# Patient Record
Sex: MALE | Race: WHITE | NOT HISPANIC OR LATINO | Employment: OTHER | URBAN - METROPOLITAN AREA
[De-identification: names, ages, dates, MRNs, and addresses within clinical notes are randomized per-mention and may not be internally consistent; named-entity substitution may affect disease eponyms.]

---

## 2019-10-31 ENCOUNTER — OFFICE VISIT (OUTPATIENT)
Dept: FAMILY MEDICINE | Facility: CLINIC | Age: 64
End: 2019-10-31
Payer: MEDICARE

## 2019-10-31 ENCOUNTER — TELEPHONE (OUTPATIENT)
Dept: ORTHOPEDICS | Facility: CLINIC | Age: 64
End: 2019-10-31

## 2019-10-31 ENCOUNTER — HOSPITAL ENCOUNTER (OUTPATIENT)
Dept: RADIOLOGY | Facility: HOSPITAL | Age: 64
Discharge: HOME | End: 2019-10-31
Attending: INTERNAL MEDICINE
Payer: MEDICARE

## 2019-10-31 VITALS
SYSTOLIC BLOOD PRESSURE: 136 MMHG | TEMPERATURE: 98 F | HEART RATE: 77 BPM | DIASTOLIC BLOOD PRESSURE: 82 MMHG | OXYGEN SATURATION: 98 %

## 2019-10-31 DIAGNOSIS — I10 HYPERTENSION, UNSPECIFIED TYPE: ICD-10-CM

## 2019-10-31 DIAGNOSIS — M25.571 RIGHT ANKLE PAIN, UNSPECIFIED CHRONICITY: ICD-10-CM

## 2019-10-31 DIAGNOSIS — M25.561 RIGHT KNEE PAIN, UNSPECIFIED CHRONICITY: Primary | ICD-10-CM

## 2019-10-31 DIAGNOSIS — Z86.73 HISTORY OF CARDIOEMBOLIC CEREBROVASCULAR ACCIDENT (CVA): ICD-10-CM

## 2019-10-31 DIAGNOSIS — E11.9 TYPE 2 DIABETES MELLITUS WITHOUT COMPLICATION, WITHOUT LONG-TERM CURRENT USE OF INSULIN: ICD-10-CM

## 2019-10-31 DIAGNOSIS — Z11.59 ENCOUNTER FOR HEPATITIS C SCREENING TEST FOR LOW RISK PATIENT: ICD-10-CM

## 2019-10-31 DIAGNOSIS — M25.561 RIGHT KNEE PAIN, UNSPECIFIED CHRONICITY: ICD-10-CM

## 2019-10-31 DIAGNOSIS — B35.1 TOENAIL FUNGUS: ICD-10-CM

## 2019-10-31 PROCEDURE — 99999 PR PBB SHADOW E&M-NEW PATIENT-LVL IV: CPT | Mod: PBBFAC,,, | Performed by: INTERNAL MEDICINE

## 2019-10-31 PROCEDURE — 73560 X-RAY EXAM OF KNEE 1 OR 2: CPT | Mod: 26,RT,, | Performed by: RADIOLOGY

## 2019-10-31 PROCEDURE — 99203 PR OFFICE/OUTPT VISIT, NEW, LEVL III, 30-44 MIN: ICD-10-PCS | Mod: S$PBB,,, | Performed by: INTERNAL MEDICINE

## 2019-10-31 PROCEDURE — 99204 OFFICE O/P NEW MOD 45 MIN: CPT | Mod: PBBFAC,PO,25 | Performed by: INTERNAL MEDICINE

## 2019-10-31 PROCEDURE — 73610 X-RAY EXAM OF ANKLE: CPT | Mod: 26,RT,, | Performed by: RADIOLOGY

## 2019-10-31 PROCEDURE — 99203 OFFICE O/P NEW LOW 30 MIN: CPT | Mod: S$PBB,,, | Performed by: INTERNAL MEDICINE

## 2019-10-31 PROCEDURE — 73610 XR ANKLE COMPLETE 3 VIEW RIGHT: ICD-10-PCS | Mod: 26,RT,, | Performed by: RADIOLOGY

## 2019-10-31 PROCEDURE — 90686 IIV4 VACC NO PRSV 0.5 ML IM: CPT | Mod: PBBFAC,PO

## 2019-10-31 PROCEDURE — 99999 PR PBB SHADOW E&M-NEW PATIENT-LVL IV: ICD-10-PCS | Mod: PBBFAC,,, | Performed by: INTERNAL MEDICINE

## 2019-10-31 PROCEDURE — 73610 X-RAY EXAM OF ANKLE: CPT | Mod: TC,FY,PO,RT

## 2019-10-31 PROCEDURE — 73560 XR KNEE 1 OR 2 VIEW RIGHT: ICD-10-PCS | Mod: 26,RT,, | Performed by: RADIOLOGY

## 2019-10-31 PROCEDURE — 73560 X-RAY EXAM OF KNEE 1 OR 2: CPT | Mod: TC,FY,PO,RT

## 2019-10-31 RX ORDER — PHENYTOIN SODIUM 100 MG/1
400 CAPSULE, EXTENDED RELEASE ORAL ONCE
COMMUNITY
End: 2019-12-12 | Stop reason: SDUPTHER

## 2019-10-31 RX ORDER — GLIMEPIRIDE 4 MG/1
4 TABLET ORAL
COMMUNITY
End: 2019-12-17 | Stop reason: SDUPTHER

## 2019-10-31 RX ORDER — UBIDECARENONE 30 MG
30 CAPSULE ORAL 3 TIMES DAILY
COMMUNITY
End: 2022-04-26 | Stop reason: CLARIF

## 2019-10-31 RX ORDER — TRAZODONE HYDROCHLORIDE 50 MG/1
50 TABLET ORAL NIGHTLY
COMMUNITY
End: 2019-12-17 | Stop reason: SDUPTHER

## 2019-10-31 RX ORDER — ASPIRIN 81 MG/1
81 TABLET ORAL DAILY
Status: ON HOLD | COMMUNITY
End: 2022-05-19 | Stop reason: HOSPADM

## 2019-10-31 RX ORDER — METOPROLOL TARTRATE 50 MG/1
100 TABLET ORAL 2 TIMES DAILY
COMMUNITY
End: 2019-12-16 | Stop reason: SDUPTHER

## 2019-10-31 RX ORDER — METFORMIN HYDROCHLORIDE 500 MG/1
500 TABLET ORAL
COMMUNITY
End: 2019-12-16 | Stop reason: SDUPTHER

## 2019-10-31 RX ORDER — SERTRALINE HYDROCHLORIDE 25 MG/1
25 TABLET, FILM COATED ORAL DAILY
COMMUNITY
End: 2019-12-16 | Stop reason: SDUPTHER

## 2019-10-31 RX ORDER — CHOLECALCIFEROL (VITAMIN D3) 25 MCG
1000 TABLET ORAL DAILY
Status: ON HOLD | COMMUNITY
End: 2022-05-19 | Stop reason: HOSPADM

## 2019-10-31 NOTE — PROGRESS NOTES
Subjective:       Patient ID: Samuel Cast is a 64 y.o. male.    Chief Complaint: Establish Care and Fall (10/20/19,  visit 10/21/19)    Pt here to get established.     DM: on metformin (500 TID) and amaryl.  Unsure what last a1c was.  Fasting sugars in the 90s.  HTN- stable on metoprolol   Hx ischemic stroke : on phenyttoin for seizure prophylaxis. It has left him with right hemiparesis and mild aphasia.  He is on asa and crestor (unsure dosage)    He had a fall 1.5 weeks ago and twisted his knee. He went to urgent care but no xrays were done.  He says his knee is swollen and ankle is swollen.     Review of Systems   Constitutional: Negative for fatigue, fever and unexpected weight change.   HENT: Negative for congestion, postnasal drip, sinus pain and sore throat.    Eyes: Negative for visual disturbance.   Respiratory: Negative for cough, chest tightness, shortness of breath and wheezing.    Cardiovascular: Negative for chest pain, palpitations and leg swelling.   Gastrointestinal: Negative for abdominal pain, blood in stool, constipation, diarrhea, nausea and vomiting.   Endocrine: Negative for cold intolerance and heat intolerance.   Genitourinary: Negative for difficulty urinating, dysuria and frequency.   Musculoskeletal: Negative for back pain, joint swelling and myalgias.   Skin: Negative for rash.   Allergic/Immunologic: Negative for environmental allergies.   Neurological: Negative for dizziness, seizures, numbness and headaches.   Hematological: Does not bruise/bleed easily.   Psychiatric/Behavioral: Negative for agitation and sleep disturbance.       Objective:      Physical Exam   Constitutional: He is oriented to person, place, and time. He appears well-developed and well-nourished.   HENT:   Head: Normocephalic and atraumatic.   Mouth/Throat: Oropharynx is clear and moist.   Eyes: Pupils are equal, round, and reactive to light. Conjunctivae and EOM are normal.   Neck: Normal range of motion. Neck  supple. No thyromegaly present.   Cardiovascular: Normal rate, regular rhythm, normal heart sounds and intact distal pulses. Exam reveals no gallop and no friction rub.   No murmur heard.  Pulmonary/Chest: Effort normal and breath sounds normal. No respiratory distress. He has no wheezes. He has no rales.   Abdominal: Soft. Bowel sounds are normal. He exhibits no distension. There is no tenderness.   Musculoskeletal: Normal range of motion. He exhibits no edema.   Lymphadenopathy:     He has no cervical adenopathy.   Neurological: He is alert and oriented to person, place, and time. No cranial nerve deficit.   Right hemiplegia   Skin: Skin is warm and dry.   Psychiatric: He has a normal mood and affect. His behavior is normal.       Assessment:       1. Right knee pain, unspecified chronicity    2. History of cardioembolic cerebrovascular accident (CVA)    3. Type 2 diabetes mellitus without complication, without long-term current use of insulin    4. Hypertension, unspecified type    5. Right ankle pain, unspecified chronicity    6. Encounter for hepatitis C screening test for low risk patient    7. Toenail fungus        Plan:       Knee pain- xray today, referred to ortho. Aleve BID  HTN- stable on med  Diabetes- decresae metformin to 500 bid, contineu amaryl. Check a1c.   Hx cva- - on asa and statin, bp and dm control    Flu stho today

## 2019-11-02 ENCOUNTER — LAB VISIT (OUTPATIENT)
Dept: LAB | Facility: HOSPITAL | Age: 64
End: 2019-11-02
Attending: INTERNAL MEDICINE
Payer: MEDICARE

## 2019-11-02 DIAGNOSIS — Z11.59 ENCOUNTER FOR HEPATITIS C SCREENING TEST FOR LOW RISK PATIENT: ICD-10-CM

## 2019-11-02 DIAGNOSIS — E11.9 TYPE 2 DIABETES MELLITUS WITHOUT COMPLICATION, WITHOUT LONG-TERM CURRENT USE OF INSULIN: ICD-10-CM

## 2019-11-02 DIAGNOSIS — Z86.73 HISTORY OF CARDIOEMBOLIC CEREBROVASCULAR ACCIDENT (CVA): ICD-10-CM

## 2019-11-02 LAB
ALBUMIN SERPL BCP-MCNC: 3.1 G/DL (ref 3.5–5.2)
ALP SERPL-CCNC: 239 U/L (ref 55–135)
ALT SERPL W/O P-5'-P-CCNC: 53 U/L (ref 10–44)
ANION GAP SERPL CALC-SCNC: 11 MMOL/L (ref 8–16)
AST SERPL-CCNC: 38 U/L (ref 10–40)
BASOPHILS # BLD AUTO: 0.03 K/UL (ref 0–0.2)
BASOPHILS NFR BLD: 0.6 % (ref 0–1.9)
BILIRUB SERPL-MCNC: 0.8 MG/DL (ref 0.1–1)
BUN SERPL-MCNC: 16 MG/DL (ref 8–23)
CALCIUM SERPL-MCNC: 8.9 MG/DL (ref 8.7–10.5)
CHLORIDE SERPL-SCNC: 107 MMOL/L (ref 95–110)
CHOLEST SERPL-MCNC: 177 MG/DL (ref 120–199)
CHOLEST/HDLC SERPL: 4.1 {RATIO} (ref 2–5)
CO2 SERPL-SCNC: 23 MMOL/L (ref 23–29)
CREAT SERPL-MCNC: 0.7 MG/DL (ref 0.5–1.4)
DIFFERENTIAL METHOD: ABNORMAL
EOSINOPHIL # BLD AUTO: 0.2 K/UL (ref 0–0.5)
EOSINOPHIL NFR BLD: 3.4 % (ref 0–8)
ERYTHROCYTE [DISTWIDTH] IN BLOOD BY AUTOMATED COUNT: 15.2 % (ref 11.5–14.5)
EST. GFR  (AFRICAN AMERICAN): >60 ML/MIN/1.73 M^2
EST. GFR  (NON AFRICAN AMERICAN): >60 ML/MIN/1.73 M^2
ESTIMATED AVG GLUCOSE: 123 MG/DL (ref 68–131)
GLUCOSE SERPL-MCNC: 120 MG/DL (ref 70–110)
HBA1C MFR BLD HPLC: 5.9 % (ref 4–5.6)
HCT VFR BLD AUTO: 38 % (ref 40–54)
HDLC SERPL-MCNC: 43 MG/DL (ref 40–75)
HDLC SERPL: 24.3 % (ref 20–50)
HGB BLD-MCNC: 11.8 G/DL (ref 14–18)
IMM GRANULOCYTES # BLD AUTO: 0.03 K/UL (ref 0–0.04)
IMM GRANULOCYTES NFR BLD AUTO: 0.6 % (ref 0–0.5)
LDLC SERPL CALC-MCNC: 103.2 MG/DL (ref 63–159)
LYMPHOCYTES # BLD AUTO: 0.9 K/UL (ref 1–4.8)
LYMPHOCYTES NFR BLD: 17.4 % (ref 18–48)
MCH RBC QN AUTO: 32.3 PG (ref 27–31)
MCHC RBC AUTO-ENTMCNC: 31.1 G/DL (ref 32–36)
MCV RBC AUTO: 104 FL (ref 82–98)
MONOCYTES # BLD AUTO: 0.5 K/UL (ref 0.3–1)
MONOCYTES NFR BLD: 9.6 % (ref 4–15)
NEUTROPHILS # BLD AUTO: 3.7 K/UL (ref 1.8–7.7)
NEUTROPHILS NFR BLD: 68.4 % (ref 38–73)
NONHDLC SERPL-MCNC: 134 MG/DL
NRBC BLD-RTO: 0 /100 WBC
PLATELET # BLD AUTO: 260 K/UL (ref 150–350)
PMV BLD AUTO: 9.9 FL (ref 9.2–12.9)
POTASSIUM SERPL-SCNC: 4.3 MMOL/L (ref 3.5–5.1)
PROT SERPL-MCNC: 6.8 G/DL (ref 6–8.4)
RBC # BLD AUTO: 3.65 M/UL (ref 4.6–6.2)
SODIUM SERPL-SCNC: 141 MMOL/L (ref 136–145)
TRIGL SERPL-MCNC: 154 MG/DL (ref 30–150)
TSH SERPL DL<=0.005 MIU/L-ACNC: 2.7 UIU/ML (ref 0.4–4)
WBC # BLD AUTO: 5.34 K/UL (ref 3.9–12.7)

## 2019-11-02 PROCEDURE — 80061 LIPID PANEL: CPT

## 2019-11-02 PROCEDURE — 36415 COLL VENOUS BLD VENIPUNCTURE: CPT | Mod: PO

## 2019-11-02 PROCEDURE — 80053 COMPREHEN METABOLIC PANEL: CPT

## 2019-11-02 PROCEDURE — 86803 HEPATITIS C AB TEST: CPT

## 2019-11-02 PROCEDURE — 85025 COMPLETE CBC W/AUTO DIFF WBC: CPT

## 2019-11-02 PROCEDURE — 84443 ASSAY THYROID STIM HORMONE: CPT

## 2019-11-02 PROCEDURE — 83036 HEMOGLOBIN GLYCOSYLATED A1C: CPT

## 2019-11-03 ENCOUNTER — PATIENT MESSAGE (OUTPATIENT)
Dept: FAMILY MEDICINE | Facility: CLINIC | Age: 64
End: 2019-11-03

## 2019-11-04 LAB — HCV AB SERPL QL IA: NEGATIVE

## 2019-11-04 RX ORDER — ROSUVASTATIN CALCIUM 20 MG/1
20 TABLET, COATED ORAL DAILY
COMMUNITY
End: 2019-11-04 | Stop reason: SDUPTHER

## 2019-11-04 RX ORDER — ROSUVASTATIN CALCIUM 20 MG/1
20 TABLET, COATED ORAL DAILY
Qty: 90 TABLET | Refills: 3 | Status: SHIPPED | OUTPATIENT
Start: 2019-11-04 | End: 2019-12-16 | Stop reason: SDUPTHER

## 2019-11-08 ENCOUNTER — OFFICE VISIT (OUTPATIENT)
Dept: ORTHOPEDICS | Facility: CLINIC | Age: 64
End: 2019-11-08
Payer: MEDICARE

## 2019-11-08 VITALS — WEIGHT: 283 LBS | BODY MASS INDEX: 34.46 KG/M2 | HEIGHT: 76 IN

## 2019-11-08 DIAGNOSIS — M25.561 ACUTE PAIN OF RIGHT KNEE: ICD-10-CM
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
## 2019-11-08 DIAGNOSIS — M25.571 ACUTE RIGHT ANKLE PAIN: ICD-10-CM

## 2019-11-08 PROCEDURE — 99203 OFFICE O/P NEW LOW 30 MIN: CPT | Mod: S$PBB,,, | Performed by: ORTHOPAEDIC SURGERY

## 2019-11-08 PROCEDURE — 99999 PR PBB SHADOW E&M-EST. PATIENT-LVL III: ICD-10-PCS | Mod: PBBFAC,,, | Performed by: ORTHOPAEDIC SURGERY

## 2019-11-08 PROCEDURE — 99203 PR OFFICE/OUTPT VISIT, NEW, LEVL III, 30-44 MIN: ICD-10-PCS | Mod: S$PBB,,, | Performed by: ORTHOPAEDIC SURGERY

## 2019-11-08 PROCEDURE — 99213 OFFICE O/P EST LOW 20 MIN: CPT | Mod: PBBFAC,PN | Performed by: ORTHOPAEDIC SURGERY

## 2019-11-08 PROCEDURE — 99999 PR PBB SHADOW E&M-EST. PATIENT-LVL III: CPT | Mod: PBBFAC,,, | Performed by: ORTHOPAEDIC SURGERY

## 2019-11-08 RX ORDER — MELOXICAM 15 MG/1
15 TABLET ORAL DAILY
Qty: 30 TABLET | Refills: 1
Start: 2019-11-08 | End: 2020-01-14

## 2019-11-08 NOTE — PROGRESS NOTES
A 64 years old, right knee pain for about three weeks, had a fall, twisted his   knee, witnessed by his daughter, did not hear a pop.  He has deficit in the   right lower extremity from a stroke 12 years ago.  Says he had been feeling   better over the past few years.    PHYSICAL EXAMINATION:  EXTREMITIES:  Shows he has what appears to be some valgus laxity to the knee,   some tenderness along the medial joint line.  Extensor mechanism, I cannot   really assess, he is unable to extend the knee.    ASSESSMENT:  Knee sprain in a 64-year-old with history of stroke affecting the   right lower extremity.    PLAN:  We will recommend an MRI of the knee.  He wants to hold off on that.  We   went through conservative care, observation.  Requested anti-inflammatories.  We   are going to give him Mobic.  We will check him back in a few weeks' time to   see how things are coming along.      PBB/HN  dd: 11/08/2019 13:38:36 (CST)  td: 11/09/2019 06:03:24 (CST)  Doc ID   #[id number]  Job ID #[id number]    CC:     Further History  Aching pain  Worse with activity  Relieved with rest  No other associated symptoms  No other radiation    Further Exam  Alert and oriented  Pleasant  Contralateral limb has appropriate range of motion for age and condition  Contralateral limb has appropriate strength for age and condition  Contralateral limb has appropriate stability  for age and condition  No adenopathy  Pulses are appropriate for current condition  Skin is intact        Chief Complaint    Chief Complaint   Patient presents with    Right Knee - Pain    Right Ankle - Pain       HPI  Samuel Cast is a 64 y.o.  male who presents with       Past Medical History  History reviewed. No pertinent past medical history.    Past Surgical History  History reviewed. No pertinent surgical history.    Medications  Current Outpatient Medications   Medication Sig    aspirin (ECOTRIN) 81 MG EC tablet Take 81 mg by mouth once daily.    co-enzyme Q-10 30 mg  capsule Take 30 mg by mouth 3 (three) times daily.    glimepiride (AMARYL) 4 MG tablet Take 4 mg by mouth before breakfast.    meloxicam (MOBIC) 15 MG tablet Take 1 tablet (15 mg total) by mouth once daily.    metFORMIN (GLUCOPHAGE) 500 MG tablet Take 500 mg by mouth 3 (three) times daily with meals.    metoprolol tartrate (LOPRESSOR) 50 MG tablet Take 100 mg by mouth 2 (two) times daily.    multivitamin Tab Take 1 tablet by mouth once daily.    phenytoin (DILANTIN) 100 MG ER capsule Take 400 mg by mouth once.    rosuvastatin (CRESTOR) 20 MG tablet Take 1 tablet (20 mg total) by mouth once daily.    sertraline (ZOLOFT) 25 MG tablet Take 25 mg by mouth once daily.    traZODone (DESYREL) 50 MG tablet Take 50 mg by mouth every evening.    vitamin D (VITAMIN D3) 1000 units Tab Take 1,000 Units by mouth once daily.     No current facility-administered medications for this visit.        Allergies  Review of patient's allergies indicates:  No Known Allergies    Family History  History reviewed. No pertinent family history.    Social History  Social History     Socioeconomic History    Marital status:      Spouse name: Not on file    Number of children: Not on file    Years of education: Not on file    Highest education level: Not on file   Occupational History    Not on file   Social Needs    Financial resource strain: Not on file    Food insecurity:     Worry: Not on file     Inability: Not on file    Transportation needs:     Medical: Not on file     Non-medical: Not on file   Tobacco Use    Smoking status: Former Smoker   Substance and Sexual Activity    Alcohol use: Not Currently    Drug use: Not Currently    Sexual activity: Not on file   Lifestyle    Physical activity:     Days per week: Not on file     Minutes per session: Not on file    Stress: Not on file   Relationships    Social connections:     Talks on phone: Not on file     Gets together: Not on file     Attends Orthodoxy  service: Not on file     Active member of club or organization: Not on file     Attends meetings of clubs or organizations: Not on file     Relationship status: Not on file   Other Topics Concern    Not on file   Social History Narrative    Not on file               Review of Systems     Constitutional: Negative    HENT: Negative  Eyes: Negative  Respiratory: Negative  Cardiovascular: Negative  Musculoskeletal: HPI  Skin: Negative  Neurological: Negative  Hematological: Negative  Endocrine: Negative                 Physical Exam    There were no vitals filed for this visit.  Body mass index is 34.45 kg/m².  Physical Examination:     General appearance -  well appearing, and in no distress  Mental status - awake  Neck - supple  Chest -  symmetric air entry  Heart - normal rate   Abdomen - soft      Assessment     1. Fall, initial encounter    2. Acute right ankle pain    3. Acute pain of right knee          Plan

## 2019-11-12 ENCOUNTER — PATIENT MESSAGE (OUTPATIENT)
Dept: FAMILY MEDICINE | Facility: CLINIC | Age: 64
End: 2019-11-12

## 2019-11-12 NOTE — TELEPHONE ENCOUNTER
See mychart message. Wife would like to know if pt ok to take Nyquil at night to help with sleep for c/o cough & jerson. Please advise.

## 2019-12-06 ENCOUNTER — OFFICE VISIT (OUTPATIENT)
Dept: ORTHOPEDICS | Facility: CLINIC | Age: 64
End: 2019-12-06
Payer: MEDICARE

## 2019-12-06 VITALS — HEIGHT: 76 IN | BODY MASS INDEX: 34.46 KG/M2 | WEIGHT: 283 LBS

## 2019-12-06 DIAGNOSIS — M25.571 ACUTE RIGHT ANKLE PAIN: Primary | ICD-10-CM

## 2019-12-06 DIAGNOSIS — S83.91XD SPRAIN OF RIGHT KNEE, UNSPECIFIED LIGAMENT, SUBSEQUENT ENCOUNTER: ICD-10-CM
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
## 2019-12-06 DIAGNOSIS — M25.561 ACUTE PAIN OF RIGHT KNEE: ICD-10-CM

## 2019-12-06 PROCEDURE — 97760 ORTHOTIC MGMT&TRAING 1ST ENC: CPT | Mod: GP,S$GLB,, | Performed by: ORTHOPAEDIC SURGERY

## 2019-12-06 PROCEDURE — 97760 PR ORTHOTIC MGMT&TRAINJ INITIAL ENC EA 15 MINS: ICD-10-PCS | Mod: GP,S$GLB,, | Performed by: ORTHOPAEDIC SURGERY

## 2019-12-06 PROCEDURE — 99213 OFFICE O/P EST LOW 20 MIN: CPT | Mod: S$GLB,,, | Performed by: ORTHOPAEDIC SURGERY

## 2019-12-06 PROCEDURE — 99213 PR OFFICE/OUTPT VISIT, EST, LEVL III, 20-29 MIN: ICD-10-PCS | Mod: S$GLB,,, | Performed by: ORTHOPAEDIC SURGERY

## 2019-12-06 PROCEDURE — 99999 PR PBB SHADOW E&M-EST. PATIENT-LVL III: ICD-10-PCS | Mod: PBBFAC,,, | Performed by: ORTHOPAEDIC SURGERY

## 2019-12-06 PROCEDURE — 99999 PR PBB SHADOW E&M-EST. PATIENT-LVL III: CPT | Mod: PBBFAC,,, | Performed by: ORTHOPAEDIC SURGERY

## 2019-12-06 PROCEDURE — 99213 OFFICE O/P EST LOW 20 MIN: CPT | Mod: PBBFAC,PN | Performed by: ORTHOPAEDIC SURGERY

## 2019-12-06 NOTE — PROGRESS NOTES
HISTORY OF PRESENT ILLNESS:  A 64 year old, followup of twisting his knee.    Again, the patient has a history of a stroke with the right lower extremity   deficit, occurred 12 years ago, not really able to move that lower extremity,   comes today to have his right knee reexamined.    He does have some laxity detected in his knee, some tenderness medially. **   wrap around knee brace.  Continue with basic conservative care.  We will see him   back as needed.        OTF/RAUL  dd: 12/06/2019 11:00:40 (CST)  td: 12/07/2019 01:50:13 (CST)  Doc ID   #[id number]  Job ID #[id number]    CC:     We performed a custom orthotic/brace fitting, adjusting and training with the patient. The patient demonstrated understanding and proper care. This was performed for 15 minutes.

## 2019-12-12 ENCOUNTER — PATIENT MESSAGE (OUTPATIENT)
Dept: FAMILY MEDICINE | Facility: CLINIC | Age: 64
End: 2019-12-12

## 2019-12-12 RX ORDER — PHENYTOIN SODIUM 100 MG/1
400 CAPSULE, EXTENDED RELEASE ORAL ONCE
Qty: 4 CAPSULE | Refills: 0 | Status: SHIPPED | OUTPATIENT
Start: 2019-12-12 | End: 2019-12-16 | Stop reason: SDUPTHER

## 2019-12-13 DIAGNOSIS — E11.9 TYPE 2 DIABETES MELLITUS WITHOUT COMPLICATION: ICD-10-CM

## 2019-12-16 ENCOUNTER — PATIENT MESSAGE (OUTPATIENT)
Dept: FAMILY MEDICINE | Facility: CLINIC | Age: 64
End: 2019-12-16

## 2019-12-16 NOTE — PROGRESS NOTES
Refill Routing Note     Medication(s) are appropriate for refill:    Non Participating Provider in Refill Center     Appointments  past 15m or future 3m with PCP    Date Provider   Last Visit   10/31/2019 Nallely Jurado MD   Next Visit   2/4/2020 Nallely Jurado MD       Automatic Epic Protocol Generated Data:    Requested Prescriptions   Pending Prescriptions Disp Refills    phenytoin (DILANTIN) 100 MG ER capsule 4 capsule 0     Sig: Take 4 capsules (400 mg total) by mouth once. for 1 dose       Anticonvulsants Protocol Passed - 12/16/2019  4:40 PM        Passed - Visit with Authorizing provider in past 9 months or upcoming 90 days       rosuvastatin (CRESTOR) 20 MG tablet 90 tablet 3     Sig: Take 1 tablet (20 mg total) by mouth once daily.       Cardiovascular:  Antilipid - Statins Passed - 12/16/2019  4:40 PM        Passed - Patient is at least 18 years old        Passed - Office visit in past 12 months or future 90 days     Recent Outpatient Visits            1 week ago Acute right ankle pain    [facility]    1 month ago Fall, initial encounter    [facility]    1 month ago Right knee pain, unspecified chronicity    [facility] Nallely Jurado MD          Future Appointments              In 1 month Nallely Jurado MD [facility]                Passed - Lipid Panel completed in last 360 days     Lab Results   Component Value Date    CHOL 177 11/02/2019    HDL 43 11/02/2019    LDLCALC 103.2 11/02/2019    TRIG 154 (H) 11/02/2019             Passed - ALT is 94 or below and within 360 days     ALT   Date Value Ref Range Status   11/02/2019 53 (H) 10 - 44 U/L Final              Passed - AST is 54 or below and within 360 days     AST   Date Value Ref Range Status   11/02/2019 38 10 - 40 U/L Final             sertraline (ZOLOFT) 25 MG tablet 30 tablet 0     Sig: Take 1 tablet (25 mg total)  by mouth once daily.       Psychiatry:  Antidepressants - SSRI Passed - 12/16/2019  4:40 PM        Passed - Patient is at least 18 years old        Passed - Last BP in normal range within 360 days     BP Readings from Last 3 Encounters:   10/31/19 136/82              Passed - Office visit in past 6 months or future 90 days     Recent Outpatient Visits            1 week ago Acute right ankle pain    [place] [facility]    1 month ago Fall, initial encounter    [facility]    1 month ago Right knee pain, unspecified chronicity    [facility] Nallely Jurado MD          Future Appointments              In 1 month Nallely Jurado MD [facility]               metFORMIN (GLUCOPHAGE) 500 MG tablet 30 tablet 0     Sig: Take 1 tablet (500 mg total) by mouth 3 (three) times daily with meals.       Endocrinology:  Diabetes - Biguanides Passed - 12/16/2019  4:40 PM        Passed - Patient is at least 18 years old        Passed - Office visit in past 12 months or future 90 days     Recent Outpatient Visits            1 week ago Acute right ankle pain    [place]    1 month ago Fall, initial encounter    [facility]    1 month ago Right knee pain, unspecified chronicity    [facility] Nallely Jurado MD          Future Appointments              In 1 month Nallely Jurado MD [facility]                Passed - Cr is 1.4 or below and within 360 days     Creatinine   Date Value Ref Range Status   11/02/2019 0.7 0.5 - 1.4 mg/dL Final              Passed - HBA1C is 7.9 or below and within 180 days     Hemoglobin A1C   Date Value Ref Range Status   11/02/2019 5.9 (H) 4.0 - 5.6 % Final     Comment:     ADA Screening Guidelines:  5.7-6.4%  Consistent with prediabetes  >or=6.5%  Consistent with diabetes  High levels of fetal  hemoglobin interfere with the HbA1C  assay. Heterozygous hemoglobin variants (HbS, HgC, etc)do  not significantly interfere with this assay.   However, presence of multiple variants may affect accuracy.                Passed - eGFR is 30 or above and within 360 days     eGFR if non    Date Value Ref Range Status   11/02/2019 >60.0 >60 mL/min/1.73 m^2 Final     Comment:     Calculation used to obtain the estimated glomerular filtration  rate (eGFR) is the CKD-EPI equation.                metoprolol tartrate (LOPRESSOR) 50 MG tablet 16 tablet 0     Sig: Take 2 tablets (100 mg total) by mouth 2 (two) times daily.       Cardiovascular:  Beta Blockers Passed - 12/16/2019  4:40 PM        Passed - Patient is at least 18 years old        Passed - Last BP in normal range within 360 days     BP Readings from Last 3 Encounters:   10/31/19 136/82              Passed - Last Heart Rate in normal range within 360 days     Pulse Readings from Last 3 Encounters:   10/31/19 77             Passed - Office visit in past 12 months or future 90 days     Recent Outpatient Visits            1 week ago Acute right ankle pain    [facility]    1 month ago Fall, initial encounter    [facility]    1 month ago Right knee pain, unspecified chronicity    [facility] Nallely Jurado MD          Future Appointments              In 1 month Nallely Jurado MD [facility] [place]

## 2019-12-17 RX ORDER — METFORMIN HYDROCHLORIDE 500 MG/1
500 TABLET ORAL
Qty: 30 TABLET | Refills: 0 | Status: SHIPPED | OUTPATIENT
Start: 2019-12-17 | End: 2019-12-17 | Stop reason: SDUPTHER

## 2019-12-17 RX ORDER — TRAZODONE HYDROCHLORIDE 50 MG/1
50 TABLET ORAL NIGHTLY
Status: CANCELLED | OUTPATIENT
Start: 2019-12-17

## 2019-12-17 RX ORDER — SERTRALINE HYDROCHLORIDE 25 MG/1
25 TABLET, FILM COATED ORAL DAILY
Qty: 30 TABLET | Refills: 0 | Status: SHIPPED | OUTPATIENT
Start: 2019-12-17 | End: 2019-12-23 | Stop reason: SDUPTHER

## 2019-12-17 RX ORDER — METOPROLOL TARTRATE 50 MG/1
100 TABLET ORAL 2 TIMES DAILY
Qty: 16 TABLET | Refills: 0 | Status: SHIPPED | OUTPATIENT
Start: 2019-12-17 | End: 2019-12-17 | Stop reason: SDUPTHER

## 2019-12-17 RX ORDER — SERTRALINE HYDROCHLORIDE 25 MG/1
25 TABLET, FILM COATED ORAL DAILY
Qty: 30 TABLET | Refills: 0 | Status: SHIPPED | OUTPATIENT
Start: 2019-12-17 | End: 2019-12-17 | Stop reason: SDUPTHER

## 2019-12-17 RX ORDER — PHENYTOIN SODIUM 100 MG/1
400 CAPSULE, EXTENDED RELEASE ORAL ONCE
Qty: 4 CAPSULE | Refills: 0 | Status: SHIPPED | OUTPATIENT
Start: 2019-12-17 | End: 2019-12-17 | Stop reason: SDUPTHER

## 2019-12-17 RX ORDER — METOPROLOL TARTRATE 50 MG/1
100 TABLET ORAL 2 TIMES DAILY
Qty: 16 TABLET | Refills: 0 | Status: SHIPPED | OUTPATIENT
Start: 2019-12-17 | End: 2019-12-23 | Stop reason: SDUPTHER

## 2019-12-17 RX ORDER — PHENYTOIN SODIUM 100 MG/1
400 CAPSULE, EXTENDED RELEASE ORAL ONCE
Qty: 4 CAPSULE | Refills: 0 | Status: SHIPPED | OUTPATIENT
Start: 2019-12-17 | End: 2019-12-19 | Stop reason: SDUPTHER

## 2019-12-17 RX ORDER — ROSUVASTATIN CALCIUM 20 MG/1
20 TABLET, COATED ORAL DAILY
Qty: 90 TABLET | Refills: 3 | Status: SHIPPED | OUTPATIENT
Start: 2019-12-17 | End: 2020-10-11

## 2019-12-17 RX ORDER — METFORMIN HYDROCHLORIDE 500 MG/1
500 TABLET ORAL
Qty: 30 TABLET | Refills: 0 | Status: SHIPPED | OUTPATIENT
Start: 2019-12-17 | End: 2019-12-23 | Stop reason: SDUPTHER

## 2019-12-17 RX ORDER — PHENYTOIN SODIUM 100 MG/1
CAPSULE, EXTENDED RELEASE ORAL
Refills: 0 | OUTPATIENT
Start: 2019-12-17

## 2019-12-17 NOTE — TELEPHONE ENCOUNTER
Please see [employer] message. Refills were sent but for incorrect quantities for mail order prescriptions which are usually 90 day supply. Pended medications.

## 2019-12-17 NOTE — PROGRESS NOTES
Refill Routing Note     Medication(s) are appropriate for refill:    Non Participating Provider in Refill Center     Appointments  past 15m or future 3m with PCP    Date Provider   Last Visit   10/31/2019 Nallely Jurado MD   Next Visit   12/17/2019 Nallely Jurado MD       Automatic Epic Protocol Generated Data:    Requested Prescriptions   Pending Prescriptions Disp Refills    glimepiride (AMARYL) 4 MG tablet       Sig: Take 1 tablet (4 mg total) by mouth before breakfast.       Endocrinology:  Diabetes - Sulfonylureas Passed - 12/17/2019  4:22 PM        Passed - Patient is at least 18 years old        Passed - Office visit in past 12 months or future 90 days     Recent Outpatient Visits            1 week ago Acute right ankle pain    [facility]    1 month ago Fall, initial encounter    [facility]    1 month ago Right knee pain, unspecified chronicity    [facility] Nallely Jurado MD          Future Appointments              In 1 month Nallely Jurado MD [facility]                Passed - HBA1C is 7.9 or below and within 180 days     Hemoglobin A1C   Date Value Ref Range Status   11/02/2019 5.9 (H) 4.0 - 5.6 % Final     Comment:     ADA Screening Guidelines:  5.7-6.4%  Consistent with prediabetes  >or=6.5%  Consistent with diabetes  High levels of fetal hemoglobin interfere with the HbA1C  assay. Heterozygous hemoglobin variants (HbS, HgC, etc)do  not significantly interfere with this assay.   However, presence of multiple variants may affect accuracy.                Passed - Cr is 1.4 or below and within 360 days     Creatinine   Date Value Ref Range Status   11/02/2019 0.7 0.5 - 1.4 mg/dL Final              Passed - eGFR is 30 or above and within 360 days     eGFR if non    Date Value Ref Range Status   11/02/2019 >60.0 >60 mL/min/1.73 m^2 Final     Comment:      Calculation used to obtain the estimated glomerular filtration  rate (eGFR) is the CKD-EPI equation.                traZODone (DESYREL) 50 MG tablet       Sig: Take 1 tablet (50 mg total) by mouth every evening.       Psychiatry: Antidepressants - Serotonin Modulator Passed - 12/17/2019  4:22 PM        Passed - Patient is at least 18 years old        Passed - Office visit in past 6 months or future 90 days     Recent Outpatient Visits            1 week ago Acute right ankle pain    [place] [facility]    1 month ago Fall, initial encounter    [place] [facility]    1 month ago Right knee pain, unspecified chronicity    [facility] Nallely Jurado MD          Future Appointments              In 1 month [facility]

## 2019-12-18 RX ORDER — GLIMEPIRIDE 4 MG/1
4 TABLET ORAL
Qty: 30 TABLET | Refills: 11 | Status: SHIPPED | OUTPATIENT
Start: 2019-12-18 | End: 2019-12-23 | Stop reason: SDUPTHER

## 2019-12-18 RX ORDER — TRAZODONE HYDROCHLORIDE 50 MG/1
50 TABLET ORAL NIGHTLY
Qty: 30 TABLET | Refills: 11 | Status: SHIPPED | OUTPATIENT
Start: 2019-12-18 | End: 2020-02-04 | Stop reason: SDUPTHER

## 2019-12-18 NOTE — TELEPHONE ENCOUNTER
----- Message from Lillian Mauricio sent at 12/18/2019  4:26 PM CST -----  Contact: Pt  Type: Needs Medical Advice    Who Called:  PT  Best Call Back Number: [phone]  Additional Information: Requesting a call back regarding pt medication phenytoin (DILANTIN) 100 MG ER capsule pt stated that he takes 4 pill a day and he only had enough for one day and now pt is out   Please Advise ---Thank you

## 2019-12-19 RX ORDER — PHENYTOIN SODIUM 100 MG/1
400 CAPSULE, EXTENDED RELEASE ORAL DAILY
Qty: 120 CAPSULE | Refills: 6 | Status: SHIPPED | OUTPATIENT
Start: 2019-12-19 | End: 2019-12-23 | Stop reason: SDUPTHER

## 2019-12-20 ENCOUNTER — PATIENT MESSAGE (OUTPATIENT)
Dept: FAMILY MEDICINE | Facility: CLINIC | Age: 64
End: 2019-12-20

## 2019-12-23 DIAGNOSIS — E11.9 TYPE 2 DIABETES MELLITUS WITHOUT COMPLICATION, WITHOUT LONG-TERM CURRENT USE OF INSULIN: Primary | ICD-10-CM

## 2019-12-23 RX ORDER — SERTRALINE HYDROCHLORIDE 25 MG/1
25 TABLET, FILM COATED ORAL DAILY
Qty: 30 TABLET | Refills: 0 | Status: SHIPPED | OUTPATIENT
Start: 2019-12-23 | End: 2019-12-30 | Stop reason: SDUPTHER

## 2019-12-23 RX ORDER — GLIMEPIRIDE 4 MG/1
4 TABLET ORAL
Qty: 30 TABLET | Refills: 11 | Status: SHIPPED | OUTPATIENT
Start: 2019-12-23 | End: 2019-12-26 | Stop reason: SDUPTHER

## 2019-12-23 RX ORDER — METOPROLOL TARTRATE 50 MG/1
100 TABLET ORAL 2 TIMES DAILY
Qty: 16 TABLET | Refills: 0 | Status: SHIPPED | OUTPATIENT
Start: 2019-12-23 | End: 2019-12-24

## 2019-12-23 RX ORDER — PHENYTOIN SODIUM 100 MG/1
400 CAPSULE, EXTENDED RELEASE ORAL DAILY
Qty: 120 CAPSULE | Refills: 6 | Status: SHIPPED | OUTPATIENT
Start: 2019-12-23 | End: 2019-12-30 | Stop reason: SDUPTHER

## 2019-12-23 RX ORDER — METFORMIN HYDROCHLORIDE 500 MG/1
500 TABLET ORAL
Qty: 30 TABLET | Refills: 0 | Status: SHIPPED | OUTPATIENT
Start: 2019-12-23 | End: 2019-12-30 | Stop reason: SDUPTHER

## 2019-12-23 NOTE — TELEPHONE ENCOUNTER
Pt requesting glimepiride be sent to mail order(previous rx sent to local pharmacy). Order pended.

## 2019-12-24 RX ORDER — INSULIN PUMP SYRINGE, 3 ML
EACH MISCELLANEOUS
Qty: 1 EACH | Refills: 0 | Status: SHIPPED | OUTPATIENT
Start: 2019-12-24 | End: 2019-12-31 | Stop reason: SDUPTHER

## 2019-12-24 RX ORDER — METOPROLOL TARTRATE 50 MG/1
TABLET ORAL
Qty: 16 TABLET | Refills: 0 | Status: SHIPPED | OUTPATIENT
Start: 2019-12-24 | End: 2020-01-06 | Stop reason: SDUPTHER

## 2019-12-24 RX ORDER — LANCETS
EACH MISCELLANEOUS
Qty: 100 EACH | Refills: 0 | Status: SHIPPED | OUTPATIENT
Start: 2019-12-24 | End: 2019-12-31 | Stop reason: SDUPTHER

## 2019-12-24 RX ORDER — ISOPROPYL ALCOHOL 70 ML/100ML
1 SWAB TOPICAL DAILY
Qty: 90 EACH | Refills: 3 | Status: SHIPPED | OUTPATIENT
Start: 2019-12-24 | End: 2020-10-28

## 2019-12-26 NOTE — PROGRESS NOTES
Refill Authorization Note     is requesting a refill authorization.    Brief assessment and rationale for refill: ROUTE: npp          Medication Therapy Plan: Non-participating provider, route to you           Comments:                       Refill Routing Note     Medication(s) are not appropriate for processing by [facility]:    Non Participating Provider in Refill Center     Appointments  past 12m or future 3m with PCP    Date Provider   Last Visit   10/31/2019 Nallely Jurado MD   Next Visit   2/4/2020 Nallely Jurado MD           Automatic Epic Protocol Generated Data:    Requested Prescriptions   Pending Prescriptions Disp Refills    glimepiride (AMARYL) 4 MG tablet 90 tablet 2     Sig: Take 1 tablet (4 mg total) by mouth before breakfast.       Endocrinology:  Diabetes - Sulfonylureas Passed - 12/26/2019  2:42 PM        Passed - Patient is at least 18 years old        Passed - Office visit in past 12 months or future 90 days     Recent Outpatient Visits            2 weeks ago Acute right ankle pain    [facility]    1 month ago Fall, initial encounter    [facility]    1 month ago Right knee pain, unspecified chronicity    [facility] Nallely Jurado MD          Future Appointments              In 2 weeks [facility]ita Martínez D[facility]    In 1 month Nallely Jurado MD [facility]                Passed - HBA1C is 7.9 or below and within 180 days     Hemoglobin A1C   Date Value Ref Range Status   11/02/2019 5.9 (H) 4.0 - 5.6 % Final     Comment:     ADA Screening Guidelines:  5.7-6.4%  Consistent with prediabetes  >or=6.5%  Consistent with diabetes  High levels of fetal hemoglobin interfere with the HbA1C  assay. Heterozygous hemoglobin variants (HbS, HgC, etc)do  not significantly interfere with this assay.   However, presence of multiple variants may  affect accuracy.                Passed - Cr is 1.4 or below and within 360 days     Creatinine   Date Value Ref Range Status   11/02/2019 0.7 0.5 - 1.4 mg/dL Final              Passed - eGFR is 30 or above and within 360 days     eGFR if non    Date Value Ref Range Status   11/02/2019 >60.0 >60 mL/min/1.73 m^2 Final     Comment:     Calculation used to obtain the estimated glomerular filtration  rate (eGFR) is the CKD-EPI equation.        eGFR if    Date Value Ref Range Status   11/02/2019 >60.0 >60 mL/min/1.73 m^2 Final

## 2019-12-30 RX ORDER — GLIMEPIRIDE 4 MG/1
4 TABLET ORAL
Qty: 90 TABLET | Refills: 2 | Status: SHIPPED | OUTPATIENT
Start: 2019-12-30 | End: 2020-02-04 | Stop reason: SDUPTHER

## 2019-12-31 DIAGNOSIS — E11.9 TYPE 2 DIABETES MELLITUS WITHOUT COMPLICATION, WITHOUT LONG-TERM CURRENT USE OF INSULIN: ICD-10-CM

## 2019-12-31 RX ORDER — PHENYTOIN SODIUM 100 MG/1
400 CAPSULE, EXTENDED RELEASE ORAL DAILY
Qty: 120 CAPSULE | Refills: 6 | Status: SHIPPED | OUTPATIENT
Start: 2019-12-31 | End: 2020-03-24 | Stop reason: SDUPTHER

## 2019-12-31 RX ORDER — METFORMIN HYDROCHLORIDE 500 MG/1
500 TABLET ORAL
Qty: 30 TABLET | Refills: 0 | Status: SHIPPED | OUTPATIENT
Start: 2019-12-31 | End: 2020-10-08 | Stop reason: SDUPTHER

## 2019-12-31 RX ORDER — SERTRALINE HYDROCHLORIDE 25 MG/1
25 TABLET, FILM COATED ORAL DAILY
Qty: 30 TABLET | Refills: 0 | Status: SHIPPED | OUTPATIENT
Start: 2019-12-31 | End: 2020-01-30

## 2019-12-31 NOTE — PROGRESS NOTES
Refill Routing Note     Medication(s) are not appropriate for processing by [facility]:    Non Participating Provider in Refill Center     Appointments  past 12m or future 3m with PCP    Date Provider   Last Visit   10/31/2019 Nallely Jurado MD   Next Visit   2/4/2020 Nallely Jurado MD           Automatic Epic Protocol Generated Data:    Requested Prescriptions   Pending Prescriptions Disp Refills    phenytoin (DILANTIN) 100 MG ER capsule 120 capsule 6     Sig: Take 4 capsules (400 mg total) by mouth once daily.       Anticonvulsants Protocol Passed - 12/30/2019  3:08 PM        Passed - Visit with Authorizing provider in past 9 months or upcoming 90 days       sertraline (ZOLOFT) 25 MG tablet 30 tablet 0     Sig: Take 1 tablet (25 mg total) by mouth once daily.       Psychiatry:  Antidepressants - SSRI Passed - 12/30/2019  3:08 PM        Passed - Patient is at least 18 years old        Passed - Last BP in normal range within 360 days     BP Readings from Last 3 Encounters:   10/31/19 136/82              Passed - Office visit in past 6 months or future 90 days     Recent Outpatient Visits            3 weeks ago Acute right ankle pain    [place] [facility] Keating MD    1 month ago Fall, initial encounter    [place] [facility] Keating MD    2 months ago Right knee pain, unspecified chronicity    [facility] Nallely Jurado MD          Future Appointments              In 2 weeks [facility] Martínez, [facility]    In 1 month Nallely Jurado MD [facility]               metFORMIN (GLUCOPHAGE) 500 MG tablet 30 tablet 0     Sig: Take 1 tablet (500 mg total) by mouth 3 (three) times daily with meals.       Endocrinology:  Diabetes - Biguanides Passed - 12/30/2019  3:08 PM        Passed - Patient is at least 18 years old        Passed - Office visit in past 12 months or future 90 days      Recent Outpatient Visits            3 weeks ago Acute right ankle pain    [facility] Ac Keating MD    1 month ago Fall, initial encounter    [facility] Ac Keating MD    2 months ago Right knee pain, unspecified chronicity    [facility] Nallely Jurado MD          Future Appointments              In 2 weeks Nikita Martínez, RIYA [facility]    In 1 month Nallely [facility]ado [facility]                Passed - Cr is 1.4 or below and within 360 days     Creatinine   Date Value Ref Range Status   11/02/2019 0.7 0.5 - 1.4 mg/dL Final              Passed - HBA1C is 7.9 or below and within 180 days     Hemoglobin A1C   Date Value Ref Range Status   11/02/2019 5.9 (H) 4.0 - 5.6 % Final     Comment:     ADA Screening Guidelines:  5.7-6.4%  Consistent with prediabetes  >or=6.5%  Consistent with diabetes  High levels of fetal hemoglobin interfere with the HbA1C  assay. Heterozygous hemoglobin variants (HbS, HgC, etc)do  not significantly interfere with this assay.   However, presence of multiple variants may affect accuracy.                Passed - eGFR is 30 or above and within 360 days     eGFR if non    Date Value Ref Range Status   11/02/2019 >60.0 >60 mL/min/1.73 m^2 Final     Comment:     Calculation used to obtain the estimated glomerular filtration  rate (eGFR) is the CKD-EPI equation.        eGFR if    Date Value Ref Range Status   11/02/2019 >60.0 >60 mL/min/1.73 m^2 Final

## 2019-12-31 NOTE — PROGRESS NOTES
Refill Routing Note     Medication(s) are not appropriate for processing by [facility]:    Non Participating Provider in Refill Center     Appointments  past 12m or future 3m with PCP    Date Provider   Last Visit   10/31/2019 Nallely Jurado MD   Next Visit   2/4/2020 Nallely Jurado MD           Automatic Epic Protocol Generated Data:    Requested Prescriptions   Pending Prescriptions Disp Refills    blood-glucose meter kit 1 each 0     Sig: To check BG  daily, to use with insurance preferred meter       Endocrinology: Diabetes - Supplies Passed - 12/31/2019  4:09 PM        Passed - Patient is at least 18 years old        Passed - Office visit in past 12 months or future 90 days.     Recent Outpatient Visits            3 weeks ago Acute right ankle pain    [facility] Ac Keating MD    1 month ago Fall, initial encounter    [facility] Ac Keating MD    2 months ago Right knee pain, unspecified chronicity    [facility] Nallely Jurado MD          Future Appointments              In 2 weeks Nikita Martínez DPM [facility]    In 1 month Nallely Jurado MD [facility]                Passed - HBA1C is 7.9 or below and within 5 years.     Hemoglobin A1C   Date Value Ref Range Status   11/02/2019 5.9 (H) 4.0 - 5.6 % Final     Comment:     ADA Screening Guidelines:  5.7-6.4%  Consistent with prediabetes  >or=6.5%  Consistent with diabetes  High levels of fetal hemoglobin interfere with the HbA1C  assay. Heterozygous hemoglobin variants (HbS, HgC, etc)do  not significantly interfere with this assay.   However, presence of multiple variants may affect accuracy.               blood sugar diagnostic Strp 100 strip 0     Sig: To check BG daily, to use with insurance preferred meter       Endocrinology: Diabetes - Supplies Passed - 12/31/2019  4:09 PM        Passed - Patient is at least  18 years old        Passed - Office visit in past 12 months or future 90 days.     Recent Outpatient Visits            3 weeks ago Acute right ankle pain    [facility]    1 month ago Fall, initial encounter    [facility] Ke[facility] MD    2 months ago Right knee pain, unspecified chronicity    [facility] Nallely Jurado MD          Future Appointments              In 2 weeks RIYA Billy  [facility]    In 1 month Nallely Jurado MD [facility]                Passed - HBA1C is 7.9 or below and within 5 years.     Hemoglobin A1C   Date Value Ref Range Status   11/02/2019 5.9 (H) 4.0 - 5.6 % Final     Comment:     ADA Screening Guidelines:  5.7-6.4%  Consistent with prediabetes  >or=6.5%  Consistent with diabetes  High levels of fetal hemoglobin interfere with the HbA1C  assay. Heterozygous hemoglobin variants (HbS, HgC, etc)do  not significantly interfere with this assay.   However, presence of multiple variants may affect accuracy.               lancets Misc 100 each 0     Sig: To check BG daily, to use with insurance preferred meter       Endocrinology: Diabetes - Supplies Passed - 12/31/2019  4:09 PM        Passed - Patient is at least 18 years old        Passed - Office visit in past 12 months or future 90 days.     Recent Outpatient Visits            3 weeks ago Acute right ankle pain    [facility] Keating MD    1 month ago Fall, initial encounter    [facility] Keating MD    2 months ago Right knee pain, unspecified chronicity    [facility] Nallely Jurado MD          Future Appointments              In 2 weeks RIYA Billy  [facility]    In 1 month Nallely Jurado MD [facility]                Passed - HBA1C is 7.9 or below and within 5 years.     Hemoglobin A1C    Date Value Ref Range Status   11/02/2019 5.9 (H) 4.0 - 5.6 % Final     Comment:     ADA Screening Guidelines:  5.7-6.4%  Consistent with prediabetes  >or=6.5%  Consistent with diabetes  High levels of fetal hemoglobin interfere with the HbA1C  assay. Heterozygous hemoglobin variants (HbS, HgC, etc)do  not significantly interfere with this assay.   However, presence of multiple variants may affect accuracy.

## 2020-01-02 RX ORDER — INSULIN PUMP SYRINGE, 3 ML
EACH MISCELLANEOUS
Qty: 1 EACH | Refills: 0 | Status: ON HOLD | OUTPATIENT
Start: 2020-01-02 | End: 2022-05-19 | Stop reason: HOSPADM

## 2020-01-02 RX ORDER — LANCETS
EACH MISCELLANEOUS
Qty: 100 EACH | Refills: 0 | Status: ON HOLD | OUTPATIENT
Start: 2020-01-02 | End: 2022-05-19 | Stop reason: HOSPADM

## 2020-01-06 NOTE — TELEPHONE ENCOUNTER
----- Message from Leah Chapman sent at 1/6/2020 12:49 PM CST -----  Type: Needs Medical Advice    Who Called:  Connie   Pharmacy name and phone #: [facility]  [phone]  Best Call Back Number:   [phone]  Additional Information: requesting refill metoprolol tartrate (LOPRESSOR) 50 MG tablet    Thank you,

## 2020-01-07 RX ORDER — METOPROLOL TARTRATE 50 MG/1
100 TABLET ORAL 2 TIMES DAILY
Qty: 360 TABLET | Refills: 3 | Status: SHIPPED | OUTPATIENT
Start: 2020-01-07 | End: 2020-10-28

## 2020-01-14 ENCOUNTER — LAB VISIT (OUTPATIENT)
Dept: LAB | Facility: HOSPITAL | Age: 65
End: 2020-01-14
Attending: INTERNAL MEDICINE
Payer: MEDICARE

## 2020-01-14 ENCOUNTER — OFFICE VISIT (OUTPATIENT)
Dept: PODIATRY | Facility: CLINIC | Age: 65
End: 2020-01-14
Payer: MEDICARE

## 2020-01-14 VITALS
SYSTOLIC BLOOD PRESSURE: 141 MMHG | HEART RATE: 64 BPM | WEIGHT: 283.06 LBS | DIASTOLIC BLOOD PRESSURE: 80 MMHG | HEIGHT: 76 IN | BODY MASS INDEX: 34.47 KG/M2

## 2020-01-14 DIAGNOSIS — L60.1 ONYCHOLYSIS OF TOENAIL: ICD-10-CM

## 2020-01-14 DIAGNOSIS — E11.9 TYPE 2 DIABETES MELLITUS WITHOUT COMPLICATION: ICD-10-CM

## 2020-01-14 DIAGNOSIS — E11.9 TYPE 2 DIABETES MELLITUS WITHOUT COMPLICATION, WITHOUT LONG-TERM CURRENT USE OF INSULIN: Primary | ICD-10-CM

## 2020-01-14 LAB
ALBUMIN/CREAT UR: 7.9 UG/MG (ref 0–30)
CREAT UR-MCNC: 254 MG/DL (ref 23–375)
MICROALBUMIN UR DL<=1MG/L-MCNC: 20 UG/ML

## 2020-01-14 PROCEDURE — 99499 UNLISTED E&M SERVICE: CPT | Mod: HCNC,S$GLB,, | Performed by: PODIATRIST

## 2020-01-14 PROCEDURE — 99203 OFFICE O/P NEW LOW 30 MIN: CPT | Mod: HCNC,S$GLB,, | Performed by: PODIATRIST

## 2020-01-14 PROCEDURE — 99203 PR OFFICE/OUTPT VISIT, NEW, LEVL III, 30-44 MIN: ICD-10-PCS | Mod: HCNC,S$GLB,, | Performed by: PODIATRIST

## 2020-01-14 PROCEDURE — 3077F PR MOST RECENT SYSTOLIC BLOOD PRESSURE >= 140 MM HG: ICD-10-PCS | Mod: HCNC,CPTII,S$GLB, | Performed by: PODIATRIST

## 2020-01-14 PROCEDURE — 3044F HG A1C LEVEL LT 7.0%: CPT | Mod: HCNC,CPTII,S$GLB, | Performed by: PODIATRIST

## 2020-01-14 PROCEDURE — 99499 RISK ADDL DX/OHS AUDIT: ICD-10-PCS | Mod: HCNC,S$GLB,, | Performed by: PODIATRIST

## 2020-01-14 PROCEDURE — 3079F PR MOST RECENT DIASTOLIC BLOOD PRESSURE 80-89 MM HG: ICD-10-PCS | Mod: HCNC,CPTII,S$GLB, | Performed by: PODIATRIST

## 2020-01-14 PROCEDURE — 99999 PR PBB SHADOW E&M-EST. PATIENT-LVL III: CPT | Mod: PBBFAC,HCNC,, | Performed by: PODIATRIST

## 2020-01-14 PROCEDURE — 82043 UR ALBUMIN QUANTITATIVE: CPT | Mod: HCNC

## 2020-01-14 PROCEDURE — 3077F SYST BP >= 140 MM HG: CPT | Mod: HCNC,CPTII,S$GLB, | Performed by: PODIATRIST

## 2020-01-14 PROCEDURE — 3079F DIAST BP 80-89 MM HG: CPT | Mod: HCNC,CPTII,S$GLB, | Performed by: PODIATRIST

## 2020-01-14 PROCEDURE — 3008F BODY MASS INDEX DOCD: CPT | Mod: HCNC,CPTII,S$GLB, | Performed by: PODIATRIST

## 2020-01-14 PROCEDURE — 3044F PR MOST RECENT HEMOGLOBIN A1C LEVEL <7.0%: ICD-10-PCS | Mod: HCNC,CPTII,S$GLB, | Performed by: PODIATRIST

## 2020-01-14 PROCEDURE — 3008F PR BODY MASS INDEX (BMI) DOCUMENTED: ICD-10-PCS | Mod: HCNC,CPTII,S$GLB, | Performed by: PODIATRIST

## 2020-01-14 PROCEDURE — 99999 PR PBB SHADOW E&M-EST. PATIENT-LVL III: ICD-10-PCS | Mod: PBBFAC,HCNC,, | Performed by: PODIATRIST

## 2020-01-14 NOTE — LETTER
January 26, 2020      Nallely Jurado MD  [address] [facility] [address] [place] [address]           [facility]  [address] [place] [address]  Phone: [phone]          Patient: Samuel Cast   MR Number: [id number]   YOB: 1955   Date of Visit: 1/14/2020       Dear Dr. Nallely Jurado:    Thank you for referring Samuel Cast to me for evaluation. Attached you will find relevant portions of my assessment and plan of care.    If you have questions, please do not hesitate to call me. I look forward to following Samuel Cast along with you.    Sincerely,    Nikita Martínez, DPRONNI    Enclosure  CC:  No Recipients    If you would like to receive this communication electronically, please contact [email or web address] or [phone] to request more information on [employer] Link access.    For providers and/or their staff who would like to refer a patient to [facility], please contact us through our one-stop-shop provider referral line, [facility], at [phone].    If you feel you have received this communication in error or would no longer like to receive these types of communications, please e-mail [email or web address]

## 2020-01-17 ENCOUNTER — PATIENT MESSAGE (OUTPATIENT)
Dept: ORTHOPEDICS | Facility: CLINIC | Age: 65
End: 2020-01-17

## 2020-01-17 DIAGNOSIS — E11.9 TYPE 2 DIABETES MELLITUS WITHOUT COMPLICATION, UNSPECIFIED WHETHER LONG TERM INSULIN USE: ICD-10-CM

## 2020-01-20 ENCOUNTER — PATIENT MESSAGE (OUTPATIENT)
Dept: ORTHOPEDICS | Facility: CLINIC | Age: 65
End: 2020-01-20

## 2020-01-20 ENCOUNTER — TELEPHONE (OUTPATIENT)
Dept: ORTHOPEDICS | Facility: CLINIC | Age: 65
End: 2020-01-20

## 2020-01-24 ENCOUNTER — OFFICE VISIT (OUTPATIENT)
Dept: ORTHOPEDICS | Facility: CLINIC | Age: 65
End: 2020-01-24
Payer: MEDICARE

## 2020-01-24 VITALS — HEIGHT: 76 IN | WEIGHT: 283.06 LBS | BODY MASS INDEX: 34.47 KG/M2

## 2020-01-24 DIAGNOSIS — M25.571 ACUTE RIGHT ANKLE PAIN: Primary | ICD-10-CM

## 2020-01-24 DIAGNOSIS — R26.9 GAIT ABNORMALITY: ICD-10-CM

## 2020-01-24 DIAGNOSIS — M25.561 ACUTE PAIN OF RIGHT KNEE: ICD-10-CM

## 2020-01-24 PROCEDURE — 99999 PR PBB SHADOW E&M-EST. PATIENT-LVL III: ICD-10-PCS | Mod: PBBFAC,HCNC,, | Performed by: ORTHOPAEDIC SURGERY

## 2020-01-24 PROCEDURE — 99999 PR PBB SHADOW E&M-EST. PATIENT-LVL III: CPT | Mod: PBBFAC,HCNC,, | Performed by: ORTHOPAEDIC SURGERY

## 2020-01-24 PROCEDURE — 99213 PR OFFICE/OUTPT VISIT, EST, LEVL III, 20-29 MIN: ICD-10-PCS | Mod: HCNC,S$GLB,, | Performed by: ORTHOPAEDIC SURGERY

## 2020-01-24 PROCEDURE — 3008F PR BODY MASS INDEX (BMI) DOCUMENTED: ICD-10-PCS | Mod: HCNC,CPTII,S$GLB, | Performed by: ORTHOPAEDIC SURGERY

## 2020-01-24 PROCEDURE — 99213 OFFICE O/P EST LOW 20 MIN: CPT | Mod: HCNC,S$GLB,, | Performed by: ORTHOPAEDIC SURGERY

## 2020-01-24 PROCEDURE — 3008F BODY MASS INDEX DOCD: CPT | Mod: HCNC,CPTII,S$GLB, | Performed by: ORTHOPAEDIC SURGERY

## 2020-01-24 NOTE — PROGRESS NOTES
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  States he is feeling much better but he and his wife for interested in getting him back to walking.  He reports that prior to this incident where he injured his right lower extremity he was able to walk with an assistive device    Exam shows inability to extend the right knee actively or flex the right hip actively    Plan:  We will get him set up with home health in hopes of trying to get him back to ambulating with assistive devices again.  We will see him back in our orthopedic surgery clinic on as-needed basis

## 2020-01-27 NOTE — PROGRESS NOTES
Subjective:      Patient ID: Samuel Cast is a 64 y.o. male.    Chief Complaint: Nail Problem (Right great toenail came off, PCP--10/31/2019) and Diabetes Mellitus (A1c-5.9-11/02/2019)    Samuel is a 64 y.o. male who presents to the clinic upon referral from Dr. Jurado  for evaluation and treatment of diabetic feet. Samuel has no past medical history on file. Patient relates no major problem with feet. Only complaints today consist of concern over right great toenail that has fallen off, underlying skin he has not noted redness or drainage..    PCP: Nallely Jurado MD    Date Last Seen by PCP: 10/31/20    Current shoe gear: Casual shoes    Hemoglobin A1C   Date Value Ref Range Status   11/02/2019 5.9 (H) 4.0 - 5.6 % Final     Comment:     ADA Screening Guidelines:  5.7-6.4%  Consistent with prediabetes  >or=6.5%  Consistent with diabetes  High levels of fetal hemoglobin interfere with the HbA1C  assay. Heterozygous hemoglobin variants (HbS, HgC, etc)do  not significantly interfere with this assay.   However, presence of multiple variants may affect accuracy.             Review of Systems   Constitution: Negative for chills and fever.   Cardiovascular: Positive for leg swelling. Negative for claudication.   Respiratory: Negative for shortness of breath.    Skin: Positive for nail changes. Negative for itching and rash.   Musculoskeletal: Negative for muscle cramps, muscle weakness and myalgias.   Gastrointestinal: Negative for nausea and vomiting.   Neurological: Negative for focal weakness, loss of balance, numbness and paresthesias.           Objective:      Physical Exam   Constitutional: He is oriented to person, place, and time. He appears well-developed and well-nourished. No distress.   Cardiovascular:   Pulses:       Dorsalis pedis pulses are 2+ on the right side, and 2+ on the left side.        Posterior tibial pulses are 2+ on the right side, and 2+ on the left side.   < 3 sec capillary refill time  to toes 1-5 bilateral. Toes and feet are warm to touch proximally with normal distal cooling b/l. There is no hair growth on the feet and toes b/l. There is edema b/l. No spider veins or varicosities present b/l.      Musculoskeletal:   Equinus noted b/l ankles with < 10 deg DF noted. MMT 5/5 in DF/PF/Inv/Ev resistance with no reproduction of pain in any direction. Passive range of motion of ankle and pedal joints is painless b/l.     Neurological: He is alert and oriented to person, place, and time. He has normal strength. He displays no atrophy and no tremor. No sensory deficit. He exhibits normal muscle tone.   Negative tinel sign bilateral.   Skin: Skin is warm, dry and intact. No abrasion, no bruising, no burn, no ecchymosis, no laceration, no lesion, no petechiae and no rash noted. He is not diaphoretic. No cyanosis or erythema. No pallor. Nails show no clubbing.   Skin is thin and atrophic bilateral    Right great toenail gone with mostly healed underlying nail bed     Psychiatric: He has a normal mood and affect. His behavior is normal.             Assessment:       Encounter Diagnoses   Name Primary?    Type 2 diabetes mellitus without complication, without long-term current use of insulin Yes    Onycholysis of toenail          Plan:       Samuel was seen today for nail problem and diabetes mellitus.    Diagnoses and all orders for this visit:    Type 2 diabetes mellitus without complication, without long-term current use of insulin    Onycholysis of toenail      I counseled the patient on his conditions, their implications and medical management.    Nail is gone and skin is well healed    Shoe inspection. Diabetic Foot Education. Patient reminded of the importance of good nutrition and blood sugar control to help prevent podiatric complications of diabetes. Patient instructed on proper foot hygeine. We discussed wearing proper shoe gear, daily foot inspections, never walking without protective shoe gear,  never putting sharp instruments to feet    Return annual visit or sooner PRN eoutine care    Niikta Martínez, ZULYM

## 2020-01-30 RX ORDER — SERTRALINE HYDROCHLORIDE 25 MG/1
25 TABLET, FILM COATED ORAL DAILY
Qty: 30 TABLET | Refills: 0 | Status: SHIPPED | OUTPATIENT
Start: 2020-01-30 | End: 2020-02-04

## 2020-02-04 ENCOUNTER — OFFICE VISIT (OUTPATIENT)
Dept: FAMILY MEDICINE | Facility: CLINIC | Age: 65
End: 2020-02-04
Payer: MEDICARE

## 2020-02-04 VITALS
TEMPERATURE: 98 F | BODY MASS INDEX: 32.32 KG/M2 | HEIGHT: 76 IN | OXYGEN SATURATION: 98 % | DIASTOLIC BLOOD PRESSURE: 78 MMHG | SYSTOLIC BLOOD PRESSURE: 128 MMHG | HEART RATE: 64 BPM | WEIGHT: 265.44 LBS

## 2020-02-04 DIAGNOSIS — I10 HYPERTENSION, UNSPECIFIED TYPE: ICD-10-CM

## 2020-02-04 DIAGNOSIS — E11.9 TYPE 2 DIABETES MELLITUS WITHOUT COMPLICATION, WITHOUT LONG-TERM CURRENT USE OF INSULIN: Primary | ICD-10-CM

## 2020-02-04 DIAGNOSIS — Z86.73 HISTORY OF CARDIOEMBOLIC CEREBROVASCULAR ACCIDENT (CVA): ICD-10-CM

## 2020-02-04 PROCEDURE — 99999 PR PBB SHADOW E&M-EST. PATIENT-LVL IV: CPT | Mod: PBBFAC,HCNC,, | Performed by: INTERNAL MEDICINE

## 2020-02-04 PROCEDURE — 3078F PR MOST RECENT DIASTOLIC BLOOD PRESSURE < 80 MM HG: ICD-10-PCS | Mod: HCNC,CPTII,S$GLB, | Performed by: INTERNAL MEDICINE

## 2020-02-04 PROCEDURE — 90732 PPSV23 VACC 2 YRS+ SUBQ/IM: CPT | Mod: HCNC,S$GLB,, | Performed by: INTERNAL MEDICINE

## 2020-02-04 PROCEDURE — 3008F BODY MASS INDEX DOCD: CPT | Mod: HCNC,CPTII,S$GLB, | Performed by: INTERNAL MEDICINE

## 2020-02-04 PROCEDURE — 3078F DIAST BP <80 MM HG: CPT | Mod: HCNC,CPTII,S$GLB, | Performed by: INTERNAL MEDICINE

## 2020-02-04 PROCEDURE — 99999 PR PBB SHADOW E&M-EST. PATIENT-LVL IV: ICD-10-PCS | Mod: PBBFAC,HCNC,, | Performed by: INTERNAL MEDICINE

## 2020-02-04 PROCEDURE — 3074F SYST BP LT 130 MM HG: CPT | Mod: HCNC,CPTII,S$GLB, | Performed by: INTERNAL MEDICINE

## 2020-02-04 PROCEDURE — 3044F HG A1C LEVEL LT 7.0%: CPT | Mod: HCNC,CPTII,S$GLB, | Performed by: INTERNAL MEDICINE

## 2020-02-04 PROCEDURE — 3044F PR MOST RECENT HEMOGLOBIN A1C LEVEL <7.0%: ICD-10-PCS | Mod: HCNC,CPTII,S$GLB, | Performed by: INTERNAL MEDICINE

## 2020-02-04 PROCEDURE — 3074F PR MOST RECENT SYSTOLIC BLOOD PRESSURE < 130 MM HG: ICD-10-PCS | Mod: HCNC,CPTII,S$GLB, | Performed by: INTERNAL MEDICINE

## 2020-02-04 PROCEDURE — 99214 PR OFFICE/OUTPT VISIT, EST, LEVL IV, 30-39 MIN: ICD-10-PCS | Mod: HCNC,25,S$GLB, | Performed by: INTERNAL MEDICINE

## 2020-02-04 PROCEDURE — 99214 OFFICE O/P EST MOD 30 MIN: CPT | Mod: HCNC,25,S$GLB, | Performed by: INTERNAL MEDICINE

## 2020-02-04 PROCEDURE — 90732 PNEUMOCOCCAL POLYSACCHARIDE VACCINE 23-VALENT =>2YO SQ IM: ICD-10-PCS | Mod: HCNC,S$GLB,, | Performed by: INTERNAL MEDICINE

## 2020-02-04 PROCEDURE — 3008F PR BODY MASS INDEX (BMI) DOCUMENTED: ICD-10-PCS | Mod: HCNC,CPTII,S$GLB, | Performed by: INTERNAL MEDICINE

## 2020-02-04 PROCEDURE — G0009 ADMIN PNEUMOCOCCAL VACCINE: HCPCS | Mod: HCNC,S$GLB,, | Performed by: INTERNAL MEDICINE

## 2020-02-04 PROCEDURE — G0009 PNEUMOCOCCAL POLYSACCHARIDE VACCINE 23-VALENT =>2YO SQ IM: ICD-10-PCS | Mod: HCNC,S$GLB,, | Performed by: INTERNAL MEDICINE

## 2020-02-04 RX ORDER — SERTRALINE HYDROCHLORIDE 50 MG/1
50 TABLET, FILM COATED ORAL DAILY
Qty: 90 TABLET | Refills: 3 | Status: SHIPPED | OUTPATIENT
Start: 2020-02-04 | End: 2020-11-24

## 2020-02-04 RX ORDER — GLIMEPIRIDE 4 MG/1
4 TABLET ORAL
Qty: 90 TABLET | Refills: 2 | Status: SHIPPED | OUTPATIENT
Start: 2020-02-04 | End: 2020-12-15

## 2020-02-04 RX ORDER — TRAZODONE HYDROCHLORIDE 50 MG/1
50 TABLET ORAL NIGHTLY
Qty: 90 TABLET | Refills: 3 | Status: SHIPPED | OUTPATIENT
Start: 2020-02-04 | End: 2020-11-24

## 2020-02-04 NOTE — PROGRESS NOTES
Subjective:       Patient ID: Samuel Cast is a 64 y.o. male.    Chief Complaint: Follow-up (3 month follow-up)    Pt here for check up chronic conditions    DM: on metformin and amaryl. Last a1c 5.9.  Not checking blod sugar  Hx stroke- stable on asa, statin, bb. bp good  Depression- uncontrolled on zoloft   htn- stable on torpol    Review of Systems   Constitutional: Positive for activity change. Negative for unexpected weight change.   HENT: Negative for hearing loss, rhinorrhea and trouble swallowing.    Eyes: Negative for discharge and visual disturbance.   Respiratory: Negative for chest tightness and wheezing.    Cardiovascular: Negative for chest pain and palpitations.   Gastrointestinal: Negative for blood in stool, constipation, diarrhea and vomiting.   Endocrine: Negative for polydipsia and polyuria.   Genitourinary: Negative for difficulty urinating, hematuria and urgency.   Musculoskeletal: Negative for arthralgias, joint swelling and neck pain.   Neurological: Negative for weakness and headaches.   Psychiatric/Behavioral: Negative for confusion and dysphoric mood.       Objective:      Physical Exam   Constitutional: He is oriented to person, place, and time. He appears well-developed and well-nourished.   HENT:   Head: Normocephalic and atraumatic.   Mouth/Throat: Oropharynx is clear and moist.   Eyes: Pupils are equal, round, and reactive to light. Conjunctivae and EOM are normal.   Neck: Normal range of motion. Neck supple. No thyromegaly present.   Cardiovascular: Normal rate, regular rhythm, normal heart sounds and intact distal pulses. Exam reveals no gallop and no friction rub.   No murmur heard.  Pulmonary/Chest: Effort normal and breath sounds normal. No respiratory distress. He has no wheezes. He has no rales.   Abdominal: Soft. Bowel sounds are normal. He exhibits no distension. There is no tenderness.   Musculoskeletal: Normal range of motion. He exhibits no edema.   Lymphadenopathy:     He  has no cervical adenopathy.   Neurological: He is alert and oriented to person, place, and time. No cranial nerve deficit.   Skin: Skin is warm and dry.   Psychiatric: He has a normal mood and affect. His behavior is normal.       Assessment:       1. Type 2 diabetes mellitus without complication, without long-term current use of insulin    2. Hypertension, unspecified type    3. History of cardioembolic cerebrovascular accident (CVA)        Plan:       htn- stable continue med  Hx cva- stable. On dilantin for prophylaxis.  No seizures. Under going therapy  Dm- check a1c 3 mo. Was 5.9 3 mo ago. utd eye and foot exam

## 2020-02-24 ENCOUNTER — TELEPHONE (OUTPATIENT)
Dept: ORTHOPEDICS | Facility: CLINIC | Age: 65
End: 2020-02-24

## 2020-02-24 NOTE — TELEPHONE ENCOUNTER
----- Message from Tatyana Schmidt MA sent at 2/24/2020 12:40 PM CST -----  Contact: carlos alberto caputo   Needs PT orders   Fax   Call back

## 2020-02-28 ENCOUNTER — TELEPHONE (OUTPATIENT)
Dept: ORTHOPEDICS | Facility: CLINIC | Age: 65
End: 2020-02-28

## 2020-03-03 ENCOUNTER — PATIENT MESSAGE (OUTPATIENT)
Dept: ORTHOPEDICS | Facility: CLINIC | Age: 65
End: 2020-03-03

## 2020-03-03 ENCOUNTER — TELEPHONE (OUTPATIENT)
Dept: ORTHOPEDICS | Facility: CLINIC | Age: 65
End: 2020-03-03

## 2020-03-03 DIAGNOSIS — M25.561 ACUTE PAIN OF RIGHT KNEE: ICD-10-CM
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
## 2020-03-03 DIAGNOSIS — S83.91XD SPRAIN OF RIGHT KNEE, UNSPECIFIED LIGAMENT, SUBSEQUENT ENCOUNTER: ICD-10-CM

## 2020-03-03 DIAGNOSIS — R26.9 GAIT ABNORMALITY: ICD-10-CM
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
## 2020-03-03 DIAGNOSIS — M25.571 ACUTE RIGHT ANKLE PAIN: Primary | ICD-10-CM

## 2020-03-23 ENCOUNTER — PATIENT MESSAGE (OUTPATIENT)
Dept: ADMINISTRATIVE | Facility: OTHER | Age: 65
End: 2020-03-23

## 2020-03-23 ENCOUNTER — PATIENT MESSAGE (OUTPATIENT)
Dept: FAMILY MEDICINE | Facility: CLINIC | Age: 65
End: 2020-03-23

## 2020-03-24 RX ORDER — PHENYTOIN SODIUM 100 MG/1
400 CAPSULE, EXTENDED RELEASE ORAL DAILY
Qty: 360 CAPSULE | Refills: 3 | Status: SHIPPED | OUTPATIENT
Start: 2020-03-24 | End: 2021-04-05 | Stop reason: SDUPTHER

## 2020-03-25 DIAGNOSIS — E11.9 TYPE 2 DIABETES MELLITUS: ICD-10-CM

## 2020-05-05 ENCOUNTER — PATIENT MESSAGE (OUTPATIENT)
Dept: ADMINISTRATIVE | Facility: HOSPITAL | Age: 65
End: 2020-05-05

## 2020-05-28 ENCOUNTER — PATIENT OUTREACH (OUTPATIENT)
Dept: OTHER | Facility: OTHER | Age: 65
End: 2020-05-28

## 2020-05-28 NOTE — LETTER
May 28, 2020     Samuel Cast  [address] [address] [facility] [address]       Dear Samuel,    Welcome to [facility]! Our goal is to make care effective, proactive and convenient by using data you send us from home to better treat your chronic conditions.              My name is Carolyn Bobby, and I am your dedicated Digital Medicine clinician. As an expert in medication management, I will help ensure that the medications you are taking continue to provide the intended benefits and help you reach your goals. You can reach me directly at [phone] or by sending me a message directly through your [facility] account.      I am Chas Evans and I will be your health . My job is to help you identify lifestyle changes to improve your disease control. We will talk about nutrition, exercise, and other ways you may be able to adjust your current habits to better your health. Additionally, we will help ensure you are completing the tests and screenings that are necessary to help manage your conditions. You can reach me directly at [phone] or by sending me a message directly through your [facility] account.    Most importantly, YOU are at the center of this team. Together, we will work to improve your overall health and encourage you to meet your goals for a healthier lifestyle.     What we expect from YOU:  · Please take frequent home blood pressure measurements. We ask that you take at least 1 blood pressure reading per week, but more information will better help us get you know you. Be sure you rest for a few minutes before taking the reading in a quiet, comfortable place.     Be available to receive phone calls or [employer] messages, when appropriate, from your care team. Please let us know if there are any specific days or times that work best for us to reach you via phone.     Complete routine tests and screenings. Dont worry, we will help keep you on track!           What you should  expect from your Digital Medicine Care Team:   We will work with you to create a personalized plan of care and provide you with encouragement and education, including regarding lifestyle changes, that could help you manage your disease states.     We will adjust your current medications, if needed, and continue to monitor your long-term progress.     We will provide you and your physician with monthly progress reports after you have been in the program for more than 30 days.     We will send you reminders through [employer] and text messages to help ensure you do not miss any testing deadlines to help manage your disease states.    You will be able to reach us by phone or through your [employer] account by clicking our names under Care Team on the right side of the home screen.    I look forward to working with you to achieve your blood pressure goals!    We look forward to working with you to help manage your health,    Sincerely,    Your Digital Medicine Team    Please visit our websites to learn more:   · Hypertension: [email or web address]      Remember, we are not available for emergencies. If you have an emergency, please contact your doctors office directly or call [facility] ([phone] or [phone]) or [phone].

## 2020-05-28 NOTE — PROGRESS NOTES
Digital Medicine: Health  Introduction    Introduced Samuel Segun to Digital Medicine. Discussed health  role and recommended lifestyle modifications.    The history is provided by the patient. No  was used.     HYPERTENSION  Our goal is to get BP to consistently below 130/80mmHg and make the process convenient so patient can avoid extra trips to the office. Getting your blood pressure below 130/80mmHg (definition of control) will reduce your risk for heart attack, kidney failure, stroke and death (as well as kidney failure, eye disease, & dementia)      Reviewed that the Digital Medicine care team - consisting of a clinician and a health  - will follow the most current evidence-based national guidelines for treating your condition.  The health  will focus on lifestyle modifications and motivation while the clinician will focus on medication therapy.  The care team will review all data on a regular basis and reach out as needed.      Explained that one of the key parts of the program is communication with the care team.  Asked patient to respond to outreach attempts and complete questionnaires.  Stressed importance of medication adherence.    Explained that we expect patient to obtain several blood pressures per week at random times of day.  Instructed patient not to allow anyone else to use phone and monitoring device.  Confirmed appropriate BP monitoring technique.      Explained to patient that the digital medicine team is not available for emergencies.  Patient will call [facility] ([phone] or [phone]) or [phone] if needed.      Patient's BP goal is 130/80.Patient's BP average is 134/83 mmHg, which is above goal, per 2017 ACC/AHA Hypertension Guidelines.          Last 5 Patient Entered Readings                                      Current 30 Day Average: 134/83     Recent Readings 5/27/2020 5/21/2020 5/20/2020 5/20/2020 5/19/2020    SBP (mmHg) 152 142 120 147  120    DBP (mmHg) 90 85 80 80 80    Pulse 63 58 80 57 80            Intervention/Plan        Topic    Eye Exam     Hemoglobin A1C        Reviewed the importance of self-monitoring, medication adherence, and that the health  can be used as a resource for lifestyle modifications to help reduce or maintain a healthy lifestyle.    Sent link to [facility] webpages and my contact information via [employer] for future questions. Follow up scheduled.         Screenings    SDOH

## 2020-06-04 ENCOUNTER — PATIENT OUTREACH (OUTPATIENT)
Dept: OTHER | Facility: OTHER | Age: 65
End: 2020-06-04

## 2020-06-04 NOTE — PROGRESS NOTES
Digital Medicine: Health  Introduction    Introduced Samuel Cast to Digital Medicine. Discussed health  role and recommended lifestyle modifications.    I called Mr. Baker to introduce myself but spoke to his wife, Mrs. Mcqueen, who is also my patient. She notes that he just started walking again. He suffered from a stroke a while ago, and his right side is paralyzed. She finds that the BP monitors are registering high. I encouraged her to charge them and really relax before hitting start. She picked up his glucometer today and plans on taking reading soon.     The history is provided by the spouse. No  was used.     HYPERTENSION  Our goal is to get BP to consistently below 130/80mmHg and make the process convenient so patient can avoid extra trips to the office. Getting your blood pressure below 130/80mmHg (definition of control) will reduce your risk for heart attack, kidney failure, stroke and death (as well as kidney failure, eye disease, & dementia)      Patient's BP goal is 130/80.Patient's BP average is 139/85 mmHg, which is above goal, per 2017 ACC/AHA Hypertension Guidelines.          Last 5 Patient Entered Readings                                      Current 30 Day Average: 139/85     Recent Readings 5/29/2020 5/27/2020 5/21/2020 5/20/2020 5/20/2020    SBP (mmHg) 160 152 142 120 147    DBP (mmHg) 100 90 85 80 80    Pulse 60 63 58 80 57            INTERVENTION(S)  denied questions    PLAN  continue monitoring          Topic    Eye Exam     Hemoglobin A1C        Reviewed the importance of self-monitoring, medication adherence, and that the health  can be used as a resource for lifestyle modifications to help reduce or maintain a healthy lifestyle.    Sent link to [facility] webpages and my contact information via [employer] for future questions. Follow up scheduled.             Diet Screening   He has the following dietary restrictions: low sodium  diet    Right now, Mr. Baker is living with his daughter. His son in law is following the keto diet, so a lot of their meals are low carb. He eats breakfast, a light lunch, and a light dinner. He drinks Fruit 2O (2-3/day) and coffee. He will sometimes drink V8 or juice. He doesn't like the taste of water.    Physical Activity Screening   When asked if exercising, patient responded: yes2 day(s) a week.      Patient participates in the following activities: physical therapy/rehab    He is going to physical therapy two times a week. He just started walking after an accident, but it's to and from the bedroom.     Medication Adherence Screening   He did not miss a dose this month.    Tobacco and Alcohol Screening       Patient is not eligible for referral to smoking cessation.        SDOH

## 2020-07-01 ENCOUNTER — PATIENT OUTREACH (OUTPATIENT)
Dept: OTHER | Facility: OTHER | Age: 65
End: 2020-07-01

## 2020-07-01 NOTE — PROGRESS NOTES
Digital Medicine: Health  Introduction    Introduced Samuel Cast to Digital Medicine. Discussed health  role and recommended lifestyle modifications.    Patient is newly enrolled in the diabetes program. His wife was bringing him to physical therapy. She notes his BG today was very high (200+). Those readings did not sync through. I encouraged her to sign into [employer]. I will send her a message via email with glucometer set up instructions since she was rushed. I will complete diabetes enrollment at next encounter.             Last 6 Patient Entered Readings                                          Most Recent A1c:      Recent Readings 6/16/2020 6/15/2020 6/11/2020 6/8/2020 6/6/2020    Blood Glucose (mg/dL) 116 134 146 170 165          Intervention/Plan        Topic    Eye Exam     Hemoglobin A1C        Reviewed the importance of self-monitoring, medication adherence, and that the health  can be used as a resource for lifestyle modifications to help reduce or maintain a healthy lifestyle.    Sent link to [facility] webpages and my contact information via [employer] for future questions. Follow up scheduled.         Screenings    SDOH

## 2020-07-15 ENCOUNTER — TELEPHONE (OUTPATIENT)
Dept: ORTHOPEDICS | Facility: CLINIC | Age: 65
End: 2020-07-15

## 2020-07-15 NOTE — TELEPHONE ENCOUNTER
----- Message from Gilberto Khan sent at 7/15/2020 10:36 AM CDT -----  Regarding: PT wants OT order  Please call Vaishnavi with [facility]  [phone]  ,fax   [phone]

## 2020-07-24 ENCOUNTER — PATIENT OUTREACH (OUTPATIENT)
Dept: ADMINISTRATIVE | Facility: OTHER | Age: 65
End: 2020-07-24

## 2020-07-28 ENCOUNTER — OFFICE VISIT (OUTPATIENT)
Dept: OPTOMETRY | Facility: CLINIC | Age: 65
End: 2020-07-28
Payer: MEDICARE

## 2020-07-28 DIAGNOSIS — H52.4 HYPEROPIA WITH ASTIGMATISM AND PRESBYOPIA, BILATERAL: ICD-10-CM

## 2020-07-28 DIAGNOSIS — H52.203 HYPEROPIA WITH ASTIGMATISM AND PRESBYOPIA, BILATERAL: ICD-10-CM

## 2020-07-28 DIAGNOSIS — E11.9 DIABETES MELLITUS TYPE 2 WITHOUT RETINOPATHY: Primary | ICD-10-CM

## 2020-07-28 DIAGNOSIS — H52.03 HYPEROPIA WITH ASTIGMATISM AND PRESBYOPIA, BILATERAL: ICD-10-CM

## 2020-07-28 DIAGNOSIS — H43.393 VITREOUS FLOATERS, BILATERAL: ICD-10-CM

## 2020-07-28 DIAGNOSIS — Z13.5 GLAUCOMA SCREENING: ICD-10-CM

## 2020-07-28 PROCEDURE — 99499 RISK ADDL DX/OHS AUDIT: ICD-10-PCS | Mod: HCNC,S$GLB,, | Performed by: OPTOMETRIST

## 2020-07-28 PROCEDURE — 92015 PR REFRACTION: ICD-10-PCS | Mod: HCNC,S$GLB,, | Performed by: OPTOMETRIST

## 2020-07-28 PROCEDURE — 99499 UNLISTED E&M SERVICE: CPT | Mod: HCNC,S$GLB,, | Performed by: OPTOMETRIST

## 2020-07-28 PROCEDURE — 92004 COMPRE OPH EXAM NEW PT 1/>: CPT | Mod: HCNC,S$GLB,, | Performed by: OPTOMETRIST

## 2020-07-28 PROCEDURE — 99999 PR PBB SHADOW E&M-EST. PATIENT-LVL III: CPT | Mod: PBBFAC,HCNC,, | Performed by: OPTOMETRIST

## 2020-07-28 PROCEDURE — 92004 PR EYE EXAM, NEW PATIENT,COMPREHESV: ICD-10-PCS | Mod: HCNC,S$GLB,, | Performed by: OPTOMETRIST

## 2020-07-28 PROCEDURE — 92015 DETERMINE REFRACTIVE STATE: CPT | Mod: HCNC,S$GLB,, | Performed by: OPTOMETRIST

## 2020-07-28 PROCEDURE — 99999 PR PBB SHADOW E&M-EST. PATIENT-LVL III: ICD-10-PCS | Mod: PBBFAC,HCNC,, | Performed by: OPTOMETRIST

## 2020-07-28 NOTE — PROGRESS NOTES
HPI     Routine eye diabetic exam-dle-5 years    Pt denies any blurred vision with glasses. Needing updated glasses rx.   Denies any eye pain. No flashes or floaters. Just moved to [place] from [place]. Had   stroke 13 years ago. BSL controlled.    Hemoglobin A1C       Date                     Value               Ref Range             Status                11/02/2019               5.9 (H)             4.0 - 5.6 %           Final              Comment:    ADA Screening Guidelines:  5.7-6.4%  Consistent with   prediabetes  >or=6.5%  Consistent with diabetes  High levels of fetal   hemoglobin interfere with the HbA1C  assay. Heterozygous hemoglobin   variants (HbS, HgC, etc)do  not significantly interfere with this assay.     However, presence of multiple variants may affect accuracy.    ----------    Agree above  Glucose high  BP reported normal   No other new issues      Last edited by LASHAWN Villeda, OD on 7/28/2020 10:04 AM. (History)        ROS     Positive for: Eyes    Negative for: Constitutional, Gastrointestinal, Neurological, Skin,   Genitourinary, Musculoskeletal, HENT, Endocrine, Cardiovascular,   Respiratory, Psychiatric, Allergic/Imm, Heme/Lymph    Last edited by LASHAWN Villeda, OD on 7/28/2020 10:04 AM. (History)        Assessment /Plan     For exam results, see Encounter Report.    Diabetes mellitus type 2 without retinopathy    Vitreous floaters, bilateral    Glaucoma screening    Hyperopia with astigmatism and presbyopia, bilateral      1. No orestes/ retinopathy, no csme, gave Diabetic Retinopathy info, control glucose and BP  Advise annual DFE  2. Mild OU, RD precautions given and reviewed. Patient knows to call/ message if any further changes in symptoms occur.  3. Not suspect  4. Updated specs rx, gave copy, fill prn  Ok continue with current    Discussed and educated patient on current findings /plan.  RTC 1 year, prn if any changes / issues

## 2020-07-28 NOTE — PATIENT INSTRUCTIONS
"DRY EYES -- BURNING OR CARRINGTON SYMPTOMS:  Use Over The Counter artificial tears as needed for dry eye symptoms.   Some common brands include:  Systane, Optive, Refresh, and Thera-Tears.  These drops can be used as frequently as desired, but may be most helpful use during long periods of concentrated work.  For example, reading / working at the computer. Start with 3-4x per day.     Nighttime Ophthalmic gel or ointments are available: Refresh PM, Genteal, and Lacrilube.    Avoid drops that "get redness out" (Visine, Murine, Clear Eyes), as these may contain medication that could further irritate the eyes, especially with chronic use.    ALLERGY EYES -- ITCHING SYMPTOMS:  Over the counter medications include--Pataday, Zaditor, and Alaway.  Use as directed 1-2 drops daily for symptoms of itching / watering eyes.  These drops will not help for dry eye or exposure symptoms.    REDNESS RELIEF:  Lumify---is a good redness reliever that will not cause irritation if used chronically.         FLASHES / FLOATERS / POSTERIOR VITREOUS DETACHMENT    Call the clinic if you have any further changes in symptoms.  Including:  Increased numbers of floaters or flashing lights, dimness or darkness that moves through or stays constant in your vision, or any pain in the eye (s).    You may sometimes see small specks or clouds moving in your field of vision.  They are called FLOATERS.  You can often see them when looking at a plain background, like a blank wall or blue tremayne.  Floaters are actually tiny clumps of gel or cells inside the VITREOUS, the clear jelly-like fluid that fills the inside of your eye.    While these objects look like they are in front of your eye, they are actually floating inside.  What you see are the shadows they cast on the RETINA, the nerve layer at the back of the eye that senses light and allows you to see.      POSTERIOR VITREOUS DETACHMENT    The appearance of new floaters may be alarming.  If you suddenly " develop new floaters, you should contact your eye care professional  right away.    The retina can tear if the shrinking vitreous pulls away from the wall of the eye.  This sometimes causes a small amount of bleeding in the eye that may appear as new floaters.    A torn retina is always a serious problem, since it can lead to a retinal detachment.  You should see your eye care professional as soon as possible if:     even one new floater appears suddenly;   you see sudden flashes of light;   you notice other symptoms, like the loss of side vision, or a curtain closes down in your vision        POSTERIOR VITREOUS DETACHMENT is more common for people who:     are nearsighted;   have had cataract surgery;   have had YAG laser surgery of the eye;   have had inflammation inside the eye;   are over age 60.      While some floaters may remain visible, many of them will fade over time and become less noticeable.  Even if you've had some floaters for years, you should have your eyes checked as soon as possible if you notice new ones.    FLASHING LIGHTS    When the vitreous gel rubs or pulls on the retina, you may see what look like flashing lights or lightning streaks.  These flashes can appear off and on for several weeks or months.      Some people experience flashes of light that appear as jagged lines or heat waves in both eyes, lasting 10-20 minutes.  These flashes are caused by a spasm of blood vessels in the brain, which is called a migraine.    If a headache follows these flashes, it's called a migraine headache.  If   no headache occurs, these flashes are called Ophthalmic or Ocular Migraine.            DIABETES AND THE EYE / DIABETIC RETINOPATHY    Diabetic retinopathy is a condition occurring in persons with diabetes, which causes progressive damage to the retina, the light sensitive lining at the back of the eye. It is a serious sight-threatening complication of diabetes.    Diabetic retinopathy is  the result of damage to the tiny blood vessels that nourish the retina. They leak blood and other fluids that cause swelling of retinal tissue and clouding of vision. The condition usually affects both eyes. The longer a person has diabetes, the more likely they will develop diabetic retinopathy. If left untreated, diabetic retinopathy can cause blindness.  There are two basic types of diabetic retinopathy:    Background or nonproliferative diabetic retinopathy (NPDR)  Nonproliferative diabetic retinopathy (NPDR) is the earliest stage of diabetic retinopathy. With this condition, damaged blood vessels in the retina begin to leak extra fluid and small amounts of blood into the eye. Sometimes, deposits of cholesterol or other fats from the blood may leak into the retina. Many people with diabetes have mild NPDR, which usually does not affect their vision. However, if their vision is affected, it is the result of macular edema and macular ischemia.    If vision is affected due to macular changes, a consult with a Retina Specialist may be advised.  This is an ophthalmologist that treats retina conditions, including diabetic retinopathy.     Proliferative diabetic retinopathy (PDR)  Proliferative diabetic retinopathy (PDR) mainly occurs when many of the blood vessels in the retina close, preventing enough blood flow. In an attempt to supply blood to the area where the original vessels closed, the retina responds by growing new blood vessels. This is called neovascularization. However, these new blood vessels are abnormal and do not supply the retina with proper blood flow. The new vessels are also often accompanied by scar tissue that may cause the retina to wrinkle or detach. PDR may cause more severe vision loss than NPDR because it can affect both central and peripheral vision.     A patient diagnosed with proliferative diabetic eye disease will be referred to a retinal specialist for consultation.    Often there are  no visual symptoms in the early stages of diabetic retinopathy. That is why our eye care professionals recommend that everyone with diabetes have a comprehensive dilated eye examination once a year. Early detection and treatment can limit the potential for significant vision loss from diabetic retinopathy.

## 2020-07-29 ENCOUNTER — PATIENT OUTREACH (OUTPATIENT)
Dept: OTHER | Facility: OTHER | Age: 65
End: 2020-07-29

## 2020-08-05 ENCOUNTER — LAB VISIT (OUTPATIENT)
Dept: LAB | Facility: HOSPITAL | Age: 65
End: 2020-08-05
Attending: INTERNAL MEDICINE
Payer: MEDICARE

## 2020-08-05 ENCOUNTER — OFFICE VISIT (OUTPATIENT)
Dept: FAMILY MEDICINE | Facility: CLINIC | Age: 65
End: 2020-08-05
Payer: MEDICARE

## 2020-08-05 VITALS
HEIGHT: 76 IN | BODY MASS INDEX: 33.24 KG/M2 | WEIGHT: 272.94 LBS | SYSTOLIC BLOOD PRESSURE: 138 MMHG | OXYGEN SATURATION: 96 % | TEMPERATURE: 99 F | HEART RATE: 60 BPM | DIASTOLIC BLOOD PRESSURE: 88 MMHG

## 2020-08-05 DIAGNOSIS — E11.9 TYPE 2 DIABETES MELLITUS WITHOUT COMPLICATION, WITHOUT LONG-TERM CURRENT USE OF INSULIN: ICD-10-CM

## 2020-08-05 DIAGNOSIS — I10 ESSENTIAL HYPERTENSION: ICD-10-CM

## 2020-08-05 DIAGNOSIS — Z09 FOLLOW UP: ICD-10-CM

## 2020-08-05 DIAGNOSIS — E11.9 TYPE 2 DIABETES MELLITUS WITHOUT COMPLICATION, WITHOUT LONG-TERM CURRENT USE OF INSULIN: Primary | ICD-10-CM

## 2020-08-05 DIAGNOSIS — Z86.73 HISTORY OF CARDIOEMBOLIC CEREBROVASCULAR ACCIDENT (CVA): ICD-10-CM

## 2020-08-05 LAB
ALBUMIN SERPL BCP-MCNC: 4 G/DL (ref 3.5–5.2)
ALP SERPL-CCNC: 92 U/L (ref 55–135)
ALT SERPL W/O P-5'-P-CCNC: 32 U/L (ref 10–44)
ANION GAP SERPL CALC-SCNC: 9 MMOL/L (ref 8–16)
AST SERPL-CCNC: 24 U/L (ref 10–40)
BILIRUB SERPL-MCNC: 0.4 MG/DL (ref 0.1–1)
BUN SERPL-MCNC: 17 MG/DL (ref 8–23)
CALCIUM SERPL-MCNC: 9.4 MG/DL (ref 8.7–10.5)
CHLORIDE SERPL-SCNC: 107 MMOL/L (ref 95–110)
CO2 SERPL-SCNC: 25 MMOL/L (ref 23–29)
CREAT SERPL-MCNC: 0.8 MG/DL (ref 0.5–1.4)
EST. GFR  (AFRICAN AMERICAN): >60 ML/MIN/1.73 M^2
EST. GFR  (NON AFRICAN AMERICAN): >60 ML/MIN/1.73 M^2
GLUCOSE SERPL-MCNC: 141 MG/DL (ref 70–110)
POTASSIUM SERPL-SCNC: 4.6 MMOL/L (ref 3.5–5.1)
PROT SERPL-MCNC: 7.5 G/DL (ref 6–8.4)
SODIUM SERPL-SCNC: 141 MMOL/L (ref 136–145)

## 2020-08-05 PROCEDURE — 3008F BODY MASS INDEX DOCD: CPT | Mod: HCNC,CPTII,S$GLB, | Performed by: NURSE PRACTITIONER

## 2020-08-05 PROCEDURE — 99999 PR PBB SHADOW E&M-EST. PATIENT-LVL V: CPT | Mod: PBBFAC,HCNC,, | Performed by: NURSE PRACTITIONER

## 2020-08-05 PROCEDURE — 3075F SYST BP GE 130 - 139MM HG: CPT | Mod: HCNC,CPTII,S$GLB, | Performed by: NURSE PRACTITIONER

## 2020-08-05 PROCEDURE — 3079F PR MOST RECENT DIASTOLIC BLOOD PRESSURE 80-89 MM HG: ICD-10-PCS | Mod: HCNC,CPTII,S$GLB, | Performed by: NURSE PRACTITIONER

## 2020-08-05 PROCEDURE — 83036 HEMOGLOBIN GLYCOSYLATED A1C: CPT | Mod: HCNC

## 2020-08-05 PROCEDURE — 99213 PR OFFICE/OUTPT VISIT, EST, LEVL III, 20-29 MIN: ICD-10-PCS | Mod: HCNC,S$GLB,, | Performed by: NURSE PRACTITIONER

## 2020-08-05 PROCEDURE — 99213 OFFICE O/P EST LOW 20 MIN: CPT | Mod: HCNC,S$GLB,, | Performed by: NURSE PRACTITIONER

## 2020-08-05 PROCEDURE — 36415 COLL VENOUS BLD VENIPUNCTURE: CPT | Mod: HCNC,PO

## 2020-08-05 PROCEDURE — 80053 COMPREHEN METABOLIC PANEL: CPT | Mod: HCNC

## 2020-08-05 PROCEDURE — 3008F PR BODY MASS INDEX (BMI) DOCUMENTED: ICD-10-PCS | Mod: HCNC,CPTII,S$GLB, | Performed by: NURSE PRACTITIONER

## 2020-08-05 PROCEDURE — 3075F PR MOST RECENT SYSTOLIC BLOOD PRESS GE 130-139MM HG: ICD-10-PCS | Mod: HCNC,CPTII,S$GLB, | Performed by: NURSE PRACTITIONER

## 2020-08-05 PROCEDURE — 3044F HG A1C LEVEL LT 7.0%: CPT | Mod: HCNC,CPTII,S$GLB, | Performed by: NURSE PRACTITIONER

## 2020-08-05 PROCEDURE — 3079F DIAST BP 80-89 MM HG: CPT | Mod: HCNC,CPTII,S$GLB, | Performed by: NURSE PRACTITIONER

## 2020-08-05 PROCEDURE — 3044F PR MOST RECENT HEMOGLOBIN A1C LEVEL <7.0%: ICD-10-PCS | Mod: HCNC,CPTII,S$GLB, | Performed by: NURSE PRACTITIONER

## 2020-08-05 PROCEDURE — 99999 PR PBB SHADOW E&M-EST. PATIENT-LVL V: ICD-10-PCS | Mod: PBBFAC,HCNC,, | Performed by: NURSE PRACTITIONER

## 2020-08-05 NOTE — PROGRESS NOTES
Subjective:       Patient ID: Samuel Cast is a 64 y.o. male.    Chief Complaint: Hypertension and Hyperglycemia    Patient who is new to me presents for Diabetes and hypertension. Patient is accompanied by wife who is the main historian. Had eye exam July 2020. Needs foot exam. Concerned about blood sugar, had a reading of 216, otherwise has been 160s. Patient does eat m&ms on occasion. His blood pressure has been elevated at PT. Patient is registered with the digital hypertension and having difficulty with the equipment, will follow up with the o bar. He has a history of stroke with right sided hemiparesis. He is improving with his ambulation, still uses a wheel chair.     Review of Systems   Constitutional: Negative for chills, fatigue and fever.   HENT: Negative for congestion, ear pain, sinus pressure and sore throat.    Respiratory: Negative for shortness of breath and wheezing.    Cardiovascular: Negative for chest pain and leg swelling.   Gastrointestinal: Negative for abdominal distention and abdominal pain.   Genitourinary: Negative for dysuria and flank pain.   Skin: Negative for color change and pallor.   Neurological: Negative for light-headedness, numbness and headaches.       Objective:      Physical Exam  Vitals signs and nursing note reviewed.   Constitutional:       Appearance: He is well-developed.   HENT:      Head: Normocephalic and atraumatic.      Right Ear: External ear normal.      Left Ear: External ear normal.   Eyes:      Conjunctiva/sclera: Conjunctivae normal.      Pupils: Pupils are equal, round, and reactive to light.   Neck:      Musculoskeletal: Normal range of motion and neck supple.   Cardiovascular:      Rate and Rhythm: Normal rate and regular rhythm.   Pulmonary:      Effort: Pulmonary effort is normal.      Breath sounds: Normal breath sounds.   Abdominal:      General: Bowel sounds are normal.      Palpations: Abdomen is soft.   Musculoskeletal: Normal range of motion.    Skin:     General: Skin is warm and dry.   Neurological:      Mental Status: He is alert and oriented to person, place, and time.      Motor: Weakness present.      Comments: Right sided hemiparesis          Assessment:       1. Type 2 diabetes mellitus without complication, without long-term current use of insulin    2. History of cardioembolic cerebrovascular accident (CVA)    3. Essential hypertension    4. Follow up        Plan:       Samuel was seen today for hypertension and hyperglycemia.    Diagnoses and all orders for this visit:    Type 2 diabetes mellitus without complication, without long-term current use of insulin  Continue current medications. Labs scheduled.   History of cardioembolic cerebrovascular accident (CVA)  Continue PT and current medications.   Essential hypertension  Follow up with digital hypertension.   Follow up    Follow up with Dr Jurado in 3 months.

## 2020-08-06 LAB
ESTIMATED AVG GLUCOSE: 105 MG/DL (ref 68–131)
HBA1C MFR BLD HPLC: 5.3 % (ref 4–5.6)

## 2020-08-09 ENCOUNTER — PATIENT OUTREACH (OUTPATIENT)
Dept: ADMINISTRATIVE | Facility: OTHER | Age: 65
End: 2020-08-09

## 2020-08-09 NOTE — PROGRESS NOTES
Requested updates within Care Everywhere.  Patient's chart was reviewed for overdue FLORA topics.  Immunizations reconciled.

## 2020-08-11 ENCOUNTER — OFFICE VISIT (OUTPATIENT)
Dept: PODIATRY | Facility: CLINIC | Age: 65
End: 2020-08-11
Payer: MEDICARE

## 2020-08-11 VITALS
WEIGHT: 272.94 LBS | HEIGHT: 76 IN | BODY MASS INDEX: 33.24 KG/M2 | DIASTOLIC BLOOD PRESSURE: 86 MMHG | SYSTOLIC BLOOD PRESSURE: 154 MMHG | HEART RATE: 59 BPM

## 2020-08-11 DIAGNOSIS — E11.9 TYPE 2 DIABETES MELLITUS WITHOUT COMPLICATION, WITHOUT LONG-TERM CURRENT USE OF INSULIN: Primary | ICD-10-CM

## 2020-08-11 DIAGNOSIS — E11.51 TYPE II DIABETES MELLITUS WITH PERIPHERAL CIRCULATORY DISORDER: ICD-10-CM

## 2020-08-11 DIAGNOSIS — I69.359 HEMIPARESIS FOLLOWING CEREBROVASCULAR ACCIDENT (CVA): ICD-10-CM

## 2020-08-11 DIAGNOSIS — L60.1 ONYCHOLYSIS OF TOENAIL: ICD-10-CM

## 2020-08-11 PROCEDURE — 99999 PR PBB SHADOW E&M-EST. PATIENT-LVL IV: CPT | Mod: PBBFAC,HCNC,, | Performed by: PODIATRIST

## 2020-08-11 PROCEDURE — 99499 RISK ADDL DX/OHS AUDIT: ICD-10-PCS | Mod: HCNC,S$GLB,, | Performed by: PODIATRIST

## 2020-08-11 PROCEDURE — 99999 PR PBB SHADOW E&M-EST. PATIENT-LVL IV: ICD-10-PCS | Mod: PBBFAC,HCNC,, | Performed by: PODIATRIST

## 2020-08-11 PROCEDURE — 11721 PR DEBRIDEMENT OF NAILS, 6 OR MORE: ICD-10-PCS | Mod: Q9,HCNC,S$GLB, | Performed by: PODIATRIST

## 2020-08-11 PROCEDURE — 11721 DEBRIDE NAIL 6 OR MORE: CPT | Mod: Q9,HCNC,S$GLB, | Performed by: PODIATRIST

## 2020-08-11 PROCEDURE — 99499 UNLISTED E&M SERVICE: CPT | Mod: HCNC,S$GLB,, | Performed by: PODIATRIST

## 2020-08-11 NOTE — PROGRESS NOTES
Subjective:      Patient ID: Samuel Cast is a 64 y.o. male.    Chief Complaint: Diabetes Mellitus (PCP:  Dr Geller 8/5/20; HgbA1c:  8/5/20  5.3) and Nail Care    Samuel is a 64 y.o. male who presents to the clinic upon referral from Dr. Altamirano ref. provider found  for evaluation and treatment of diabetic feet. Samuel has a past medical history of Diabetes mellitus, type 2, Hypertension, and Stroke. Patient relates no major problem with feet. Only complaints today consist of concern over long thick nails difficult to cut, high risk care with DM II and associated complications    PCP: Nallely Jurado MD    Date Last Seen by PCP: 10/31/20    Current shoe gear: Casual shoes    Hemoglobin A1C   Date Value Ref Range Status   08/05/2020 5.3 4.0 - 5.6 % Final     Comment:     ADA Screening Guidelines:  5.7-6.4%  Consistent with prediabetes  >or=6.5%  Consistent with diabetes  High levels of fetal hemoglobin interfere with the HbA1C  assay. Heterozygous hemoglobin variants (HbS, HgC, etc)do  not significantly interfere with this assay.   However, presence of multiple variants may affect accuracy.     11/02/2019 5.9 (H) 4.0 - 5.6 % Final     Comment:     ADA Screening Guidelines:  5.7-6.4%  Consistent with prediabetes  >or=6.5%  Consistent with diabetes  High levels of fetal hemoglobin interfere with the HbA1C  assay. Heterozygous hemoglobin variants (HbS, HgC, etc)do  not significantly interfere with this assay.   However, presence of multiple variants may affect accuracy.             Review of Systems   Constitution: Negative for chills and fever.   Cardiovascular: Positive for leg swelling. Negative for claudication.   Respiratory: Negative for shortness of breath.    Skin: Positive for nail changes. Negative for itching and rash.   Musculoskeletal: Negative for muscle cramps, muscle weakness and myalgias.   Gastrointestinal: Negative for nausea and vomiting.   Neurological: Negative for focal weakness, loss of balance,  numbness and paresthesias.           Objective:      Physical Exam  Constitutional:       General: He is not in acute distress.     Appearance: He is well-developed. He is not diaphoretic.   Cardiovascular:      Pulses:           Dorsalis pedis pulses are 2+ on the right side and 2+ on the left side.        Posterior tibial pulses are 2+ on the right side and 2+ on the left side.      Comments: < 3 sec capillary refill time to toes 1-5 bilateral. Toes and feet are warm to touch proximally with normal distal cooling b/l. There is no hair growth on the feet and toes b/l. There is edema b/l. No spider veins or varicosities present b/l.     Musculoskeletal:      Comments: Equinus noted b/l ankles with < 10 deg DF noted. Right sided muscle weakness to PF, DF, eversion and inversion 3-4/5. Passive range of motion of ankle and pedal joints is painless b/l.     Skin:     General: Skin is warm and dry.      Coloration: Skin is not pale.      Findings: No abrasion, bruising, burn, ecchymosis, erythema, laceration, lesion, petechiae or rash.      Nails: There is no clubbing.        Comments: Skin is thin and atrophic bilateral    Nails 1-5 bilateral are thick 3-4 mm, long 3-6 mm, and discolored with subungual debris       Neurological:      Mental Status: He is alert and oriented to person, place, and time.      Sensory: No sensory deficit.      Motor: No tremor, atrophy or abnormal muscle tone.      Comments: Negative tinel sign bilateral.   Psychiatric:         Behavior: Behavior normal.               Assessment:       Encounter Diagnoses   Name Primary?    Type 2 diabetes mellitus without complication, without long-term current use of insulin Yes    Onycholysis of toenail     Type II diabetes mellitus with peripheral circulatory disorder     Hemiparesis following cerebrovascular accident (CVA)          Plan:       Samuel was seen today for diabetes mellitus and nail care.    Diagnoses and all orders for this  visit:    Type 2 diabetes mellitus without complication, without long-term current use of insulin    Onycholysis of toenail    Type II diabetes mellitus with peripheral circulatory disorder    Hemiparesis following cerebrovascular accident (CVA)      I counseled the patient on his conditions, their implications and medical management.    Shoe inspection. Diabetic Foot Education. Patient reminded of the importance of good nutrition and blood sugar control to help prevent podiatric complications of diabetes. Patient instructed on proper foot hygeine. We discussed wearing proper shoe gear, daily foot inspections, never walking without protective shoe gear, never putting sharp instruments to feet     - With patient's permission, nails were aggressively reduced and debrided x 10 to their soft tissue attachment mechanically and with electric , removing all offending nail and debris. Patient relates relief following the procedure. She will continue to monitor the areas daily, inspect her feet, wear protective shoe gear when ambulatory, moisturizer to maintain skin integrity.    - Return to clinic in 4 months routine care or sooner if problems arise     Nikita Martínez DPM

## 2020-08-25 ENCOUNTER — PATIENT OUTREACH (OUTPATIENT)
Dept: OTHER | Facility: OTHER | Age: 65
End: 2020-08-25

## 2020-08-25 DIAGNOSIS — E11.9 TYPE 2 DIABETES MELLITUS WITHOUT COMPLICATION, WITHOUT LONG-TERM CURRENT USE OF INSULIN: Primary | ICD-10-CM

## 2020-08-25 NOTE — PROGRESS NOTES
Digital Medicine: Clinician Introduction    Samuel Cast is a 64 y.o. male who is newly enrolled in the Digital Medicine Clinic.    The history is provided by the spouse/significant other.      Review of patient's allergies indicates:  No Known Allergies  Completed Medication Reconciliation  Verified pharmacy information.  Patient is not on ACEI/ARB because   Patient is on statin     HYPERTENSION  Explained hypertension digital medicine goals including BP goal less than or equal to 130/80mmHg, improved convenience of BP management and reduced risk of heart attack, kidney failure, stroke, eye disease, dementia, and death.     Explained non-pharmacologic therapies like low salt diet and physical activity can reduce blood pressure.       Explained that we expect patient to submit several blood pressure readings per week at random times of the day, but at least 30 minutes after taking blood pressure medications. Instructed patient not to allow anyone else to use their blood pressure monitor and phone as data submitted is directly entered into medical record. Reviewed and confirmed appropriate blood pressure monitoring technique.         Reviewed signs/symptoms of hypertension (headache, changes in vision, chest pain, shortness of breath)   Reviewed signs/symptoms of hypotension (lightheaded, dizziness, weakness)     Patient's BP goal is less than or equal to 130/80. Patients BP average is 145/90 mmHg, which is above goal, per 2017 ACC/AHA Hypertension Guidelines.     DIABETES  Explained the goal of the diabetes digital medicine program is to decrease A1c within patient-specific target levels. Reviewed benefits of A1c reduction including reducing risk for kidney, eye, and nerve disease.           Patient reported SMBG schedule: Daily.   Reviewed general Self-Monitoring of Blood Glucose (SMBG) goals:  · FP-130 mg/dL  · 2h PPG: <180 mg/dL  · Bedtime: <150 mg/dL    Reviewed signs or symptoms of hyperglycemia  (headache, increased thirst, increased urination, fatigue, blurred vision, etc.).      Reviewed signs and symptoms of hypoglycemia (weakness, dizziness, hunger, shakiness, nausea, headache, heart palpitations, sweating, fatigue, anxiety, etc.).  Reviewed treatment of hypoglycemia (15/15 rule).      Patient does not have history of hypoglycemia.    Patient's A1C goal is less than or equal to 8 per 2020 ADA guidelines. Patient's most recent A1C result is at goal. Lab Results     Component                Value               Date                     HGBA1C                   5.3                 08/05/2020          .       Last 5 Patient Entered Readings                                      Current 30 Day Average: 145/90     Recent Readings 8/25/2020 8/25/2020 8/2/2020 7/10/2020 7/6/2020    SBP (mmHg) 147 139 142 147 157    DBP (mmHg) 93 80 96 93 80    Pulse 64 69 67 64 64        Last 6 Patient Entered Readings                                          Most Recent A1c: 5.3% on 8/5/2020  (Goal: 8%)     Recent Readings 8/2/2020 7/6/2020 7/2/2020 6/16/2020 6/15/2020    Blood Glucose (mg/dL) 167 214 143 116 134            Depression Screening  Did not address depression screening.    Sleep Apnea Screening    Did not address sleep apnea screening.     Medication Affordability Screening  Did not address medication affordability screening.           ASSESSMENT(S)  Patients BP average is 145/90 mmHg, which is above goal. Patient's BP goal is less than or equal to 130/80 per 2017 ACC/AHA Hypertension Guidelines.       Hypertension Plan  Continue current therapy.  Wife to get 2-3 readings over the next few weeks  Diabetes Plan  Continue current therapy.       Addressed any questions or concerns and patient has my contact information if needed prior to next outreach. Patient verbalizes understanding.      Explained the importance of self-monitoring and medication adherence. Encouraged the patient to communicate with their health   for lifestyle modifications to help improve or maintain a healthy lifestyle.        Sent link to [facility] webpages and my contact information via [employer] for future questions.        Explained to the patient that the Digital Medicine team is not available for emergencies. Advised patient call [facility] ([phone] or [phone]) or 911 if needed.         There are no preventive care reminders to display for this patient.    Current Medication Regimen:  Hypertension Medications             metoprolol tartrate (LOPRESSOR) 50 MG tablet Take 2 tablets (100 mg total) by mouth 2 (two) times daily.        Diabetes Medications             glimepiride (AMARYL) 4 MG tablet Take 1 tablet (4 mg total) by mouth before breakfast.    metFORMIN (GLUCOPHAGE) 500 MG tablet Take 1 tablet (500 mg total) by mouth 3 (three) times daily with meals.

## 2020-09-16 NOTE — PROGRESS NOTES
Digital Medicine: Health  Follow-Up    The history is provided by the spouse/significant other.               Care Team received low BG alert.      Patient needs assistance troubleshooting: Old Phone.    Additional Follow-up details: I called Mr. Baker to follow up on a low BG alert of 56 from 8/31. Patient's spouse does not report remembering what happened that day.     Patient's spouse reports that she is getting a new phone this week, and she hopes the readings will sync over in real time. She checked Mr. Baker's blood sugar today and it was 122.           Diet-Not assessed          Physical Activity-Not assessed    Medication Adherence-Medication Adherence not addressed.      Substance, Sleep, Stress-Not assessed      Additional monitoring needed.  Provided patient education.       Addressed patient questions and patient has my contact information if needed prior to next outreach. Patient verbalizes understanding.          There are no preventive care reminders to display for this patient.    Last 5 Patient Entered Readings                                      Current 30 Day Average: 149/86     Recent Readings 9/16/2020 8/31/2020 8/26/2020 8/25/2020 8/25/2020    SBP (mmHg) 155 167 125 147 139    DBP (mmHg) 83 102 73 93 80    Pulse 65 66 61 64 69        Last 6 Patient Entered Readings                                          Most Recent A1c: 5.3% on 8/5/2020  (Goal: 8%)     Recent Readings 8/31/2020 8/26/2020 8/25/2020 8/2/2020 7/6/2020    Blood Glucose (mg/dL) 56 184 167 167 214

## 2020-09-25 ENCOUNTER — PATIENT OUTREACH (OUTPATIENT)
Dept: OTHER | Facility: OTHER | Age: 65
End: 2020-09-25

## 2020-10-08 ENCOUNTER — PATIENT MESSAGE (OUTPATIENT)
Dept: FAMILY MEDICINE | Facility: CLINIC | Age: 65
End: 2020-10-08

## 2020-10-08 NOTE — TELEPHONE ENCOUNTER
Encounter details (provider/department) have been updated by [facility] staff.   Of note, HF details may not display.     As of this time CDM: Does not populate or display     Updated: n/a information is pended correctly    Of note, CDM will not display. PCP not live with [facility].    Will resend refill request encounter to  Centralized Refill Staff Pool.     [facility]     Note composed:4:18 PM 10/08/2020

## 2020-10-08 NOTE — TELEPHONE ENCOUNTER
Refill Routing Note   Medication(s) are not appropriate for processing by [facility] for the following reason(s):     - Non-participating provider    [facility] actions taken in this encounter: Route          Medication reconciliation completed: No   Automatic Epic Generated Protocol Data:        Requested Prescriptions   Pending Prescriptions Disp Refills    metFORMIN (GLUCOPHAGE) 500 MG tablet 270 tablet 0     Sig: Take 1 tablet (500 mg total) by mouth 3 (three) times daily with meals.       Antihyperglycemics Protocol Passed - 10/8/2020  4:18 PM        Passed - Recent visit in the past year        Passed - Normal creatinine in past 6 months     Lab Results   Component Value Date    CREATININE 0.8 08/05/2020              Passed - Had HBA1C in past 6 months     Hemoglobin A1C   Date Value Ref Range Status   08/05/2020 5.3 4.0 - 5.6 % Final     Comment:     ADA Screening Guidelines:  5.7-6.4%  Consistent with prediabetes  >or=6.5%  Consistent with diabetes  High levels of fetal hemoglobin interfere with the HbA1C  assay. Heterozygous hemoglobin variants (HbS, HgC, etc)do  not significantly interfere with this assay.   However, presence of multiple variants may affect accuracy.     11/02/2019 5.9 (H) 4.0 - 5.6 % Final     Comment:     ADA Screening Guidelines:  5.7-6.4%  Consistent with prediabetes  >or=6.5%  Consistent with diabetes  High levels of fetal hemoglobin interfere with the HbA1C  assay. Heterozygous hemoglobin variants (HbS, HgC, etc)do  not significantly interfere with this assay.   However, presence of multiple variants may affect accuracy.                     Appointments  past 12m or future 3m with PCP    Date Provider   Last Visit   2/4/2020 Nallely Jurado MD   Next Visit   11/5/2020 Nallely Jurado MD   ED visits in past 90 days: [unfilled]        Note composed:5:46 PM 10/08/2020

## 2020-10-11 RX ORDER — METFORMIN HYDROCHLORIDE 500 MG/1
500 TABLET ORAL
Qty: 270 TABLET | Refills: 0 | Status: SHIPPED | OUTPATIENT
Start: 2020-10-11 | End: 2020-12-08

## 2020-10-14 ENCOUNTER — PATIENT OUTREACH (OUTPATIENT)
Dept: OTHER | Facility: OTHER | Age: 65
End: 2020-10-14

## 2020-10-14 NOTE — PROGRESS NOTES
Digital Medicine: Health  Follow-Up    The history is provided by the spouse/significant other.                 Patient needs assistance troubleshooting: patient reminder needed.      Topics Covered on Call: physical activity    Additional Follow-up details: I spoke to patient's wife about Mr. Baker. She has not been checking his BP with digital cuff because he is frequently getting his BP checked while at physical therapy two times per week.     She notes she will be better about checking his BP and BG. I encouraged her to aim for one reading per week due to time.             Diet-no change to diet    No change to diet.        Physical Activity-no change to routine  No change to exercise routine.       Additional physical activity details: Continues to go to physical therapy.       Medication Adherence-Medication adherence was assessed.          Additional monitoring needed.  Instructed to charge device.       Addressed patient questions and patient has my contact information if needed prior to next outreach. Patient verbalizes understanding.             There are no preventive care reminders to display for this patient.      Last 5 Patient Entered Readings                                      Current 30 Day Average: 155/83     Recent Readings 9/16/2020 8/31/2020 8/26/2020 8/25/2020 8/25/2020    SBP (mmHg) 155 167 125 147 139    DBP (mmHg) 83 102 73 93 80    Pulse 65 66 61 64 69        Last 6 Patient Entered Readings                                          Most Recent A1c: 5.3% on 8/5/2020  (Goal: 8%)     Recent Readings 8/31/2020 8/26/2020 8/25/2020 8/2/2020 7/6/2020    Blood Glucose (mg/dL) 56 184 167 167 214

## 2020-11-05 ENCOUNTER — OFFICE VISIT (OUTPATIENT)
Dept: FAMILY MEDICINE | Facility: CLINIC | Age: 65
End: 2020-11-05
Payer: MEDICARE

## 2020-11-05 VITALS
HEIGHT: 76 IN | DIASTOLIC BLOOD PRESSURE: 86 MMHG | WEIGHT: 274.06 LBS | SYSTOLIC BLOOD PRESSURE: 150 MMHG | HEART RATE: 64 BPM | OXYGEN SATURATION: 96 % | BODY MASS INDEX: 33.37 KG/M2

## 2020-11-05 DIAGNOSIS — Z86.73 HISTORY OF CARDIOEMBOLIC CEREBROVASCULAR ACCIDENT (CVA): ICD-10-CM

## 2020-11-05 DIAGNOSIS — I69.359 HEMIPARESIS FOLLOWING CEREBROVASCULAR ACCIDENT (CVA): ICD-10-CM

## 2020-11-05 DIAGNOSIS — R53.83 FATIGUE, UNSPECIFIED TYPE: ICD-10-CM

## 2020-11-05 DIAGNOSIS — E11.9 TYPE 2 DIABETES MELLITUS WITHOUT COMPLICATION, WITHOUT LONG-TERM CURRENT USE OF INSULIN: ICD-10-CM

## 2020-11-05 DIAGNOSIS — I10 ESSENTIAL HYPERTENSION: ICD-10-CM

## 2020-11-05 DIAGNOSIS — Z00.00 ANNUAL PHYSICAL EXAM: Primary | ICD-10-CM

## 2020-11-05 PROCEDURE — 3079F DIAST BP 80-89 MM HG: CPT | Mod: HCNC,CPTII,S$GLB, | Performed by: INTERNAL MEDICINE

## 2020-11-05 PROCEDURE — 99999 PR PBB SHADOW E&M-EST. PATIENT-LVL IV: ICD-10-PCS | Mod: PBBFAC,HCNC,, | Performed by: INTERNAL MEDICINE

## 2020-11-05 PROCEDURE — 3077F SYST BP >= 140 MM HG: CPT | Mod: HCNC,CPTII,S$GLB, | Performed by: INTERNAL MEDICINE

## 2020-11-05 PROCEDURE — 3008F BODY MASS INDEX DOCD: CPT | Mod: HCNC,CPTII,S$GLB, | Performed by: INTERNAL MEDICINE

## 2020-11-05 PROCEDURE — 99214 OFFICE O/P EST MOD 30 MIN: CPT | Mod: HCNC,25,S$GLB, | Performed by: INTERNAL MEDICINE

## 2020-11-05 PROCEDURE — 90686 IIV4 VACC NO PRSV 0.5 ML IM: CPT | Mod: HCNC,S$GLB,, | Performed by: INTERNAL MEDICINE

## 2020-11-05 PROCEDURE — 3044F HG A1C LEVEL LT 7.0%: CPT | Mod: HCNC,CPTII,S$GLB, | Performed by: INTERNAL MEDICINE

## 2020-11-05 PROCEDURE — 90686 FLU VACCINE (QUAD) GREATER THAN OR EQUAL TO 3YO PRESERVATIVE FREE IM: ICD-10-PCS | Mod: HCNC,S$GLB,, | Performed by: INTERNAL MEDICINE

## 2020-11-05 PROCEDURE — G0008 FLU VACCINE (QUAD) GREATER THAN OR EQUAL TO 3YO PRESERVATIVE FREE IM: ICD-10-PCS | Mod: HCNC,S$GLB,, | Performed by: INTERNAL MEDICINE

## 2020-11-05 PROCEDURE — 3077F PR MOST RECENT SYSTOLIC BLOOD PRESSURE >= 140 MM HG: ICD-10-PCS | Mod: HCNC,CPTII,S$GLB, | Performed by: INTERNAL MEDICINE

## 2020-11-05 PROCEDURE — 3079F PR MOST RECENT DIASTOLIC BLOOD PRESSURE 80-89 MM HG: ICD-10-PCS | Mod: HCNC,CPTII,S$GLB, | Performed by: INTERNAL MEDICINE

## 2020-11-05 PROCEDURE — 3008F PR BODY MASS INDEX (BMI) DOCUMENTED: ICD-10-PCS | Mod: HCNC,CPTII,S$GLB, | Performed by: INTERNAL MEDICINE

## 2020-11-05 PROCEDURE — 99999 PR PBB SHADOW E&M-EST. PATIENT-LVL IV: CPT | Mod: PBBFAC,HCNC,, | Performed by: INTERNAL MEDICINE

## 2020-11-05 PROCEDURE — G0008 ADMIN INFLUENZA VIRUS VAC: HCPCS | Mod: HCNC,S$GLB,, | Performed by: INTERNAL MEDICINE

## 2020-11-05 PROCEDURE — 3044F PR MOST RECENT HEMOGLOBIN A1C LEVEL <7.0%: ICD-10-PCS | Mod: HCNC,CPTII,S$GLB, | Performed by: INTERNAL MEDICINE

## 2020-11-05 PROCEDURE — 99214 PR OFFICE/OUTPT VISIT, EST, LEVL IV, 30-39 MIN: ICD-10-PCS | Mod: HCNC,25,S$GLB, | Performed by: INTERNAL MEDICINE

## 2020-11-05 RX ORDER — PANTOPRAZOLE SODIUM 20 MG/1
20 TABLET, DELAYED RELEASE ORAL DAILY
Qty: 90 TABLET | Refills: 3 | Status: SHIPPED | OUTPATIENT
Start: 2020-11-05 | End: 2021-08-25

## 2020-11-05 RX ORDER — LOSARTAN POTASSIUM 50 MG/1
50 TABLET ORAL DAILY
Qty: 90 TABLET | Refills: 3 | Status: SHIPPED | OUTPATIENT
Start: 2020-11-05 | End: 2021-04-22

## 2020-11-05 NOTE — PROGRESS NOTES
Subjective:       Patient ID: Samuel Cast is a 65 y.o. male.    Chief Complaint: Hypertension    Pt here for 3 month follow up. He has the following conditinos:    HTN- has been running a little high at home  DM- not checking blood sugar. Last a1c 5.3. utd eye exam and foot exam. On crestor.   Insomnia- stable on trazodone  Hx cva:  Currently getting PT twice a week. Can walk now with cane.     Review of Systems   Constitutional: Negative for fatigue, fever and unexpected weight change.   HENT: Negative for nasal congestion, postnasal drip and sore throat.    Eyes: Negative for visual disturbance.   Respiratory: Negative for cough, chest tightness, shortness of breath and wheezing.    Cardiovascular: Negative for chest pain, palpitations and leg swelling.   Gastrointestinal: Negative for abdominal pain, blood in stool, constipation, diarrhea, nausea and vomiting.   Endocrine: Negative for cold intolerance and heat intolerance.   Genitourinary: Negative for difficulty urinating, dysuria and frequency.   Musculoskeletal: Negative for back pain, joint swelling and myalgias.   Integumentary:  Negative for rash.   Allergic/Immunologic: Negative for environmental allergies.   Neurological: Negative for dizziness, seizures, numbness and headaches.   Hematological: Does not bruise/bleed easily.   Psychiatric/Behavioral: Negative for agitation and sleep disturbance.         Objective:      Physical Exam  Constitutional:       Appearance: He is well-developed.   HENT:      Head: Normocephalic and atraumatic.   Eyes:      Conjunctiva/sclera: Conjunctivae normal.      Pupils: Pupils are equal, round, and reactive to light.   Neck:      Musculoskeletal: Normal range of motion and neck supple.      Thyroid: No thyromegaly.   Cardiovascular:      Rate and Rhythm: Normal rate and regular rhythm.      Heart sounds: Normal heart sounds. No murmur. No friction rub. No gallop.    Pulmonary:      Effort: Pulmonary effort is normal. No  respiratory distress.      Breath sounds: Normal breath sounds. No wheezing or rales.   Abdominal:      General: Bowel sounds are normal. There is no distension.      Palpations: Abdomen is soft.      Tenderness: There is no abdominal tenderness.   Musculoskeletal: Normal range of motion.   Lymphadenopathy:      Cervical: No cervical adenopathy.   Skin:     General: Skin is warm and dry.   Neurological:      Mental Status: He is alert and oriented to person, place, and time.      Cranial Nerves: No cranial nerve deficit.   Psychiatric:         Behavior: Behavior normal.         Assessment:       1. Annual physical exam    2. Type 2 diabetes mellitus without complication, without long-term current use of insulin    3. Fatigue, unspecified type    4. Essential hypertension    5. History of cardioembolic cerebrovascular accident (CVA)    6. Hemiparesis following cerebrovascular accident (CVA)        Plan:       htn- add losartan 50. Pt getting blood pressure checked by PT twice a week  DM- on amaryl 4 and metofmrin. Check a1c. If still low will decrease amaryl  cva- strenght improving, undergoing pt. On sta tin and asa    Flu shto today

## 2020-11-13 ENCOUNTER — PATIENT MESSAGE (OUTPATIENT)
Dept: ADMINISTRATIVE | Facility: HOSPITAL | Age: 65
End: 2020-11-13

## 2020-11-18 ENCOUNTER — PATIENT MESSAGE (OUTPATIENT)
Dept: OTHER | Facility: OTHER | Age: 65
End: 2020-11-18

## 2020-12-01 ENCOUNTER — PATIENT MESSAGE (OUTPATIENT)
Dept: OTHER | Facility: OTHER | Age: 65
End: 2020-12-01

## 2020-12-09 ENCOUNTER — PATIENT MESSAGE (OUTPATIENT)
Dept: PODIATRY | Facility: CLINIC | Age: 65
End: 2020-12-09

## 2020-12-16 ENCOUNTER — PATIENT OUTREACH (OUTPATIENT)
Dept: OTHER | Facility: OTHER | Age: 65
End: 2020-12-16

## 2020-12-16 NOTE — PROGRESS NOTES
Digital Medicine: Health  Follow-Up    The history is provided by the spouse/significant other.                   Additional Follow-up details: Patient's wife reports she would like to be put on hiatus as she recovers from a cold. She helps Mr. Baker check his readings.             Diet-Not assessed          Physical Activity-Not assessed    Medication Adherence-Medication Adherence not addressed.      Substance, Sleep, Stress-Not assessed              Topic    Lipid (Cholesterol) Test          Last 5 Patient Entered Readings                                      Current 30 Day Average:      Recent Readings 11/13/2020 9/16/2020 8/31/2020 8/26/2020 8/25/2020    SBP (mmHg) 137 155 167 125 147    DBP (mmHg) 90 83 102 73 93    Pulse 63 65 66 61 64        Last 6 Patient Entered Readings                                          Most Recent A1c: 5.3% on 8/5/2020  (Goal: 8%)     Recent Readings 8/31/2020 8/26/2020 8/25/2020 8/2/2020 7/6/2020    Blood Glucose (mg/dL) 56 184 167 167 214

## 2020-12-21 ENCOUNTER — PATIENT MESSAGE (OUTPATIENT)
Dept: FAMILY MEDICINE | Facility: CLINIC | Age: 65
End: 2020-12-21

## 2020-12-22 RX ORDER — LORAZEPAM 0.5 MG/1
0.5 TABLET ORAL EVERY 8 HOURS PRN
Qty: 30 TABLET | Refills: 0 | Status: SHIPPED | OUTPATIENT
Start: 2020-12-22 | End: 2021-03-08 | Stop reason: SDUPTHER

## 2021-02-01 ENCOUNTER — LAB VISIT (OUTPATIENT)
Dept: LAB | Facility: HOSPITAL | Age: 66
End: 2021-02-01
Attending: INTERNAL MEDICINE
Payer: MEDICARE

## 2021-02-01 DIAGNOSIS — R53.83 FATIGUE, UNSPECIFIED TYPE: ICD-10-CM

## 2021-02-01 DIAGNOSIS — E11.9 TYPE 2 DIABETES MELLITUS WITHOUT COMPLICATION, WITHOUT LONG-TERM CURRENT USE OF INSULIN: ICD-10-CM

## 2021-02-01 LAB
ALBUMIN SERPL BCP-MCNC: 3.8 G/DL (ref 3.5–5.2)
ALP SERPL-CCNC: 78 U/L (ref 55–135)
ALT SERPL W/O P-5'-P-CCNC: 31 U/L (ref 10–44)
ANION GAP SERPL CALC-SCNC: 11 MMOL/L (ref 8–16)
AST SERPL-CCNC: 21 U/L (ref 10–40)
BASOPHILS # BLD AUTO: 0.04 K/UL (ref 0–0.2)
BASOPHILS NFR BLD: 0.8 % (ref 0–1.9)
BILIRUB SERPL-MCNC: 0.4 MG/DL (ref 0.1–1)
BUN SERPL-MCNC: 18 MG/DL (ref 8–23)
CALCIUM SERPL-MCNC: 9.1 MG/DL (ref 8.7–10.5)
CHLORIDE SERPL-SCNC: 106 MMOL/L (ref 95–110)
CHOLEST SERPL-MCNC: 161 MG/DL (ref 120–199)
CHOLEST/HDLC SERPL: 5 {RATIO} (ref 2–5)
CO2 SERPL-SCNC: 26 MMOL/L (ref 23–29)
CREAT SERPL-MCNC: 0.8 MG/DL (ref 0.5–1.4)
DIFFERENTIAL METHOD: ABNORMAL
EOSINOPHIL # BLD AUTO: 0.2 K/UL (ref 0–0.5)
EOSINOPHIL NFR BLD: 4.4 % (ref 0–8)
ERYTHROCYTE [DISTWIDTH] IN BLOOD BY AUTOMATED COUNT: 12.4 % (ref 11.5–14.5)
EST. GFR  (AFRICAN AMERICAN): >60 ML/MIN/1.73 M^2
EST. GFR  (NON AFRICAN AMERICAN): >60 ML/MIN/1.73 M^2
ESTIMATED AVG GLUCOSE: 111 MG/DL (ref 68–131)
GLUCOSE SERPL-MCNC: 153 MG/DL (ref 70–110)
HBA1C MFR BLD: 5.5 % (ref 4–5.6)
HCT VFR BLD AUTO: 46.5 % (ref 40–54)
HDLC SERPL-MCNC: 32 MG/DL (ref 40–75)
HDLC SERPL: 19.9 % (ref 20–50)
HGB BLD-MCNC: 15.5 G/DL (ref 14–18)
IMM GRANULOCYTES # BLD AUTO: 0.02 K/UL (ref 0–0.04)
IMM GRANULOCYTES NFR BLD AUTO: 0.4 % (ref 0–0.5)
LDLC SERPL CALC-MCNC: 74.2 MG/DL (ref 63–159)
LYMPHOCYTES # BLD AUTO: 1.4 K/UL (ref 1–4.8)
LYMPHOCYTES NFR BLD: 27.1 % (ref 18–48)
MCH RBC QN AUTO: 32 PG (ref 27–31)
MCHC RBC AUTO-ENTMCNC: 33.3 G/DL (ref 32–36)
MCV RBC AUTO: 96 FL (ref 82–98)
MONOCYTES # BLD AUTO: 0.4 K/UL (ref 0.3–1)
MONOCYTES NFR BLD: 8.3 % (ref 4–15)
NEUTROPHILS # BLD AUTO: 3.1 K/UL (ref 1.8–7.7)
NEUTROPHILS NFR BLD: 59 % (ref 38–73)
NONHDLC SERPL-MCNC: 129 MG/DL
NRBC BLD-RTO: 0 /100 WBC
PLATELET # BLD AUTO: 171 K/UL (ref 150–350)
PMV BLD AUTO: 10.5 FL (ref 9.2–12.9)
POTASSIUM SERPL-SCNC: 4.1 MMOL/L (ref 3.5–5.1)
PROT SERPL-MCNC: 7.3 G/DL (ref 6–8.4)
RBC # BLD AUTO: 4.85 M/UL (ref 4.6–6.2)
SODIUM SERPL-SCNC: 143 MMOL/L (ref 136–145)
TRIGL SERPL-MCNC: 274 MG/DL (ref 30–150)
TSH SERPL DL<=0.005 MIU/L-ACNC: 2.82 UIU/ML (ref 0.4–4)
WBC # BLD AUTO: 5.27 K/UL (ref 3.9–12.7)

## 2021-02-01 PROCEDURE — 80053 COMPREHEN METABOLIC PANEL: CPT

## 2021-02-01 PROCEDURE — 84443 ASSAY THYROID STIM HORMONE: CPT

## 2021-02-01 PROCEDURE — 36415 COLL VENOUS BLD VENIPUNCTURE: CPT | Mod: PO

## 2021-02-01 PROCEDURE — 80061 LIPID PANEL: CPT

## 2021-02-01 PROCEDURE — 85025 COMPLETE CBC W/AUTO DIFF WBC: CPT

## 2021-02-01 PROCEDURE — 83036 HEMOGLOBIN GLYCOSYLATED A1C: CPT

## 2021-03-08 ENCOUNTER — PATIENT MESSAGE (OUTPATIENT)
Dept: FAMILY MEDICINE | Facility: CLINIC | Age: 66
End: 2021-03-08

## 2021-03-09 RX ORDER — LORAZEPAM 0.5 MG/1
0.5 TABLET ORAL EVERY 8 HOURS PRN
Qty: 30 TABLET | Refills: 0 | Status: SHIPPED | OUTPATIENT
Start: 2021-03-09 | End: 2021-04-16 | Stop reason: SDUPTHER

## 2021-03-17 ENCOUNTER — PATIENT MESSAGE (OUTPATIENT)
Dept: ADMINISTRATIVE | Facility: OTHER | Age: 66
End: 2021-03-17

## 2021-03-21 ENCOUNTER — PATIENT MESSAGE (OUTPATIENT)
Dept: ADMINISTRATIVE | Facility: OTHER | Age: 66
End: 2021-03-21

## 2021-04-05 ENCOUNTER — PATIENT MESSAGE (OUTPATIENT)
Dept: FAMILY MEDICINE | Facility: CLINIC | Age: 66
End: 2021-04-05

## 2021-04-05 ENCOUNTER — IMMUNIZATION (OUTPATIENT)
Dept: FAMILY MEDICINE | Facility: CLINIC | Age: 66
End: 2021-04-05
Payer: MEDICARE

## 2021-04-05 DIAGNOSIS — Z23 NEED FOR VACCINATION: Primary | ICD-10-CM

## 2021-04-05 PROCEDURE — 91300 COVID-19, MRNA, LNP-S, PF, 30 MCG/0.3 ML DOSE VACCINE: CPT | Mod: HCNC,PBBFAC | Performed by: FAMILY MEDICINE

## 2021-04-05 RX ORDER — PHENYTOIN SODIUM 100 MG/1
400 CAPSULE, EXTENDED RELEASE ORAL DAILY
Qty: 360 CAPSULE | Refills: 3 | Status: SHIPPED | OUTPATIENT
Start: 2021-04-07 | End: 2022-03-02 | Stop reason: SDUPTHER

## 2021-04-12 RX ORDER — METFORMIN HYDROCHLORIDE 500 MG/1
500 TABLET ORAL
Qty: 270 TABLET | Refills: 0 | Status: SHIPPED | OUTPATIENT
Start: 2021-04-12 | End: 2021-06-21

## 2021-04-26 ENCOUNTER — IMMUNIZATION (OUTPATIENT)
Dept: FAMILY MEDICINE | Facility: CLINIC | Age: 66
End: 2021-04-26
Payer: MEDICARE

## 2021-04-26 DIAGNOSIS — Z23 NEED FOR VACCINATION: Primary | ICD-10-CM

## 2021-04-26 PROCEDURE — 0002A COVID-19, MRNA, LNP-S, PF, 30 MCG/0.3 ML DOSE VACCINE: CPT | Mod: HCNC,PBBFAC | Performed by: INTERNAL MEDICINE

## 2021-04-26 PROCEDURE — 91300 COVID-19, MRNA, LNP-S, PF, 30 MCG/0.3 ML DOSE VACCINE: CPT | Mod: HCNC,PBBFAC | Performed by: INTERNAL MEDICINE

## 2021-04-27 ENCOUNTER — OFFICE VISIT (OUTPATIENT)
Dept: FAMILY MEDICINE | Facility: CLINIC | Age: 66
End: 2021-04-27
Payer: MEDICARE

## 2021-04-27 VITALS
SYSTOLIC BLOOD PRESSURE: 130 MMHG | OXYGEN SATURATION: 96 % | HEIGHT: 76 IN | BODY MASS INDEX: 33.36 KG/M2 | DIASTOLIC BLOOD PRESSURE: 82 MMHG | HEART RATE: 66 BPM

## 2021-04-27 DIAGNOSIS — I69.359 HEMIPARESIS FOLLOWING CEREBROVASCULAR ACCIDENT (CVA): ICD-10-CM

## 2021-04-27 DIAGNOSIS — I10 ESSENTIAL HYPERTENSION: Primary | ICD-10-CM

## 2021-04-27 DIAGNOSIS — E11.51 TYPE II DIABETES MELLITUS WITH PERIPHERAL CIRCULATORY DISORDER: ICD-10-CM

## 2021-04-27 PROCEDURE — 99214 OFFICE O/P EST MOD 30 MIN: CPT | Mod: S$GLB,,, | Performed by: INTERNAL MEDICINE

## 2021-04-27 PROCEDURE — 1126F AMNT PAIN NOTED NONE PRSNT: CPT | Mod: S$GLB,,, | Performed by: INTERNAL MEDICINE

## 2021-04-27 PROCEDURE — 99999 PR PBB SHADOW E&M-EST. PATIENT-LVL IV: ICD-10-PCS | Mod: PBBFAC,,, | Performed by: INTERNAL MEDICINE

## 2021-04-27 PROCEDURE — 99999 PR PBB SHADOW E&M-EST. PATIENT-LVL IV: CPT | Mod: PBBFAC,,, | Performed by: INTERNAL MEDICINE

## 2021-04-27 PROCEDURE — 3008F BODY MASS INDEX DOCD: CPT | Mod: CPTII,S$GLB,, | Performed by: INTERNAL MEDICINE

## 2021-04-27 PROCEDURE — 3008F PR BODY MASS INDEX (BMI) DOCUMENTED: ICD-10-PCS | Mod: CPTII,S$GLB,, | Performed by: INTERNAL MEDICINE

## 2021-04-27 PROCEDURE — 99499 RISK ADDL DX/OHS AUDIT: ICD-10-PCS | Mod: S$GLB,,, | Performed by: INTERNAL MEDICINE

## 2021-04-27 PROCEDURE — 1126F PR PAIN SEVERITY QUANTIFIED, NO PAIN PRESENT: ICD-10-PCS | Mod: S$GLB,,, | Performed by: INTERNAL MEDICINE

## 2021-04-27 PROCEDURE — 3044F PR MOST RECENT HEMOGLOBIN A1C LEVEL <7.0%: ICD-10-PCS | Mod: CPTII,S$GLB,, | Performed by: INTERNAL MEDICINE

## 2021-04-27 PROCEDURE — 3044F HG A1C LEVEL LT 7.0%: CPT | Mod: CPTII,S$GLB,, | Performed by: INTERNAL MEDICINE

## 2021-04-27 PROCEDURE — 3072F PR LOW RISK FOR RETINOPATHY: ICD-10-PCS | Mod: S$GLB,,, | Performed by: INTERNAL MEDICINE

## 2021-04-27 PROCEDURE — 3072F LOW RISK FOR RETINOPATHY: CPT | Mod: S$GLB,,, | Performed by: INTERNAL MEDICINE

## 2021-04-27 PROCEDURE — 99499 UNLISTED E&M SERVICE: CPT | Mod: S$GLB,,, | Performed by: INTERNAL MEDICINE

## 2021-04-27 PROCEDURE — 99214 PR OFFICE/OUTPT VISIT, EST, LEVL IV, 30-39 MIN: ICD-10-PCS | Mod: S$GLB,,, | Performed by: INTERNAL MEDICINE

## 2021-06-03 ENCOUNTER — TELEPHONE (OUTPATIENT)
Dept: FAMILY MEDICINE | Facility: CLINIC | Age: 66
End: 2021-06-03

## 2021-06-08 ENCOUNTER — OFFICE VISIT (OUTPATIENT)
Dept: FAMILY MEDICINE | Facility: CLINIC | Age: 66
End: 2021-06-08
Payer: MEDICARE

## 2021-06-08 VITALS
DIASTOLIC BLOOD PRESSURE: 98 MMHG | HEIGHT: 76 IN | OXYGEN SATURATION: 98 % | HEART RATE: 67 BPM | BODY MASS INDEX: 33.47 KG/M2 | TEMPERATURE: 98 F | SYSTOLIC BLOOD PRESSURE: 152 MMHG

## 2021-06-08 DIAGNOSIS — I69.359 HEMIPARESIS FOLLOWING CEREBROVASCULAR ACCIDENT (CVA): Primary | ICD-10-CM

## 2021-06-08 DIAGNOSIS — M54.9 DORSALGIA, UNSPECIFIED: ICD-10-CM

## 2021-06-08 PROCEDURE — 1125F AMNT PAIN NOTED PAIN PRSNT: CPT | Mod: S$GLB,,, | Performed by: INTERNAL MEDICINE

## 2021-06-08 PROCEDURE — 99999 PR PBB SHADOW E&M-EST. PATIENT-LVL V: ICD-10-PCS | Mod: PBBFAC,,, | Performed by: INTERNAL MEDICINE

## 2021-06-08 PROCEDURE — 3072F LOW RISK FOR RETINOPATHY: CPT | Mod: S$GLB,,, | Performed by: INTERNAL MEDICINE

## 2021-06-08 PROCEDURE — 99213 OFFICE O/P EST LOW 20 MIN: CPT | Mod: S$GLB,,, | Performed by: INTERNAL MEDICINE

## 2021-06-08 PROCEDURE — 99213 PR OFFICE/OUTPT VISIT, EST, LEVL III, 20-29 MIN: ICD-10-PCS | Mod: S$GLB,,, | Performed by: INTERNAL MEDICINE

## 2021-06-08 PROCEDURE — 3008F BODY MASS INDEX DOCD: CPT | Mod: CPTII,S$GLB,, | Performed by: INTERNAL MEDICINE

## 2021-06-08 PROCEDURE — 1125F PR PAIN SEVERITY QUANTIFIED, PAIN PRESENT: ICD-10-PCS | Mod: S$GLB,,, | Performed by: INTERNAL MEDICINE

## 2021-06-08 PROCEDURE — 99999 PR PBB SHADOW E&M-EST. PATIENT-LVL V: CPT | Mod: PBBFAC,,, | Performed by: INTERNAL MEDICINE

## 2021-06-08 PROCEDURE — 3072F PR LOW RISK FOR RETINOPATHY: ICD-10-PCS | Mod: S$GLB,,, | Performed by: INTERNAL MEDICINE

## 2021-06-08 PROCEDURE — 3008F PR BODY MASS INDEX (BMI) DOCUMENTED: ICD-10-PCS | Mod: CPTII,S$GLB,, | Performed by: INTERNAL MEDICINE

## 2021-06-08 RX ORDER — HYDROCODONE BITARTRATE AND ACETAMINOPHEN 5; 325 MG/1; MG/1
1 TABLET ORAL EVERY 4 HOURS PRN
Qty: 28 TABLET | Refills: 0 | Status: SHIPPED | OUTPATIENT
Start: 2021-06-08 | End: 2022-04-26

## 2021-06-09 ENCOUNTER — HOSPITAL ENCOUNTER (OUTPATIENT)
Dept: RADIOLOGY | Facility: HOSPITAL | Age: 66
Discharge: HOME | End: 2021-06-09
Attending: INTERNAL MEDICINE
Payer: MEDICARE

## 2021-06-09 DIAGNOSIS — M54.9 DORSALGIA, UNSPECIFIED: ICD-10-CM

## 2021-06-09 PROCEDURE — 72131 CT LUMBAR SPINE W/O DYE: CPT | Mod: 26,,, | Performed by: RADIOLOGY

## 2021-06-09 PROCEDURE — 72131 CT LUMBAR SPINE WITHOUT CONTRAST: ICD-10-PCS | Mod: 26,,, | Performed by: RADIOLOGY

## 2021-06-09 PROCEDURE — 72131 CT LUMBAR SPINE W/O DYE: CPT | Mod: TC,PO

## 2021-06-10 ENCOUNTER — TELEPHONE (OUTPATIENT)
Dept: PAIN MEDICINE | Facility: HOSPITAL | Age: 66
End: 2021-06-10

## 2021-06-10 DIAGNOSIS — S32.040A COMPRESSION FRACTURE OF L4 VERTEBRA, INITIAL ENCOUNTER: Primary | ICD-10-CM

## 2021-06-14 ENCOUNTER — OFFICE VISIT (OUTPATIENT)
Dept: PAIN MEDICINE | Facility: CLINIC | Age: 66
End: 2021-06-14
Payer: MEDICARE

## 2021-06-14 VITALS
OXYGEN SATURATION: 97 % | BODY MASS INDEX: 32.88 KG/M2 | WEIGHT: 270 LBS | SYSTOLIC BLOOD PRESSURE: 161 MMHG | HEART RATE: 75 BPM | DIASTOLIC BLOOD PRESSURE: 88 MMHG | HEIGHT: 76 IN

## 2021-06-14 DIAGNOSIS — S32.040A COMPRESSION FRACTURE OF L4 VERTEBRA, INITIAL ENCOUNTER: ICD-10-CM

## 2021-06-14 DIAGNOSIS — S32.020A COMPRESSION FRACTURE OF L2 VERTEBRA, INITIAL ENCOUNTER: Primary | ICD-10-CM

## 2021-06-14 DIAGNOSIS — S22.080A COMPRESSION FRACTURE OF T12 VERTEBRA, INITIAL ENCOUNTER: ICD-10-CM

## 2021-06-14 PROCEDURE — 1125F PR PAIN SEVERITY QUANTIFIED, PAIN PRESENT: ICD-10-PCS | Mod: S$GLB,,, | Performed by: ANESTHESIOLOGY

## 2021-06-14 PROCEDURE — 3072F PR LOW RISK FOR RETINOPATHY: ICD-10-PCS | Mod: S$GLB,,, | Performed by: ANESTHESIOLOGY

## 2021-06-14 PROCEDURE — 1125F AMNT PAIN NOTED PAIN PRSNT: CPT | Mod: S$GLB,,, | Performed by: ANESTHESIOLOGY

## 2021-06-14 PROCEDURE — 3072F LOW RISK FOR RETINOPATHY: CPT | Mod: S$GLB,,, | Performed by: ANESTHESIOLOGY

## 2021-06-14 PROCEDURE — 99999 PR PBB SHADOW E&M-EST. PATIENT-LVL III: CPT | Mod: PBBFAC,,, | Performed by: ANESTHESIOLOGY

## 2021-06-14 PROCEDURE — 99204 PR OFFICE/OUTPT VISIT, NEW, LEVL IV, 45-59 MIN: ICD-10-PCS | Mod: S$GLB,,, | Performed by: ANESTHESIOLOGY

## 2021-06-14 PROCEDURE — 99999 PR PBB SHADOW E&M-EST. PATIENT-LVL III: ICD-10-PCS | Mod: PBBFAC,,, | Performed by: ANESTHESIOLOGY

## 2021-06-14 PROCEDURE — 3008F BODY MASS INDEX DOCD: CPT | Mod: CPTII,S$GLB,, | Performed by: ANESTHESIOLOGY

## 2021-06-14 PROCEDURE — 99204 OFFICE O/P NEW MOD 45 MIN: CPT | Mod: S$GLB,,, | Performed by: ANESTHESIOLOGY

## 2021-06-14 PROCEDURE — 3008F PR BODY MASS INDEX (BMI) DOCUMENTED: ICD-10-PCS | Mod: CPTII,S$GLB,, | Performed by: ANESTHESIOLOGY

## 2021-06-21 ENCOUNTER — TELEPHONE (OUTPATIENT)
Dept: PAIN MEDICINE | Facility: CLINIC | Age: 66
End: 2021-06-21

## 2021-06-22 DIAGNOSIS — S32.000A COMPRESSION FRACTURE OF LUMBAR VERTEBRA: ICD-10-CM

## 2021-06-22 DIAGNOSIS — S32.010A COMPRESSION FRACTURE OF L1 VERTEBRA, INITIAL ENCOUNTER: ICD-10-CM

## 2021-06-22 DIAGNOSIS — S32.020A COMPRESSION FRACTURE OF L2 VERTEBRA, INITIAL ENCOUNTER: Primary | ICD-10-CM

## 2021-06-22 DIAGNOSIS — S32.030A COMPRESSION FRACTURE OF L3 VERTEBRA, INITIAL ENCOUNTER: ICD-10-CM

## 2021-06-22 RX ORDER — SODIUM CHLORIDE, SODIUM LACTATE, POTASSIUM CHLORIDE, CALCIUM CHLORIDE 600; 310; 30; 20 MG/100ML; MG/100ML; MG/100ML; MG/100ML
INJECTION, SOLUTION INTRAVENOUS CONTINUOUS
Status: CANCELLED | OUTPATIENT
Start: 2021-06-30

## 2021-06-29 ENCOUNTER — ANESTHESIA EVENT (OUTPATIENT)
Dept: SURGERY | Facility: HOSPITAL | Age: 66
End: 2021-06-29
Payer: MEDICARE

## 2021-06-30 ENCOUNTER — HOSPITAL ENCOUNTER (OUTPATIENT)
Facility: HOSPITAL | Age: 66
Discharge: HOME | End: 2021-06-30
Attending: ANESTHESIOLOGY | Admitting: ANESTHESIOLOGY
Payer: MEDICARE

## 2021-06-30 ENCOUNTER — HOSPITAL ENCOUNTER (OUTPATIENT)
Dept: RADIOLOGY | Facility: HOSPITAL | Age: 66
Discharge: HOME | End: 2021-06-30
Attending: ANESTHESIOLOGY | Admitting: ANESTHESIOLOGY
Payer: MEDICARE

## 2021-06-30 ENCOUNTER — ANESTHESIA (OUTPATIENT)
Dept: SURGERY | Facility: HOSPITAL | Age: 66
End: 2021-06-30
Payer: MEDICARE

## 2021-06-30 VITALS
DIASTOLIC BLOOD PRESSURE: 59 MMHG | BODY MASS INDEX: 32.88 KG/M2 | HEART RATE: 70 BPM | OXYGEN SATURATION: 96 % | RESPIRATION RATE: 19 BRPM | HEIGHT: 76 IN | SYSTOLIC BLOOD PRESSURE: 95 MMHG | TEMPERATURE: 98 F | WEIGHT: 270 LBS

## 2021-06-30 DIAGNOSIS — S32.020A COMPRESSION FRACTURE OF L2 VERTEBRA, INITIAL ENCOUNTER: ICD-10-CM

## 2021-06-30 DIAGNOSIS — M80.08XA AGE-RELATED OSTEOPOROSIS WITH CURRENT PATHOLOGICAL FRACTURE OF VERTEBRA, INITIAL ENCOUNTER: Primary | ICD-10-CM

## 2021-06-30 DIAGNOSIS — S32.010A COMPRESSION FRACTURE OF L1 VERTEBRA, INITIAL ENCOUNTER: ICD-10-CM

## 2021-06-30 DIAGNOSIS — S32.030A COMPRESSION FRACTURE OF L3 VERTEBRA, INITIAL ENCOUNTER: ICD-10-CM

## 2021-06-30 DIAGNOSIS — S32.000A COMPRESSION FRACTURE OF LUMBAR VERTEBRA: ICD-10-CM

## 2021-06-30 LAB — GLUCOSE SERPL-MCNC: 156 MG/DL (ref 70–110)

## 2021-06-30 PROCEDURE — 63600175 PHARM REV CODE 636 W HCPCS: Mod: PO | Performed by: NURSE ANESTHETIST, CERTIFIED REGISTERED

## 2021-06-30 PROCEDURE — 27201423 OPTIME MED/SURG SUP & DEVICES STERILE SUPPLY: Mod: PO | Performed by: ANESTHESIOLOGY

## 2021-06-30 PROCEDURE — 22515 PR PERQ VERTEBRAL AUGMENTATION UNI/BILAT EA ADDL THORACIC/LUMBAR: ICD-10-PCS | Mod: ,,, | Performed by: ANESTHESIOLOGY

## 2021-06-30 PROCEDURE — 63600175 PHARM REV CODE 636 W HCPCS: Mod: PO | Performed by: ANESTHESIOLOGY

## 2021-06-30 PROCEDURE — 36000707: Mod: PO | Performed by: ANESTHESIOLOGY

## 2021-06-30 PROCEDURE — 25000003 PHARM REV CODE 250: Mod: PO | Performed by: ANESTHESIOLOGY

## 2021-06-30 PROCEDURE — 25000003 PHARM REV CODE 250: Mod: PO | Performed by: NURSE ANESTHETIST, CERTIFIED REGISTERED

## 2021-06-30 PROCEDURE — C1726 CATH, BAL DIL, NON-VASCULAR: HCPCS | Mod: PO | Performed by: ANESTHESIOLOGY

## 2021-06-30 PROCEDURE — 25500020 PHARM REV CODE 255: Mod: PO | Performed by: ANESTHESIOLOGY

## 2021-06-30 PROCEDURE — 82962 GLUCOSE BLOOD TEST: CPT | Mod: PO | Performed by: ANESTHESIOLOGY

## 2021-06-30 PROCEDURE — 22514 PR PERQ VERTEBRAL AUGMENTATION UNI/BILAT LUMBAR: ICD-10-PCS | Mod: ,,, | Performed by: ANESTHESIOLOGY

## 2021-06-30 PROCEDURE — 71000015 HC POSTOP RECOV 1ST HR: Mod: PO | Performed by: ANESTHESIOLOGY

## 2021-06-30 PROCEDURE — 22515 PERQ VERTEBRAL AUGMENTATION: CPT | Mod: ,,, | Performed by: ANESTHESIOLOGY

## 2021-06-30 PROCEDURE — C1713 ANCHOR/SCREW BN/BN,TIS/BN: HCPCS | Mod: PO | Performed by: ANESTHESIOLOGY

## 2021-06-30 PROCEDURE — D9220A PRA ANESTHESIA: ICD-10-PCS | Mod: ANES,,, | Performed by: ANESTHESIOLOGY

## 2021-06-30 PROCEDURE — 36000706: Mod: PO | Performed by: ANESTHESIOLOGY

## 2021-06-30 PROCEDURE — 76000 FLUOROSCOPY <1 HR PHYS/QHP: CPT | Mod: TC,PO

## 2021-06-30 PROCEDURE — D9220A PRA ANESTHESIA: Mod: ANES,,, | Performed by: ANESTHESIOLOGY

## 2021-06-30 PROCEDURE — 37000009 HC ANESTHESIA EA ADD 15 MINS: Mod: PO | Performed by: ANESTHESIOLOGY

## 2021-06-30 PROCEDURE — 71000033 HC RECOVERY, INTIAL HOUR: Mod: PO | Performed by: ANESTHESIOLOGY

## 2021-06-30 PROCEDURE — 37000008 HC ANESTHESIA 1ST 15 MINUTES: Mod: PO | Performed by: ANESTHESIOLOGY

## 2021-06-30 PROCEDURE — D9220A PRA ANESTHESIA: Mod: CRNA,,, | Performed by: NURSE ANESTHETIST, CERTIFIED REGISTERED

## 2021-06-30 PROCEDURE — D9220A PRA ANESTHESIA: ICD-10-PCS | Mod: CRNA,,, | Performed by: NURSE ANESTHETIST, CERTIFIED REGISTERED

## 2021-06-30 PROCEDURE — 22514 PERQ VERTEBRAL AUGMENTATION: CPT | Mod: ,,, | Performed by: ANESTHESIOLOGY

## 2021-06-30 PROCEDURE — A4216 STERILE WATER/SALINE, 10 ML: HCPCS | Mod: PO | Performed by: NURSE ANESTHETIST, CERTIFIED REGISTERED

## 2021-06-30 DEVICE — CEMENT AUTOPLEX KIT W/VERTEPLE: Type: IMPLANTABLE DEVICE | Site: BACK | Status: FUNCTIONAL

## 2021-06-30 RX ORDER — SODIUM CHLORIDE, SODIUM LACTATE, POTASSIUM CHLORIDE, CALCIUM CHLORIDE 600; 310; 30; 20 MG/100ML; MG/100ML; MG/100ML; MG/100ML
INJECTION, SOLUTION INTRAVENOUS CONTINUOUS
Status: DISCONTINUED | OUTPATIENT
Start: 2021-06-30 | End: 2021-06-30 | Stop reason: HOSPADM

## 2021-06-30 RX ORDER — LIDOCAINE HCL/PF 100 MG/5ML
SYRINGE (ML) INTRAVENOUS
Status: DISCONTINUED | OUTPATIENT
Start: 2021-06-30 | End: 2021-06-30

## 2021-06-30 RX ORDER — HYDROMORPHONE HYDROCHLORIDE 2 MG/ML
0.2 INJECTION, SOLUTION INTRAMUSCULAR; INTRAVENOUS; SUBCUTANEOUS EVERY 5 MIN PRN
Status: DISCONTINUED | OUTPATIENT
Start: 2021-06-30 | End: 2021-06-30 | Stop reason: HOSPADM

## 2021-06-30 RX ORDER — LIDOCAINE HYDROCHLORIDE 10 MG/ML
INJECTION, SOLUTION EPIDURAL; INFILTRATION; INTRACAUDAL; PERINEURAL
Status: DISCONTINUED | OUTPATIENT
Start: 2021-06-30 | End: 2021-06-30 | Stop reason: HOSPADM

## 2021-06-30 RX ORDER — CEFAZOLIN SODIUM 2 G/50ML
2 SOLUTION INTRAVENOUS
Status: COMPLETED | OUTPATIENT
Start: 2021-06-30 | End: 2021-06-30

## 2021-06-30 RX ORDER — OXYCODONE HYDROCHLORIDE 5 MG/1
5 TABLET ORAL
Status: DISCONTINUED | OUTPATIENT
Start: 2021-06-30 | End: 2021-06-30 | Stop reason: HOSPADM

## 2021-06-30 RX ORDER — ONDANSETRON 2 MG/ML
INJECTION INTRAMUSCULAR; INTRAVENOUS
Status: DISCONTINUED | OUTPATIENT
Start: 2021-06-30 | End: 2021-06-30

## 2021-06-30 RX ORDER — MIDAZOLAM HYDROCHLORIDE 1 MG/ML
INJECTION INTRAMUSCULAR; INTRAVENOUS
Status: DISCONTINUED | OUTPATIENT
Start: 2021-06-30 | End: 2021-06-30

## 2021-06-30 RX ORDER — EPHEDRINE SULFATE 50 MG/ML
INJECTION, SOLUTION INTRAVENOUS
Status: DISCONTINUED | OUTPATIENT
Start: 2021-06-30 | End: 2021-06-30

## 2021-06-30 RX ORDER — KETAMINE HYDROCHLORIDE 100 MG/ML
INJECTION, SOLUTION INTRAMUSCULAR; INTRAVENOUS
Status: DISCONTINUED | OUTPATIENT
Start: 2021-06-30 | End: 2021-06-30

## 2021-06-30 RX ORDER — LIDOCAINE HYDROCHLORIDE 10 MG/ML
1 INJECTION, SOLUTION EPIDURAL; INFILTRATION; INTRACAUDAL; PERINEURAL ONCE
Status: DISCONTINUED | OUTPATIENT
Start: 2021-06-30 | End: 2021-06-30 | Stop reason: HOSPADM

## 2021-06-30 RX ORDER — FENTANYL CITRATE 50 UG/ML
INJECTION, SOLUTION INTRAMUSCULAR; INTRAVENOUS
Status: DISCONTINUED | OUTPATIENT
Start: 2021-06-30 | End: 2021-06-30

## 2021-06-30 RX ORDER — FENTANYL CITRATE 50 UG/ML
25 INJECTION, SOLUTION INTRAMUSCULAR; INTRAVENOUS EVERY 5 MIN PRN
Status: DISCONTINUED | OUTPATIENT
Start: 2021-06-30 | End: 2021-06-30 | Stop reason: HOSPADM

## 2021-06-30 RX ORDER — DEXMEDETOMIDINE HYDROCHLORIDE 100 UG/ML
INJECTION, SOLUTION INTRAVENOUS
Status: DISCONTINUED | OUTPATIENT
Start: 2021-06-30 | End: 2021-06-30

## 2021-06-30 RX ADMIN — DEXMEDETOMIDINE HYDROCHLORIDE 8 MCG: 100 INJECTION, SOLUTION, CONCENTRATE INTRAVENOUS at 07:06

## 2021-06-30 RX ADMIN — DEXMEDETOMIDINE HYDROCHLORIDE 8 MCG: 100 INJECTION, SOLUTION, CONCENTRATE INTRAVENOUS at 06:06

## 2021-06-30 RX ADMIN — CEFAZOLIN SODIUM 1 G: 1 INJECTION, POWDER, FOR SOLUTION INTRAMUSCULAR; INTRAVENOUS at 07:06

## 2021-06-30 RX ADMIN — MIDAZOLAM HYDROCHLORIDE 2 MG: 1 INJECTION, SOLUTION INTRAMUSCULAR; INTRAVENOUS at 07:06

## 2021-06-30 RX ADMIN — FENTANYL CITRATE 50 MCG: 50 INJECTION, SOLUTION INTRAMUSCULAR; INTRAVENOUS at 07:06

## 2021-06-30 RX ADMIN — LIDOCAINE HYDROCHLORIDE 100 MG: 20 INJECTION, SOLUTION INTRAVENOUS at 07:06

## 2021-06-30 RX ADMIN — FENTANYL CITRATE 25 MCG: 50 INJECTION, SOLUTION INTRAMUSCULAR; INTRAVENOUS at 07:06

## 2021-06-30 RX ADMIN — FENTANYL CITRATE 25 MCG: 50 INJECTION, SOLUTION INTRAMUSCULAR; INTRAVENOUS at 08:06

## 2021-06-30 RX ADMIN — DEXMEDETOMIDINE HYDROCHLORIDE 0.5 MCG/KG/HR: 100 INJECTION, SOLUTION, CONCENTRATE INTRAVENOUS at 07:06

## 2021-06-30 RX ADMIN — KETAMINE HYDROCHLORIDE 20 MG: 100 INJECTION, SOLUTION, CONCENTRATE INTRAMUSCULAR; INTRAVENOUS at 07:06

## 2021-06-30 RX ADMIN — ONDANSETRON 4 MG: 2 INJECTION INTRAMUSCULAR; INTRAVENOUS at 07:06

## 2021-06-30 RX ADMIN — SODIUM CHLORIDE, SODIUM LACTATE, POTASSIUM CHLORIDE, AND CALCIUM CHLORIDE: .6; .31; .03; .02 INJECTION, SOLUTION INTRAVENOUS at 06:06

## 2021-06-30 RX ADMIN — CEFAZOLIN SODIUM 2 G: 1 INJECTION, POWDER, FOR SOLUTION INTRAMUSCULAR; INTRAVENOUS at 07:06

## 2021-06-30 RX ADMIN — EPHEDRINE SULFATE 10 MG: 50 INJECTION INTRAVENOUS at 07:06

## 2021-07-03 ENCOUNTER — PATIENT OUTREACH (OUTPATIENT)
Dept: ADMINISTRATIVE | Facility: OTHER | Age: 66
End: 2021-07-03

## 2021-07-06 ENCOUNTER — HOSPITAL ENCOUNTER (OUTPATIENT)
Dept: RADIOLOGY | Facility: HOSPITAL | Age: 66
Discharge: HOME | End: 2021-07-06
Attending: PHYSICIAN ASSISTANT
Payer: MEDICARE

## 2021-07-06 ENCOUNTER — TELEPHONE (OUTPATIENT)
Dept: PAIN MEDICINE | Facility: CLINIC | Age: 66
End: 2021-07-06

## 2021-07-06 ENCOUNTER — OFFICE VISIT (OUTPATIENT)
Dept: PAIN MEDICINE | Facility: CLINIC | Age: 66
End: 2021-07-06
Payer: MEDICARE

## 2021-07-06 VITALS
HEART RATE: 79 BPM | HEIGHT: 76 IN | DIASTOLIC BLOOD PRESSURE: 75 MMHG | SYSTOLIC BLOOD PRESSURE: 135 MMHG | OXYGEN SATURATION: 97 % | BODY MASS INDEX: 32.88 KG/M2 | WEIGHT: 270 LBS

## 2021-07-06 DIAGNOSIS — S32.000S COMPRESSION FRACTURE OF LUMBAR VERTEBRA, UNSPECIFIED LUMBAR VERTEBRAL LEVEL, SEQUELA: ICD-10-CM

## 2021-07-06 DIAGNOSIS — M54.16 LUMBAR RADICULOPATHY: ICD-10-CM

## 2021-07-06 DIAGNOSIS — M51.36 DDD (DEGENERATIVE DISC DISEASE), LUMBAR: ICD-10-CM

## 2021-07-06 DIAGNOSIS — S32.000S COMPRESSION FRACTURE OF LUMBAR VERTEBRA, UNSPECIFIED LUMBAR VERTEBRAL LEVEL, SEQUELA: Primary | ICD-10-CM

## 2021-07-06 PROCEDURE — 99213 PR OFFICE/OUTPT VISIT, EST, LEVL III, 20-29 MIN: ICD-10-PCS | Mod: S$GLB,,, | Performed by: PHYSICIAN ASSISTANT

## 2021-07-06 PROCEDURE — 3072F LOW RISK FOR RETINOPATHY: CPT | Mod: S$GLB,,, | Performed by: PHYSICIAN ASSISTANT

## 2021-07-06 PROCEDURE — 3008F PR BODY MASS INDEX (BMI) DOCUMENTED: ICD-10-PCS | Mod: CPTII,S$GLB,, | Performed by: PHYSICIAN ASSISTANT

## 2021-07-06 PROCEDURE — 72110 XR LUMBAR SPINE COMPLETE 5 VIEW: ICD-10-PCS | Mod: 26,,, | Performed by: RADIOLOGY

## 2021-07-06 PROCEDURE — 99999 PR PBB SHADOW E&M-EST. PATIENT-LVL V: ICD-10-PCS | Mod: PBBFAC,,, | Performed by: PHYSICIAN ASSISTANT

## 2021-07-06 PROCEDURE — 72110 X-RAY EXAM L-2 SPINE 4/>VWS: CPT | Mod: 26,,, | Performed by: RADIOLOGY

## 2021-07-06 PROCEDURE — 1125F AMNT PAIN NOTED PAIN PRSNT: CPT | Mod: S$GLB,,, | Performed by: PHYSICIAN ASSISTANT

## 2021-07-06 PROCEDURE — 99213 OFFICE O/P EST LOW 20 MIN: CPT | Mod: S$GLB,,, | Performed by: PHYSICIAN ASSISTANT

## 2021-07-06 PROCEDURE — 99999 PR PBB SHADOW E&M-EST. PATIENT-LVL V: CPT | Mod: PBBFAC,,, | Performed by: PHYSICIAN ASSISTANT

## 2021-07-06 PROCEDURE — 72110 X-RAY EXAM L-2 SPINE 4/>VWS: CPT | Mod: TC,PO

## 2021-07-06 PROCEDURE — 3008F BODY MASS INDEX DOCD: CPT | Mod: CPTII,S$GLB,, | Performed by: PHYSICIAN ASSISTANT

## 2021-07-06 PROCEDURE — 1125F PR PAIN SEVERITY QUANTIFIED, PAIN PRESENT: ICD-10-PCS | Mod: S$GLB,,, | Performed by: PHYSICIAN ASSISTANT

## 2021-07-06 PROCEDURE — 3072F PR LOW RISK FOR RETINOPATHY: ICD-10-PCS | Mod: S$GLB,,, | Performed by: PHYSICIAN ASSISTANT

## 2021-07-07 ENCOUNTER — PATIENT MESSAGE (OUTPATIENT)
Dept: PAIN MEDICINE | Facility: CLINIC | Age: 66
End: 2021-07-07

## 2021-07-27 DIAGNOSIS — S32.020A COMPRESSION FRACTURE OF L2 VERTEBRA, INITIAL ENCOUNTER: Primary | ICD-10-CM

## 2021-07-28 ENCOUNTER — HOSPITAL ENCOUNTER (OUTPATIENT)
Facility: HOSPITAL | Age: 66
Discharge: HOME | End: 2021-07-28
Attending: ANESTHESIOLOGY | Admitting: ANESTHESIOLOGY
Payer: MEDICARE

## 2021-07-28 ENCOUNTER — HOSPITAL ENCOUNTER (OUTPATIENT)
Dept: RADIOLOGY | Facility: HOSPITAL | Age: 66
Discharge: HOME | End: 2021-07-28
Attending: ANESTHESIOLOGY
Payer: MEDICARE

## 2021-07-28 VITALS
OXYGEN SATURATION: 98 % | HEART RATE: 72 BPM | TEMPERATURE: 97 F | DIASTOLIC BLOOD PRESSURE: 102 MMHG | SYSTOLIC BLOOD PRESSURE: 165 MMHG | RESPIRATION RATE: 16 BRPM

## 2021-07-28 DIAGNOSIS — M54.16 LUMBAR RADICULOPATHY: Primary | ICD-10-CM

## 2021-07-28 DIAGNOSIS — S32.020A COMPRESSION FRACTURE OF L2 VERTEBRA, INITIAL ENCOUNTER: ICD-10-CM

## 2021-07-28 LAB — GLUCOSE SERPL-MCNC: 130 MG/DL (ref 70–110)

## 2021-07-28 PROCEDURE — 62323 NJX INTERLAMINAR LMBR/SAC: CPT | Mod: ,,, | Performed by: ANESTHESIOLOGY

## 2021-07-28 PROCEDURE — 63600175 PHARM REV CODE 636 W HCPCS: Mod: PO | Performed by: ANESTHESIOLOGY

## 2021-07-28 PROCEDURE — 25500020 PHARM REV CODE 255: Mod: PO | Performed by: ANESTHESIOLOGY

## 2021-07-28 PROCEDURE — 76000 FLUOROSCOPY <1 HR PHYS/QHP: CPT | Mod: TC,PO

## 2021-07-28 PROCEDURE — 25000003 PHARM REV CODE 250: Mod: PO | Performed by: ANESTHESIOLOGY

## 2021-07-28 PROCEDURE — 62323 PR INJ LUMBAR/SACRAL, W/IMAGING GUIDANCE: ICD-10-PCS | Mod: ,,, | Performed by: ANESTHESIOLOGY

## 2021-07-28 PROCEDURE — 62323 NJX INTERLAMINAR LMBR/SAC: CPT | Mod: PO | Performed by: ANESTHESIOLOGY

## 2021-07-28 RX ORDER — ALPRAZOLAM 0.5 MG/1
1 TABLET, ORALLY DISINTEGRATING ORAL ONCE AS NEEDED
Status: COMPLETED | OUTPATIENT
Start: 2021-07-28 | End: 2021-07-28

## 2021-07-28 RX ORDER — LIDOCAINE HYDROCHLORIDE 10 MG/ML
INJECTION, SOLUTION EPIDURAL; INFILTRATION; INTRACAUDAL; PERINEURAL
Status: DISCONTINUED | OUTPATIENT
Start: 2021-07-28 | End: 2021-07-28 | Stop reason: HOSPADM

## 2021-07-28 RX ORDER — METHYLPREDNISOLONE ACETATE 80 MG/ML
INJECTION, SUSPENSION INTRA-ARTICULAR; INTRALESIONAL; INTRAMUSCULAR; SOFT TISSUE
Status: DISCONTINUED | OUTPATIENT
Start: 2021-07-28 | End: 2021-07-28 | Stop reason: HOSPADM

## 2021-07-28 RX ORDER — SODIUM BICARBONATE 42 MG/ML
INJECTION, SOLUTION INTRAVENOUS
Status: DISCONTINUED | OUTPATIENT
Start: 2021-07-28 | End: 2021-07-28 | Stop reason: HOSPADM

## 2021-07-28 RX ADMIN — ALPRAZOLAM 1 MG: 0.5 TABLET, ORALLY DISINTEGRATING ORAL at 02:07

## 2021-08-11 ENCOUNTER — PATIENT OUTREACH (OUTPATIENT)
Dept: ADMINISTRATIVE | Facility: OTHER | Age: 66
End: 2021-08-11

## 2021-08-12 ENCOUNTER — OFFICE VISIT (OUTPATIENT)
Dept: PAIN MEDICINE | Facility: CLINIC | Age: 66
End: 2021-08-12
Payer: MEDICARE

## 2021-08-12 VITALS
BODY MASS INDEX: 32.88 KG/M2 | WEIGHT: 270 LBS | HEIGHT: 76 IN | HEART RATE: 66 BPM | SYSTOLIC BLOOD PRESSURE: 116 MMHG | TEMPERATURE: 99 F | DIASTOLIC BLOOD PRESSURE: 74 MMHG

## 2021-08-12 DIAGNOSIS — M54.16 LUMBAR RADICULOPATHY: ICD-10-CM

## 2021-08-12 DIAGNOSIS — M54.9 DORSALGIA: Primary | ICD-10-CM

## 2021-08-12 PROCEDURE — 3074F PR MOST RECENT SYSTOLIC BLOOD PRESSURE < 130 MM HG: ICD-10-PCS | Mod: CPTII,S$GLB,, | Performed by: ANESTHESIOLOGY

## 2021-08-12 PROCEDURE — 3044F PR MOST RECENT HEMOGLOBIN A1C LEVEL <7.0%: ICD-10-PCS | Mod: CPTII,S$GLB,, | Performed by: ANESTHESIOLOGY

## 2021-08-12 PROCEDURE — 99499 RISK ADDL DX/OHS AUDIT: ICD-10-PCS | Mod: HCNC,S$GLB,, | Performed by: ANESTHESIOLOGY

## 2021-08-12 PROCEDURE — 3074F SYST BP LT 130 MM HG: CPT | Mod: CPTII,S$GLB,, | Performed by: ANESTHESIOLOGY

## 2021-08-12 PROCEDURE — 99213 PR OFFICE/OUTPT VISIT, EST, LEVL III, 20-29 MIN: ICD-10-PCS | Mod: S$GLB,,, | Performed by: ANESTHESIOLOGY

## 2021-08-12 PROCEDURE — 3078F DIAST BP <80 MM HG: CPT | Mod: CPTII,S$GLB,, | Performed by: ANESTHESIOLOGY

## 2021-08-12 PROCEDURE — 1126F PR PAIN SEVERITY QUANTIFIED, NO PAIN PRESENT: ICD-10-PCS | Mod: CPTII,S$GLB,, | Performed by: ANESTHESIOLOGY

## 2021-08-12 PROCEDURE — 3078F PR MOST RECENT DIASTOLIC BLOOD PRESSURE < 80 MM HG: ICD-10-PCS | Mod: CPTII,S$GLB,, | Performed by: ANESTHESIOLOGY

## 2021-08-12 PROCEDURE — 1160F RVW MEDS BY RX/DR IN RCRD: CPT | Mod: CPTII,S$GLB,, | Performed by: ANESTHESIOLOGY

## 2021-08-12 PROCEDURE — 3072F PR LOW RISK FOR RETINOPATHY: ICD-10-PCS | Mod: CPTII,S$GLB,, | Performed by: ANESTHESIOLOGY

## 2021-08-12 PROCEDURE — 1160F PR REVIEW ALL MEDS BY PRESCRIBER/CLIN PHARMACIST DOCUMENTED: ICD-10-PCS | Mod: CPTII,S$GLB,, | Performed by: ANESTHESIOLOGY

## 2021-08-12 PROCEDURE — 1159F PR MEDICATION LIST DOCUMENTED IN MEDICAL RECORD: ICD-10-PCS | Mod: CPTII,S$GLB,, | Performed by: ANESTHESIOLOGY

## 2021-08-12 PROCEDURE — 99213 OFFICE O/P EST LOW 20 MIN: CPT | Mod: S$GLB,,, | Performed by: ANESTHESIOLOGY

## 2021-08-12 PROCEDURE — 3008F BODY MASS INDEX DOCD: CPT | Mod: CPTII,S$GLB,, | Performed by: ANESTHESIOLOGY

## 2021-08-12 PROCEDURE — 99999 PR PBB SHADOW E&M-EST. PATIENT-LVL V: ICD-10-PCS | Mod: PBBFAC,,, | Performed by: ANESTHESIOLOGY

## 2021-08-12 PROCEDURE — 3072F LOW RISK FOR RETINOPATHY: CPT | Mod: CPTII,S$GLB,, | Performed by: ANESTHESIOLOGY

## 2021-08-12 PROCEDURE — 1159F MED LIST DOCD IN RCRD: CPT | Mod: CPTII,S$GLB,, | Performed by: ANESTHESIOLOGY

## 2021-08-12 PROCEDURE — 99499 UNLISTED E&M SERVICE: CPT | Mod: HCNC,S$GLB,, | Performed by: ANESTHESIOLOGY

## 2021-08-12 PROCEDURE — 1126F AMNT PAIN NOTED NONE PRSNT: CPT | Mod: CPTII,S$GLB,, | Performed by: ANESTHESIOLOGY

## 2021-08-12 PROCEDURE — 3044F HG A1C LEVEL LT 7.0%: CPT | Mod: CPTII,S$GLB,, | Performed by: ANESTHESIOLOGY

## 2021-08-12 PROCEDURE — 99999 PR PBB SHADOW E&M-EST. PATIENT-LVL V: CPT | Mod: PBBFAC,,, | Performed by: ANESTHESIOLOGY

## 2021-08-12 PROCEDURE — 3008F PR BODY MASS INDEX (BMI) DOCUMENTED: ICD-10-PCS | Mod: CPTII,S$GLB,, | Performed by: ANESTHESIOLOGY

## 2021-08-24 PROBLEM — M54.9 DORSALGIA: Status: ACTIVE | Noted: 2021-08-24

## 2021-09-07 ENCOUNTER — PATIENT OUTREACH (OUTPATIENT)
Dept: ADMINISTRATIVE | Facility: HOSPITAL | Age: 66
End: 2021-09-07

## 2021-09-12 ENCOUNTER — PATIENT OUTREACH (OUTPATIENT)
Dept: ADMINISTRATIVE | Facility: HOSPITAL | Age: 66
End: 2021-09-12

## 2021-09-27 ENCOUNTER — PATIENT OUTREACH (OUTPATIENT)
Dept: ADMINISTRATIVE | Facility: HOSPITAL | Age: 66
End: 2021-09-27

## 2021-10-27 ENCOUNTER — LAB VISIT (OUTPATIENT)
Dept: LAB | Facility: HOSPITAL | Age: 66
End: 2021-10-27
Attending: INTERNAL MEDICINE
Payer: MEDICARE

## 2021-10-27 ENCOUNTER — PATIENT MESSAGE (OUTPATIENT)
Dept: FAMILY MEDICINE | Facility: CLINIC | Age: 66
End: 2021-10-27
Payer: MEDICARE

## 2021-10-27 ENCOUNTER — OFFICE VISIT (OUTPATIENT)
Dept: FAMILY MEDICINE | Facility: CLINIC | Age: 66
End: 2021-10-27
Payer: MEDICARE

## 2021-10-27 VITALS
SYSTOLIC BLOOD PRESSURE: 126 MMHG | BODY MASS INDEX: 31.91 KG/M2 | HEART RATE: 63 BPM | OXYGEN SATURATION: 95 % | DIASTOLIC BLOOD PRESSURE: 72 MMHG | WEIGHT: 262.13 LBS

## 2021-10-27 DIAGNOSIS — R53.83 FATIGUE, UNSPECIFIED TYPE: ICD-10-CM

## 2021-10-27 DIAGNOSIS — E11.51 TYPE II DIABETES MELLITUS WITH PERIPHERAL CIRCULATORY DISORDER: Primary | ICD-10-CM

## 2021-10-27 DIAGNOSIS — E11.51 TYPE II DIABETES MELLITUS WITH PERIPHERAL CIRCULATORY DISORDER: ICD-10-CM

## 2021-10-27 DIAGNOSIS — E11.9 TYPE 2 DIABETES MELLITUS WITHOUT COMPLICATION, WITHOUT LONG-TERM CURRENT USE OF INSULIN: ICD-10-CM

## 2021-10-27 LAB
ALBUMIN SERPL BCP-MCNC: 3.7 G/DL (ref 3.5–5.2)
ALP SERPL-CCNC: 84 U/L (ref 55–135)
ALT SERPL W/O P-5'-P-CCNC: 26 U/L (ref 10–44)
ANION GAP SERPL CALC-SCNC: 9 MMOL/L (ref 8–16)
AST SERPL-CCNC: 20 U/L (ref 10–40)
BASOPHILS # BLD AUTO: 0.05 K/UL (ref 0–0.2)
BASOPHILS NFR BLD: 0.7 % (ref 0–1.9)
BILIRUB SERPL-MCNC: 0.5 MG/DL (ref 0.1–1)
BUN SERPL-MCNC: 14 MG/DL (ref 8–23)
CALCIUM SERPL-MCNC: 9.6 MG/DL (ref 8.7–10.5)
CHLORIDE SERPL-SCNC: 106 MMOL/L (ref 95–110)
CO2 SERPL-SCNC: 24 MMOL/L (ref 23–29)
CREAT SERPL-MCNC: 0.7 MG/DL (ref 0.5–1.4)
DIFFERENTIAL METHOD: ABNORMAL
EOSINOPHIL # BLD AUTO: 0.3 K/UL (ref 0–0.5)
EOSINOPHIL NFR BLD: 4.3 % (ref 0–8)
ERYTHROCYTE [DISTWIDTH] IN BLOOD BY AUTOMATED COUNT: 12.2 % (ref 11.5–14.5)
EST. GFR  (AFRICAN AMERICAN): >60 ML/MIN/1.73 M^2
EST. GFR  (NON AFRICAN AMERICAN): >60 ML/MIN/1.73 M^2
ESTIMATED AVG GLUCOSE: 103 MG/DL (ref 68–131)
GLUCOSE SERPL-MCNC: 142 MG/DL (ref 70–110)
HBA1C MFR BLD: 5.2 % (ref 4–5.6)
HCT VFR BLD AUTO: 43.5 % (ref 40–54)
HGB BLD-MCNC: 15.7 G/DL (ref 14–18)
IMM GRANULOCYTES # BLD AUTO: 0.02 K/UL (ref 0–0.04)
IMM GRANULOCYTES NFR BLD AUTO: 0.3 % (ref 0–0.5)
LYMPHOCYTES # BLD AUTO: 1.5 K/UL (ref 1–4.8)
LYMPHOCYTES NFR BLD: 22.4 % (ref 18–48)
MCH RBC QN AUTO: 32.9 PG (ref 27–31)
MCHC RBC AUTO-ENTMCNC: 36.1 G/DL (ref 32–36)
MCV RBC AUTO: 91 FL (ref 82–98)
MONOCYTES # BLD AUTO: 0.6 K/UL (ref 0.3–1)
MONOCYTES NFR BLD: 8.9 % (ref 4–15)
NEUTROPHILS # BLD AUTO: 4.3 K/UL (ref 1.8–7.7)
NEUTROPHILS NFR BLD: 63.4 % (ref 38–73)
NRBC BLD-RTO: 0 /100 WBC
PLATELET # BLD AUTO: 188 K/UL (ref 150–450)
PMV BLD AUTO: 10.4 FL (ref 9.2–12.9)
POTASSIUM SERPL-SCNC: 4.4 MMOL/L (ref 3.5–5.1)
PROT SERPL-MCNC: 7.2 G/DL (ref 6–8.4)
RBC # BLD AUTO: 4.77 M/UL (ref 4.6–6.2)
SODIUM SERPL-SCNC: 139 MMOL/L (ref 136–145)
WBC # BLD AUTO: 6.82 K/UL (ref 3.9–12.7)

## 2021-10-27 PROCEDURE — G0008 FLU VACCINE (QUAD) GREATER THAN OR EQUAL TO 3YO PRESERVATIVE FREE IM: ICD-10-PCS | Mod: HCNC,S$GLB,, | Performed by: INTERNAL MEDICINE

## 2021-10-27 PROCEDURE — 83036 HEMOGLOBIN GLYCOSYLATED A1C: CPT | Mod: HCNC | Performed by: INTERNAL MEDICINE

## 2021-10-27 PROCEDURE — 3061F NEG MICROALBUMINURIA REV: CPT | Mod: HCNC,CPTII,S$GLB, | Performed by: INTERNAL MEDICINE

## 2021-10-27 PROCEDURE — 3044F PR MOST RECENT HEMOGLOBIN A1C LEVEL <7.0%: ICD-10-PCS | Mod: HCNC,CPTII,S$GLB, | Performed by: INTERNAL MEDICINE

## 2021-10-27 PROCEDURE — G0008 ADMIN INFLUENZA VIRUS VAC: HCPCS | Mod: HCNC,S$GLB,, | Performed by: INTERNAL MEDICINE

## 2021-10-27 PROCEDURE — 99214 PR OFFICE/OUTPT VISIT, EST, LEVL IV, 30-39 MIN: ICD-10-PCS | Mod: HCNC,S$GLB,, | Performed by: INTERNAL MEDICINE

## 2021-10-27 PROCEDURE — 36415 COLL VENOUS BLD VENIPUNCTURE: CPT | Mod: HCNC,PO | Performed by: INTERNAL MEDICINE

## 2021-10-27 PROCEDURE — 3072F PR LOW RISK FOR RETINOPATHY: ICD-10-PCS | Mod: HCNC,CPTII,S$GLB, | Performed by: INTERNAL MEDICINE

## 2021-10-27 PROCEDURE — 3072F LOW RISK FOR RETINOPATHY: CPT | Mod: HCNC,CPTII,S$GLB, | Performed by: INTERNAL MEDICINE

## 2021-10-27 PROCEDURE — 90686 IIV4 VACC NO PRSV 0.5 ML IM: CPT | Mod: HCNC,S$GLB,, | Performed by: INTERNAL MEDICINE

## 2021-10-27 PROCEDURE — 3074F SYST BP LT 130 MM HG: CPT | Mod: HCNC,CPTII,S$GLB, | Performed by: INTERNAL MEDICINE

## 2021-10-27 PROCEDURE — 3061F PR NEG MICROALBUMINURIA RESULT DOCUMENTED/REVIEW: ICD-10-PCS | Mod: HCNC,CPTII,S$GLB, | Performed by: INTERNAL MEDICINE

## 2021-10-27 PROCEDURE — 3044F HG A1C LEVEL LT 7.0%: CPT | Mod: HCNC,CPTII,S$GLB, | Performed by: INTERNAL MEDICINE

## 2021-10-27 PROCEDURE — 3074F PR MOST RECENT SYSTOLIC BLOOD PRESSURE < 130 MM HG: ICD-10-PCS | Mod: HCNC,CPTII,S$GLB, | Performed by: INTERNAL MEDICINE

## 2021-10-27 PROCEDURE — 99999 PR PBB SHADOW E&M-EST. PATIENT-LVL IV: ICD-10-PCS | Mod: PBBFAC,HCNC,, | Performed by: INTERNAL MEDICINE

## 2021-10-27 PROCEDURE — 99499 RISK ADDL DX/OHS AUDIT: ICD-10-PCS | Mod: S$GLB,,, | Performed by: INTERNAL MEDICINE

## 2021-10-27 PROCEDURE — 80053 COMPREHEN METABOLIC PANEL: CPT | Mod: HCNC | Performed by: INTERNAL MEDICINE

## 2021-10-27 PROCEDURE — 3008F BODY MASS INDEX DOCD: CPT | Mod: HCNC,CPTII,S$GLB, | Performed by: INTERNAL MEDICINE

## 2021-10-27 PROCEDURE — 3008F PR BODY MASS INDEX (BMI) DOCUMENTED: ICD-10-PCS | Mod: HCNC,CPTII,S$GLB, | Performed by: INTERNAL MEDICINE

## 2021-10-27 PROCEDURE — 3078F DIAST BP <80 MM HG: CPT | Mod: HCNC,CPTII,S$GLB, | Performed by: INTERNAL MEDICINE

## 2021-10-27 PROCEDURE — 99214 OFFICE O/P EST MOD 30 MIN: CPT | Mod: HCNC,S$GLB,, | Performed by: INTERNAL MEDICINE

## 2021-10-27 PROCEDURE — 4010F ACE/ARB THERAPY RXD/TAKEN: CPT | Mod: HCNC,CPTII,S$GLB, | Performed by: INTERNAL MEDICINE

## 2021-10-27 PROCEDURE — 1159F PR MEDICATION LIST DOCUMENTED IN MEDICAL RECORD: ICD-10-PCS | Mod: HCNC,CPTII,S$GLB, | Performed by: INTERNAL MEDICINE

## 2021-10-27 PROCEDURE — 1159F MED LIST DOCD IN RCRD: CPT | Mod: HCNC,CPTII,S$GLB, | Performed by: INTERNAL MEDICINE

## 2021-10-27 PROCEDURE — 99499 UNLISTED E&M SERVICE: CPT | Mod: S$GLB,,, | Performed by: INTERNAL MEDICINE

## 2021-10-27 PROCEDURE — 3066F PR DOCUMENTATION OF TREATMENT FOR NEPHROPATHY: ICD-10-PCS | Mod: HCNC,CPTII,S$GLB, | Performed by: INTERNAL MEDICINE

## 2021-10-27 PROCEDURE — 90686 FLU VACCINE (QUAD) GREATER THAN OR EQUAL TO 3YO PRESERVATIVE FREE IM: ICD-10-PCS | Mod: HCNC,S$GLB,, | Performed by: INTERNAL MEDICINE

## 2021-10-27 PROCEDURE — 99999 PR PBB SHADOW E&M-EST. PATIENT-LVL IV: CPT | Mod: PBBFAC,HCNC,, | Performed by: INTERNAL MEDICINE

## 2021-10-27 PROCEDURE — 82570 ASSAY OF URINE CREATININE: CPT | Mod: HCNC | Performed by: INTERNAL MEDICINE

## 2021-10-27 PROCEDURE — 3078F PR MOST RECENT DIASTOLIC BLOOD PRESSURE < 80 MM HG: ICD-10-PCS | Mod: HCNC,CPTII,S$GLB, | Performed by: INTERNAL MEDICINE

## 2021-10-27 PROCEDURE — 85025 COMPLETE CBC W/AUTO DIFF WBC: CPT | Mod: HCNC | Performed by: INTERNAL MEDICINE

## 2021-10-27 PROCEDURE — 3066F NEPHROPATHY DOC TX: CPT | Mod: HCNC,CPTII,S$GLB, | Performed by: INTERNAL MEDICINE

## 2021-10-27 PROCEDURE — 4010F PR ACE/ARB THEARPY RXD/TAKEN: ICD-10-PCS | Mod: HCNC,CPTII,S$GLB, | Performed by: INTERNAL MEDICINE

## 2021-10-27 RX ORDER — SERTRALINE HYDROCHLORIDE 100 MG/1
100 TABLET, FILM COATED ORAL DAILY
Qty: 90 TABLET | Refills: 3 | Status: SHIPPED | OUTPATIENT
Start: 2021-10-27 | End: 2022-11-30 | Stop reason: SDUPTHER

## 2021-10-27 RX ORDER — MECLIZINE HCL 12.5 MG 12.5 MG/1
12.5 TABLET ORAL 3 TIMES DAILY PRN
Qty: 30 TABLET | Refills: 0 | Status: SHIPPED | OUTPATIENT
Start: 2021-10-27 | End: 2022-01-10 | Stop reason: SDUPTHER

## 2021-10-28 LAB
ALBUMIN/CREAT UR: 3.1 UG/MG (ref 0–30)
CREAT UR-MCNC: 260 MG/DL (ref 23–375)
MICROALBUMIN UR DL<=1MG/L-MCNC: 8 UG/ML

## 2021-11-04 ENCOUNTER — IMMUNIZATION (OUTPATIENT)
Dept: FAMILY MEDICINE | Facility: CLINIC | Age: 66
End: 2021-11-04
Payer: MEDICARE

## 2021-11-04 DIAGNOSIS — Z23 NEED FOR VACCINATION: Primary | ICD-10-CM

## 2021-11-04 PROCEDURE — 91300 COVID-19, MRNA, LNP-S, PF, 30 MCG/0.3 ML DOSE VACCINE: CPT | Mod: HCNC,PBBFAC | Performed by: INTERNAL MEDICINE

## 2021-11-19 ENCOUNTER — OFFICE VISIT (OUTPATIENT)
Dept: OPTOMETRY | Facility: CLINIC | Age: 66
End: 2021-11-19
Payer: COMMERCIAL

## 2021-11-19 DIAGNOSIS — E11.51 TYPE II DIABETES MELLITUS WITH PERIPHERAL CIRCULATORY DISORDER: ICD-10-CM

## 2021-11-19 DIAGNOSIS — E11.9 DIABETES MELLITUS TYPE 2 WITHOUT RETINOPATHY: Primary | ICD-10-CM

## 2021-11-19 PROCEDURE — 99999 PR PBB SHADOW E&M-EST. PATIENT-LVL IV: ICD-10-PCS | Mod: PBBFAC,,, | Performed by: OPTOMETRIST

## 2021-11-19 PROCEDURE — 99999 PR PBB SHADOW E&M-EST. PATIENT-LVL IV: CPT | Mod: PBBFAC,,, | Performed by: OPTOMETRIST

## 2021-11-19 PROCEDURE — 92014 PR EYE EXAM, EST PATIENT,COMPREHESV: ICD-10-PCS | Mod: S$GLB,,, | Performed by: OPTOMETRIST

## 2021-11-19 PROCEDURE — 92014 COMPRE OPH EXAM EST PT 1/>: CPT | Mod: S$GLB,,, | Performed by: OPTOMETRIST

## 2021-12-06 RX ORDER — TRAZODONE HYDROCHLORIDE 50 MG/1
TABLET ORAL
Qty: 90 TABLET | Refills: 0 | Status: SHIPPED | OUTPATIENT
Start: 2021-12-06 | End: 2022-01-10 | Stop reason: SDUPTHER

## 2022-01-03 ENCOUNTER — OFFICE VISIT (OUTPATIENT)
Dept: PODIATRY | Facility: CLINIC | Age: 67
End: 2022-01-03
Payer: MEDICARE

## 2022-01-03 DIAGNOSIS — L60.1 ONYCHOLYSIS OF TOENAIL: ICD-10-CM

## 2022-01-03 DIAGNOSIS — I69.359 HEMIPARESIS FOLLOWING CEREBROVASCULAR ACCIDENT (CVA): ICD-10-CM

## 2022-01-03 DIAGNOSIS — E11.9 TYPE 2 DIABETES MELLITUS WITHOUT COMPLICATION, WITHOUT LONG-TERM CURRENT USE OF INSULIN: Primary | ICD-10-CM

## 2022-01-03 DIAGNOSIS — E11.51 TYPE II DIABETES MELLITUS WITH PERIPHERAL CIRCULATORY DISORDER: ICD-10-CM

## 2022-01-03 PROCEDURE — 99213 OFFICE O/P EST LOW 20 MIN: CPT | Mod: 25,S$GLB,, | Performed by: PODIATRIST

## 2022-01-03 PROCEDURE — 99213 PR OFFICE/OUTPT VISIT, EST, LEVL III, 20-29 MIN: ICD-10-PCS | Mod: 25,S$GLB,, | Performed by: PODIATRIST

## 2022-01-03 PROCEDURE — 11721 DEBRIDE NAIL 6 OR MORE: CPT | Mod: Q9,PBBFAC,PN | Performed by: PODIATRIST

## 2022-01-03 PROCEDURE — 99999 PR PBB SHADOW E&M-EST. PATIENT-LVL III: CPT | Mod: PBBFAC,,, | Performed by: PODIATRIST

## 2022-01-03 PROCEDURE — 11721 PR DEBRIDEMENT OF NAILS, 6 OR MORE: ICD-10-PCS | Mod: Q9,S$GLB,, | Performed by: PODIATRIST

## 2022-01-03 PROCEDURE — 99213 OFFICE O/P EST LOW 20 MIN: CPT | Mod: PBBFAC,PN,25 | Performed by: PODIATRIST

## 2022-01-03 PROCEDURE — 99999 PR PBB SHADOW E&M-EST. PATIENT-LVL III: ICD-10-PCS | Mod: PBBFAC,,, | Performed by: PODIATRIST

## 2022-01-03 PROCEDURE — 11721 DEBRIDE NAIL 6 OR MORE: CPT | Mod: Q9,S$GLB,, | Performed by: PODIATRIST

## 2022-01-03 NOTE — PROGRESS NOTES
Subjective:      Patient ID: Samuel Cast is a 66 y.o. male.    Chief Complaint: Nail Care (PCP Nallely Jurado 10-27-21; A1c 10-27-21)    Samuel is a 66 y.o. male who presents to the clinic upon referral from Dr. Evelia yang. provider found  for evaluation and treatment of diabetic feet. Samuel has a past medical history of Diabetes mellitus, type 2, Hypertension, Seizures, and Stroke. Patient relates no major problem with feet. Only complaints today consist of concern over long thick nails difficult to cut, high risk care with DM II and associated complications, also with the new year his annual diabetic foot exam today    PCP: Nallely Jurado MD    Date Last Seen by PCP: 110/27/21    Current shoe gear: Casual shoes    Hemoglobin A1C   Date Value Ref Range Status   10/27/2021 5.2 4.0 - 5.6 % Final     Comment:     ADA Screening Guidelines:  5.7-6.4%  Consistent with prediabetes  >or=6.5%  Consistent with diabetes    High levels of fetal hemoglobin interfere with the HbA1C  assay. Heterozygous hemoglobin variants (HbS, HgC, etc)do  not significantly interfere with this assay.   However, presence of multiple variants may affect accuracy.     02/01/2021 5.5 4.0 - 5.6 % Final     Comment:     ADA Screening Guidelines:  5.7-6.4%  Consistent with prediabetes  >or=6.5%  Consistent with diabetes    High levels of fetal hemoglobin interfere with the HbA1C  assay. Heterozygous hemoglobin variants (HbS, HgC, etc)do  not significantly interfere with this assay.   However, presence of multiple variants may affect accuracy.     08/05/2020 5.3 4.0 - 5.6 % Final     Comment:     ADA Screening Guidelines:  5.7-6.4%  Consistent with prediabetes  >or=6.5%  Consistent with diabetes  High levels of fetal hemoglobin interfere with the HbA1C  assay. Heterozygous hemoglobin variants (HbS, HgC, etc)do  not significantly interfere with this assay.   However, presence of multiple variants may affect accuracy.             Review of Systems    Constitutional: Negative for chills and fever.   Cardiovascular: Positive for leg swelling. Negative for claudication.   Respiratory: Negative for shortness of breath.    Skin: Positive for nail changes. Negative for itching and rash.   Musculoskeletal: Negative for muscle cramps, muscle weakness and myalgias.   Gastrointestinal: Negative for nausea and vomiting.   Neurological: Negative for focal weakness, loss of balance, numbness and paresthesias.           Objective:      Physical Exam  Constitutional:       General: He is not in acute distress.     Appearance: He is well-developed. He is not diaphoretic.   Cardiovascular:      Pulses:           Dorsalis pedis pulses are 2+ on the right side and 2+ on the left side.        Posterior tibial pulses are 2+ on the right side and 2+ on the left side.      Comments: < 3 sec capillary refill time to toes 1-5 bilateral. Toes and feet are warm to touch proximally with normal distal cooling b/l. There is no hair growth on the feet and toes b/l. There is edema b/l. No spider veins or varicosities present b/l.     Musculoskeletal:      Comments: Equinus noted b/l ankles with < 10 deg DF noted. Right sided muscle weakness to PF, DF, eversion and inversion 3-4/5. Passive range of motion of ankle and pedal joints is painless b/l.     Skin:     General: Skin is warm and dry.      Coloration: Skin is not pale.      Findings: No abrasion, bruising, burn, ecchymosis, erythema, laceration, lesion, petechiae or rash.      Nails: There is no clubbing.      Comments: Skin is thin and atrophic bilateral    Nails 1-5 bilateral are thick 3-4 mm, long 5-15 mm, and discolored with subungual debris       Neurological:      Mental Status: He is alert and oriented to person, place, and time.      Sensory: No sensory deficit.      Motor: No tremor, atrophy or abnormal muscle tone.      Comments: Negative tinel sign bilateral.   Psychiatric:         Behavior: Behavior normal.                Assessment:       Encounter Diagnoses   Name Primary?    Type 2 diabetes mellitus without complication, without long-term current use of insulin Yes    Onycholysis of toenail     Type II diabetes mellitus with peripheral circulatory disorder     Hemiparesis following cerebrovascular accident (CVA)            Plan:       Samuel was seen today for nail care.    Diagnoses and all orders for this visit:    Type 2 diabetes mellitus without complication, without long-term current use of insulin    Onycholysis of toenail    Type II diabetes mellitus with peripheral circulatory disorder    Hemiparesis following cerebrovascular accident (CVA)      I counseled the patient on his conditions, their implications and medical management.    Shoe inspection. Diabetic Foot Education. Patient reminded of the importance of good nutrition and blood sugar control to help prevent podiatric complications of diabetes. Patient instructed on proper foot hygeine. We discussed wearing proper shoe gear, daily foot inspections, never walking without protective shoe gear, never putting sharp instruments to feet     - With patient's permission, nails were aggressively reduced and debrided x 10 to their soft tissue attachment mechanically and with electric , removing all offending nail and debris. Patient relates relief following the procedure. She will continue to monitor the areas daily, inspect her feet, wear protective shoe gear when ambulatory, moisturizer to maintain skin integrity.    - Return to clinic in 4 months routine care or sooner if problems arise     Nikita Martínez DPM

## 2022-01-10 RX ORDER — METFORMIN HYDROCHLORIDE 500 MG/1
500 TABLET ORAL
Qty: 270 TABLET | Refills: 0 | Status: SHIPPED | OUTPATIENT
Start: 2022-01-10 | End: 2022-03-02 | Stop reason: SDUPTHER

## 2022-01-10 RX ORDER — TRAZODONE HYDROCHLORIDE 50 MG/1
50 TABLET ORAL NIGHTLY
Qty: 90 TABLET | Refills: 0 | Status: SHIPPED | OUTPATIENT
Start: 2022-01-10 | End: 2022-03-02 | Stop reason: SDUPTHER

## 2022-01-10 RX ORDER — GLIMEPIRIDE 4 MG/1
4 TABLET ORAL
Qty: 90 TABLET | Refills: 2 | Status: ON HOLD | OUTPATIENT
Start: 2022-01-10 | End: 2022-05-19 | Stop reason: HOSPADM

## 2022-01-10 NOTE — TELEPHONE ENCOUNTER
No new care gaps identified.  Powered by [employer]. Reference number: [id number].   1/10/2022 1:29:45 PM CST

## 2022-01-10 NOTE — TELEPHONE ENCOUNTER
No new care gaps identified.  Powered by [employer]. Reference number: [id number].   1/10/2022 1:29:10 PM CST

## 2022-01-11 RX ORDER — MECLIZINE HCL 12.5 MG 12.5 MG/1
12.5 TABLET ORAL 3 TIMES DAILY PRN
Qty: 30 TABLET | Refills: 0 | Status: SHIPPED | OUTPATIENT
Start: 2022-01-11 | End: 2022-04-26 | Stop reason: CLARIF

## 2022-01-21 ENCOUNTER — PATIENT OUTREACH (OUTPATIENT)
Dept: ADMINISTRATIVE | Facility: HOSPITAL | Age: 67
End: 2022-01-21
Payer: MEDICARE

## 2022-02-06 ENCOUNTER — PATIENT MESSAGE (OUTPATIENT)
Dept: FAMILY MEDICINE | Facility: CLINIC | Age: 67
End: 2022-02-06
Payer: MEDICARE

## 2022-02-06 DIAGNOSIS — M79.604 RIGHT LEG PAIN: Primary | ICD-10-CM

## 2022-02-09 ENCOUNTER — PATIENT MESSAGE (OUTPATIENT)
Dept: ORTHOPEDICS | Facility: CLINIC | Age: 67
End: 2022-02-09
Payer: MEDICARE

## 2022-02-10 DIAGNOSIS — M79.671 RIGHT FOOT PAIN: ICD-10-CM

## 2022-02-10 DIAGNOSIS — M25.571 ACUTE RIGHT ANKLE PAIN: Primary | ICD-10-CM

## 2022-02-11 ENCOUNTER — OFFICE VISIT (OUTPATIENT)
Dept: ORTHOPEDICS | Facility: CLINIC | Age: 67
End: 2022-02-11
Payer: MEDICARE

## 2022-02-11 ENCOUNTER — HOSPITAL ENCOUNTER (OUTPATIENT)
Dept: RADIOLOGY | Facility: HOSPITAL | Age: 67
Discharge: HOME | End: 2022-02-11
Attending: ORTHOPAEDIC SURGERY
Payer: MEDICARE

## 2022-02-11 VITALS — BODY MASS INDEX: 31.9 KG/M2 | WEIGHT: 262 LBS | HEIGHT: 76 IN

## 2022-02-11 DIAGNOSIS — M79.671 RIGHT FOOT PAIN: ICD-10-CM

## 2022-02-11 DIAGNOSIS — M25.571 ACUTE RIGHT ANKLE PAIN: ICD-10-CM

## 2022-02-11 DIAGNOSIS — M79.604 RIGHT LEG PAIN: ICD-10-CM

## 2022-02-11 PROCEDURE — 73610 X-RAY EXAM OF ANKLE: CPT | Mod: TC,PO,RT

## 2022-02-11 PROCEDURE — 99213 PR OFFICE/OUTPT VISIT, EST, LEVL III, 20-29 MIN: ICD-10-PCS | Mod: S$GLB,,, | Performed by: ORTHOPAEDIC SURGERY

## 2022-02-11 PROCEDURE — 73610 X-RAY EXAM OF ANKLE: CPT | Mod: 26,RT,, | Performed by: RADIOLOGY

## 2022-02-11 PROCEDURE — 73630 X-RAY EXAM OF FOOT: CPT | Mod: 26,RT,, | Performed by: RADIOLOGY

## 2022-02-11 PROCEDURE — 99999 PR PBB SHADOW E&M-EST. PATIENT-LVL IV: ICD-10-PCS | Mod: PBBFAC,,, | Performed by: ORTHOPAEDIC SURGERY

## 2022-02-11 PROCEDURE — 73610 XR ANKLE COMPLETE 3 VIEW RIGHT: ICD-10-PCS | Mod: 26,RT,, | Performed by: RADIOLOGY

## 2022-02-11 PROCEDURE — 73630 X-RAY EXAM OF FOOT: CPT | Mod: TC,PO,RT

## 2022-02-11 PROCEDURE — 99213 OFFICE O/P EST LOW 20 MIN: CPT | Mod: S$GLB,,, | Performed by: ORTHOPAEDIC SURGERY

## 2022-02-11 PROCEDURE — 73630 XR FOOT COMPLETE 3 VIEW RIGHT: ICD-10-PCS | Mod: 26,RT,, | Performed by: RADIOLOGY

## 2022-02-11 PROCEDURE — 99999 PR PBB SHADOW E&M-EST. PATIENT-LVL IV: CPT | Mod: PBBFAC,,, | Performed by: ORTHOPAEDIC SURGERY

## 2022-02-11 NOTE — PROGRESS NOTES
66 years old want have his right foot and ankle checked couple days ago had a fall reports have pain in the area in the ankle and midfoot region    Exam shows disuse changes from his chronic conditions no signs infection instability, nontender the calcaneus, somewhat tender in the midfoot and ankle area    X-rays are negative    Assessment:  Right midfoot sprain    Plan:  Symptomatic care reassurance, follow-up as needed    Imaging studies ordered and reviewed by me    Further History  Aching pain  Worse with activity  Relieved with rest  No other associated symptoms  No other radiation    Further Exam  Alert and oriented  Pleasant  Contralateral limb has appropriate range of motion for age and condition  Contralateral limb has appropriate strength for age and condition  Contralateral limb has appropriate stability  for age and condition  No adenopathy  Pulses are appropriate for current condition  Skin is intact        Chief Complaint    Chief Complaint   Patient presents with    Right Foot - Pain    Right Ankle - Pain       HPI  Samuel Cast is a 66 y.o.  male who presents with       Past Medical History  Past Medical History:   Diagnosis Date    Diabetes mellitus, type 2     Hypertension     Seizures     no seizure in 14 years    Stroke        Past Surgical History  Past Surgical History:   Procedure Laterality Date    COLONOSCOPY      EPIDURAL STEROID INJECTION INTO LUMBAR SPINE N/A 7/28/2021    Procedure: Injection-steroid-epidural-lumbar L5/S1;  Surgeon: Alfonso Childers MD;  Location: [facility] OR;  Service: Pain Management;  Laterality: N/A;    FIXATION KYPHOPLASTY N/A 6/30/2021    Procedure: Kyphoplasty L1, L2, L3;  Surgeon: Alfonso Childers MD;  Location: [facility] OR;  Service: Pain Management;  Laterality: N/A;    KNEE SURGERY         Medications  Current Outpatient Medications   Medication Sig    alcohol swabs (BD ALCOHOL SWABS) PadM APPLY TOPICALLY EVERY DAY    aspirin (ECOTRIN) 81 MG EC tablet Take  81 mg by mouth once daily.    blood sugar diagnostic Strp To check BG daily, to use with insurance preferred meter    co-enzyme Q-10 30 mg capsule Take 30 mg by mouth 3 (three) times daily.    glimepiride (AMARYL) 4 MG tablet TAKE 1 TABLET (4 MG TOTAL) BY MOUTH BEFORE BREAKFAST.    HYDROcodone-acetaminophen (NORCO) 5-325 mg per tablet Take 1 tablet by mouth every 4 (four) hours as needed for Pain.    lancets Misc To check BG daily, to use with insurance preferred meter    losartan (COZAAR) 100 MG tablet Take 1 tablet (100 mg total) by mouth once daily.    MAGNESIUM ORAL Take 1 tablet by mouth once daily.    metFORMIN (GLUCOPHAGE) 500 MG tablet TAKE 1 TABLET THREE TIMES DAILY WITH MEALS    metoprolol tartrate (LOPRESSOR) 50 MG tablet Take 2 tablets (100 mg total) by mouth 2 (two) times a day.    multivitamin Tab Take 1 tablet by mouth once daily.    pantoprazole (PROTONIX) 20 MG tablet Take 1 tablet (20 mg total) by mouth once daily.    phenytoin (DILANTIN) 100 MG ER capsule Take 4 capsules (400 mg total) by mouth once daily.    rosuvastatin (CRESTOR) 20 MG tablet Take 1 tablet (20 mg total) by mouth once daily.    sertraline (ZOLOFT) 100 MG tablet Take 1 tablet (100 mg total) by mouth once daily.    traZODone (DESYREL) 50 MG tablet TAKE 1 TABLET EVERY EVENING    vitamin D (VITAMIN D3) 1000 units Tab Take 1,000 Units by mouth once daily.    blood-glucose meter kit To check BG  daily, to use with insurance preferred meter    meclizine (ANTIVERT) 12.5 mg tablet Take 1 tablet (12.5 mg total) by mouth 3 (three) times daily as needed.     No current facility-administered medications for this visit.       Allergies  Review of patient's allergies indicates:  No Known Allergies    Family History  Family History   Problem Relation Age of Onset    Glaucoma Neg Hx        Social History  Social History     Socioeconomic History    Marital status:    Tobacco Use    Smoking status: Never Smoker     Smokeless tobacco: Never Used   Substance and Sexual Activity    Alcohol use: Not Currently    Drug use: Not Currently     Social Determinants of Health     Financial Resource Strain: Low Risk     Difficulty of Paying Living Expenses: Not hard at all   Food Insecurity: No Food Insecurity    Worried About Running Out of Food in the Last Year: Never true    Ran Out of Food in the Last Year: Never true   Transportation Needs: No Transportation Needs    Lack of Transportation (Medical): No    Lack of Transportation (Non-Medical): No   Physical Activity: Inactive    Days of Exercise per Week: 0 days    Minutes of Exercise per Session: 10 min   Stress: Stress Concern Present    Feeling of Stress : Rather much   Social Connections: Unknown    Frequency of Communication with Friends and Family: Once a week    Frequency of Social Gatherings with Friends and Family: Once a week    Active Member of Clubs or Organizations: No    Marital Status:                Review of Systems     Constitutional: Negative    HENT: Negative  Eyes: Negative  Respiratory: Negative  Cardiovascular: Negative  Musculoskeletal: HPI  Skin: Negative  Neurological: Negative  Hematological: Negative  Endocrine: Negative                 Physical Exam    There were no vitals filed for this visit.  Body mass index is 31.89 kg/m².  Physical Examination:     General appearance -  well appearing, and in no distress  Mental status - awake  Neck - supple  Chest -  symmetric air entry  Heart - normal rate   Abdomen - soft      Assessment     1. Right leg pain          Plan

## 2022-03-02 NOTE — TELEPHONE ENCOUNTER
Care Due:                  Date            Visit Type   Department     Provider  --------------------------------------------------------------------------------                                EP -                              PRIMARY      [facility] FAMILY  Last Visit: 10-      CARE ([facility])   MEDICINE       Nallely Jurado  Next Visit: None Scheduled  None         None Found                                                            Last  Test          Frequency    Reason                     Performed    Due Date  --------------------------------------------------------------------------------    HBA1C.......  6 months...  glimepiride, metFORMIN...  10-   04-    Lipid Panel.  12 months..  rosuvastatin.............  02- 01-    Powered by [employer]. Reference number: [id number].   3/02/2022 12:57:25 PM CST

## 2022-03-06 RX ORDER — PHENYTOIN SODIUM 100 MG/1
400 CAPSULE, EXTENDED RELEASE ORAL DAILY
Qty: 360 CAPSULE | Refills: 3 | Status: ON HOLD | OUTPATIENT
Start: 2022-03-06 | End: 2022-05-09

## 2022-03-09 RX ORDER — METFORMIN HYDROCHLORIDE 500 MG/1
500 TABLET ORAL
Qty: 270 TABLET | Refills: 0 | Status: ON HOLD | OUTPATIENT
Start: 2022-03-09 | End: 2022-05-19 | Stop reason: HOSPADM

## 2022-03-09 NOTE — TELEPHONE ENCOUNTER
Refill Routing Note   Medication(s) are not appropriate for processing by [facility] for the following reason(s):      - Outside of protocol    [facility] action(s):  Route  Approve Medication-related problems identified: Requires labs     Medication Therapy Plan: FLOS(A1C-4/25/22); Labs(Lipid Panel); Outside of Protocol(Trazodone 50mg) ; Approve( Metformin 500mg)  --->Care Gap information included in message below if applicable.   Medication reconciliation completed: No   Automatic Epic Generated Protocol Data:        Requested Prescriptions   Pending Prescriptions Disp Refills    metFORMIN (GLUCOPHAGE) 500 MG tablet 270 tablet 0     Sig: Take 1 tablet (500 mg total) by mouth 3 (three) times daily with meals.       Endocrinology:  Diabetes - Biguanides Passed - 3/9/2022  2:20 PM        Passed - Patient is at least 18 years old        Passed - Valid encounter within last 15 months     Recent Visits  Date Type Provider Dept   10/27/21 Office Visit Nallely Jurado MD [facility]   06/08/21 Office Visit Nallely Jurado MD [facility]   04/27/21 Office Visit Nallely Jurado MD [facility]   11/05/20 Office Visit Nallely Jurado MD [facility]   Showing recent visits within past 720 days and meeting all other requirements  Future Appointments  No visits were found meeting these conditions.  Showing future appointments within next 150 days and meeting all other requirements      Future Appointments              In 3 weeks Nikita Martínez, RI[facility] Reyez - [facility]                Passed - Cr is 1.39 or below and within 360 days     Lab Results   Component Value Date    CREATININE 0.7 10/27/2021    CREATININE 0.8 02/01/2021    CREATININE 0.8 08/05/2020              Passed - HBA1C within 180 days     Lab Results   Component Value Date    HGBA1C 5.2 10/27/2021    HGBA1C 5.5 02/01/2021    HGBA1C 5.3 08/05/2020              Passed - eGFR is 45 or above and within 360 days      Lab Results   Component Value Date    EGFRNONAA >60.0 10/27/2021    EGFRNONAA >60.0 02/01/2021    EGFRNONAA >60.0 08/05/2020                  traZODone (DESYREL) 50 MG tablet 90 tablet 0     Sig: TAKE 1 TABLET EVERY EVENING       Psychiatry: Antidepressants - Serotonin Modulator Passed - 3/2/2022 12:56 PM        Passed - Patient is at least 18 years old        Passed - Valid encounter within last 15 months     Recent Visits  Date Type Provider Dept   10/27/21 Office Visit Nallely Jurado MD [facility]   06/08/21 Office Visit Nallely Jurado MD [facility]   04/27/21 Office Visit Nallely Jurado MD [facility]   11/05/20 Office Visit Nallely Jurado MD [facility]   Showing recent visits within past 720 days and meeting all other requirements  Future Appointments  No visits were found meeting these conditions.  Showing future appointments within next 150 days and meeting all other requirements      Future Appointments              In 3 weeks RIYA Billy - Podiatry, [facility]                      Appointments  past 12m or future 3m with PCP    Date Provider   Last Visit   10/27/2021 Nallely Jurado MD   Next Visit   3/2/2022 Nallely Jurado MD   ED visits in past 90 days: 0        Note composed:2:35 PM 03/09/2022

## 2022-03-10 RX ORDER — TRAZODONE HYDROCHLORIDE 50 MG/1
50 TABLET ORAL NIGHTLY
Qty: 90 TABLET | Refills: 0 | Status: ON HOLD | OUTPATIENT
Start: 2022-03-10 | End: 2022-05-19 | Stop reason: HOSPADM

## 2022-03-16 ENCOUNTER — PATIENT MESSAGE (OUTPATIENT)
Dept: ADMINISTRATIVE | Facility: OTHER | Age: 67
End: 2022-03-16
Payer: MEDICARE

## 2022-03-25 ENCOUNTER — NURSE TRIAGE (OUTPATIENT)
Dept: ADMINISTRATIVE | Facility: CLINIC | Age: 67
End: 2022-03-25
Payer: MEDICARE

## 2022-03-26 NOTE — TELEPHONE ENCOUNTER
Patient's wife called to inquire about process for patient to be readmitted to an Inpatient Rehabilitation Facility. Wife states patient's history of a CVA x 15 years ago and recent rehabilitation stay late last year. Wife states that patient was discharged due to reaching plateau. Triage RN advised to discuss readmission with patient's PCP for possible referral back to inpatient rehabilitation vs Skilled Care vs Outpatient PT/OT. Secure message send to patient's PCP, Dr. Nallely Jurado for follow-up with patient's family.    Reason for Disposition   General information question, no triage required and triager able to answer question    Additional Information   Negative: RN needs further essential information from caller in order to complete triage   Negative: [1] Caller is not with the adult (patient) AND [2] reporting urgent symptoms   Negative: Lab result questions   Negative: Medication questions   Negative: Caller can't be reached by phone   Negative: Caller has already spoken to PCP or another triager   Negative: Requesting regular office appointment   Negative: Health Information question, no triage required and triager able to answer question   Negative: [1] Caller requesting NON-URGENT health information AND [2] PCP's office is the best resource    Protocols used: INFORMATION ONLY CALL - NO TRIAGE[facility]

## 2022-03-29 ENCOUNTER — PATIENT MESSAGE (OUTPATIENT)
Dept: FAMILY MEDICINE | Facility: CLINIC | Age: 67
End: 2022-03-29
Payer: MEDICARE

## 2022-03-29 ENCOUNTER — PATIENT MESSAGE (OUTPATIENT)
Dept: PODIATRY | Facility: CLINIC | Age: 67
End: 2022-03-29
Payer: MEDICARE

## 2022-03-29 DIAGNOSIS — S32.040A COMPRESSION FRACTURE OF L4 VERTEBRA, INITIAL ENCOUNTER: Primary | ICD-10-CM

## 2022-03-29 DIAGNOSIS — I69.359 HEMIPARESIS FOLLOWING CEREBROVASCULAR ACCIDENT (CVA): ICD-10-CM

## 2022-03-30 ENCOUNTER — NURSE TRIAGE (OUTPATIENT)
Dept: ADMINISTRATIVE | Facility: CLINIC | Age: 67
End: 2022-03-30
Payer: MEDICARE

## 2022-03-30 NOTE — TELEPHONE ENCOUNTER
pts wife calling for information regarding getting patient into an inpatient rehab. Informed that she would need to speak with pcp, social work and insurance. Provided with Segment phone number [phone]. Verbalized understanding.     Reason for Disposition   Nursing judgment    Protocols used: INFORMATION ONLY CALL - NO TRIAGE-[facility]

## 2022-03-31 ENCOUNTER — TELEPHONE (OUTPATIENT)
Dept: FAMILY MEDICINE | Facility: CLINIC | Age: 67
End: 2022-03-31
Payer: MEDICARE

## 2022-03-31 NOTE — TELEPHONE ENCOUNTER
----- Message from Adry Carrillo sent at 3/31/2022  1:51 PM CDT -----  Type: Needs Medical Advice  Who Called:  Mary RUDOLPH with [facility]  Best Call Back Number:   Additional Information: Calling to notify that they are in need of more information from the patient. Requesting that labs, his demographics including his social  and insurance be faxed to them at

## 2022-04-18 PROBLEM — Z91.81 AT HIGH RISK FOR FALLS: Status: ACTIVE | Noted: 2022-04-18

## 2022-04-22 ENCOUNTER — TELEPHONE (OUTPATIENT)
Dept: FAMILY MEDICINE | Facility: CLINIC | Age: 67
End: 2022-04-22
Payer: MEDICARE

## 2022-04-22 NOTE — TELEPHONE ENCOUNTER
Patient's wife states that patient needs to be in an inpatient rehab facility to be able to have PT multiple times a day. Patient can only walk about 20 steps with quad cane. Patient slipped out of bed today and wife had to call paramedics to assist with getting him back up.

## 2022-04-22 NOTE — TELEPHONE ENCOUNTER
----- Message from Cathie Radford sent at 4/22/2022  1:08 PM CDT -----  Who Called: Wife    What is the reqeust in detail: Requesting call back to discuss patient's need for inpatient rehab and therapy. Please advise.     Can the clinic reply by [facility]? No    Best Call Back Number: [phone]    Additional Information:

## 2022-04-26 ENCOUNTER — TELEPHONE (OUTPATIENT)
Dept: FAMILY MEDICINE | Facility: CLINIC | Age: 67
End: 2022-04-26
Payer: MEDICARE

## 2022-04-27 PROBLEM — I60.9 SUBARACHNOID HEMORRHAGE: Status: ACTIVE | Noted: 2022-04-27

## 2022-04-27 PROBLEM — G40.909 SEIZURE DISORDER: Status: ACTIVE | Noted: 2022-04-27

## 2022-04-28 PROBLEM — I69.359 HEMIPARESIS FOLLOWING CEREBROVASCULAR ACCIDENT (CVA): Status: RESOLVED | Noted: 2020-08-11 | Resolved: 2022-04-28

## 2022-04-29 ENCOUNTER — TELEPHONE (OUTPATIENT)
Dept: NEUROSURGERY | Facility: CLINIC | Age: 67
End: 2022-04-29
Payer: MEDICARE

## 2022-04-29 DIAGNOSIS — I60.9 SUBARACHNOID HEMORRHAGE: Primary | ICD-10-CM

## 2022-04-29 NOTE — TELEPHONE ENCOUNTER
----- Message from Lisset Etienne PA-C sent at 4/29/2022  9:15 AM CDT -----  He needs a repeat head CT in 4 weeks. Please place remind me so I can call with results

## 2022-05-01 PROBLEM — I48.91 A-FIB: Status: ACTIVE | Noted: 2022-05-01

## 2022-05-06 ENCOUNTER — TELEPHONE (OUTPATIENT)
Dept: FAMILY MEDICINE | Facility: CLINIC | Age: 67
End: 2022-05-06
Payer: MEDICARE

## 2022-05-06 NOTE — TELEPHONE ENCOUNTER
----- Message from Concepcion Fleming sent at 5/6/2022  8:57 AM CDT -----  Contact: Pt wife  Type: Needs Medical Advice    Who Called:Pt wife  Best Call Back Number:[phone]    Additional Information Requesting a call back regarding Pt wife was calling on pt behalf wife wanted to let office know that pt was still in the hospital and that they would be changing a few of his medications wife stated to lease call when available Thank you  Please Advise-Thank you

## 2022-05-06 NOTE — TELEPHONE ENCOUNTER
Spoke with patient's wife Charisse, she stated that patient is still in the hospital. He has been there for two weeks, she wanted you to be aware of medication changes.     He has been started on Keppra, as well as insulin.   Hospital is trying to set up patient being discharged to SNF.       Please discontinue Dilantin from patient's medication list.

## 2022-06-06 ENCOUNTER — TELEPHONE (OUTPATIENT)
Dept: FAMILY MEDICINE | Facility: CLINIC | Age: 67
End: 2022-06-06
Payer: MEDICARE

## 2022-06-06 NOTE — TELEPHONE ENCOUNTER
Pt wife wants to know if you can refer somewhere closer that that's [facility] for his rehab

## 2022-06-06 NOTE — TELEPHONE ENCOUNTER
----- Message from Jay Avila sent at 6/6/2022  2:36 PM CDT -----  Type:  Sooner Apoointment Request    Caller is requesting a sooner appointment.  Caller declined first available appointment listed below.  Caller will not accept being placed on the waitlist and is requesting a message be sent to doctor.  Name of Caller:Samuel Cast     When is the first available appointment?7/20/22    Symptoms:bad fall     Would the patient rather a call back or a response via [facility]? Call back     Best Call Back Number:[phone] (mobile)     Additional Information:

## 2022-06-08 NOTE — TELEPHONE ENCOUNTER
I apologize    Pt is in a rehab from discharge from [facility]. Pt's wife wants him somewhere closer like [facility] rehab.     Pt wife was going to call [facility] to see what she needs to do    She is having issues because of [facility] health insurance.        She didn't know if you could recommend somewhere

## 2022-06-09 ENCOUNTER — TELEPHONE (OUTPATIENT)
Dept: FAMILY MEDICINE | Facility: CLINIC | Age: 67
End: 2022-06-09
Payer: MEDICARE

## 2022-06-09 DIAGNOSIS — I69.359 HEMIPARESIS FOLLOWING CEREBROVASCULAR ACCIDENT (CVA): Primary | ICD-10-CM

## 2022-06-09 NOTE — TELEPHONE ENCOUNTER
Patient is in skilled nursing at [facility] in [place].  Patient wife would like a referral so that patient tcan go to [facility] rehab.

## 2022-06-09 NOTE — TELEPHONE ENCOUNTER
----- Message from Sabiha Reid, Patient Care Assistant sent at 6/9/2022  4:35 PM CDT -----  Regarding: advice  Contact: sandy guzman's wife  Type: Needs Medical Advice  Who Called:  sandy guzman's wife  Best Call Back Number: [phone] (home)     Additional Information:sandy pt's wife states she would like a callback regarding an referral for PT. Thanks!

## 2022-06-20 ENCOUNTER — PATIENT MESSAGE (OUTPATIENT)
Dept: OTHER | Facility: OTHER | Age: 67
End: 2022-06-20
Payer: MEDICARE

## 2022-07-06 ENCOUNTER — TELEPHONE (OUTPATIENT)
Dept: FAMILY MEDICINE | Facility: CLINIC | Age: 67
End: 2022-07-06
Payer: MEDICARE

## 2022-07-06 ENCOUNTER — PES CALL (OUTPATIENT)
Dept: ADMINISTRATIVE | Facility: CLINIC | Age: 67
End: 2022-07-06
Payer: MEDICARE

## 2022-07-06 NOTE — TELEPHONE ENCOUNTER
----- Message from Remedios Triana sent at 7/5/2022  3:35 PM CDT -----  Contact: Patient wife  Type:  Patient Returning Call    Who Called:Patient wife  Who Left Message for Patient:Malaika   Does the patient know what this is regarding?:needing more information   Patient been discharge from a [facility] on 07/09/2022  Would the patient rather a call back or a response via [facility]? Call back   Best Call Back Number:[phone]  Additional Information: Please assist

## 2022-07-07 ENCOUNTER — PES CALL (OUTPATIENT)
Dept: ADMINISTRATIVE | Facility: CLINIC | Age: 67
End: 2022-07-07
Payer: MEDICARE

## 2022-07-08 ENCOUNTER — PES CALL (OUTPATIENT)
Dept: ADMINISTRATIVE | Facility: CLINIC | Age: 67
End: 2022-07-08
Payer: MEDICARE

## 2022-07-12 PROCEDURE — G0180 MD CERTIFICATION HHA PATIENT: HCPCS | Mod: ,,, | Performed by: INTERNAL MEDICINE

## 2022-07-12 PROCEDURE — G0180 PR HOME HEALTH MD CERTIFICATION: ICD-10-PCS | Mod: ,,, | Performed by: INTERNAL MEDICINE

## 2022-07-13 ENCOUNTER — OFFICE VISIT (OUTPATIENT)
Dept: FAMILY MEDICINE | Facility: CLINIC | Age: 67
End: 2022-07-13
Payer: MEDICARE

## 2022-07-13 VITALS
OXYGEN SATURATION: 99 % | DIASTOLIC BLOOD PRESSURE: 60 MMHG | HEIGHT: 75 IN | HEART RATE: 66 BPM | BODY MASS INDEX: 31.94 KG/M2 | SYSTOLIC BLOOD PRESSURE: 120 MMHG

## 2022-07-13 DIAGNOSIS — I10 PRIMARY HYPERTENSION: Primary | ICD-10-CM

## 2022-07-13 DIAGNOSIS — I60.9 SUBARACHNOID HEMORRHAGE: ICD-10-CM

## 2022-07-13 DIAGNOSIS — Z91.81 AT HIGH RISK FOR FALLS: ICD-10-CM

## 2022-07-13 DIAGNOSIS — E11.9 TYPE 2 DIABETES MELLITUS WITHOUT COMPLICATION, WITHOUT LONG-TERM CURRENT USE OF INSULIN: ICD-10-CM

## 2022-07-13 PROCEDURE — 99999 PR PBB SHADOW E&M-EST. PATIENT-LVL IV: CPT | Mod: PBBFAC,,, | Performed by: NURSE PRACTITIONER

## 2022-07-13 PROCEDURE — 99214 OFFICE O/P EST MOD 30 MIN: CPT | Mod: S$GLB,,, | Performed by: NURSE PRACTITIONER

## 2022-07-13 PROCEDURE — 99999 PR PBB SHADOW E&M-EST. PATIENT-LVL IV: ICD-10-PCS | Mod: PBBFAC,,, | Performed by: NURSE PRACTITIONER

## 2022-07-13 PROCEDURE — 99214 PR OFFICE/OUTPT VISIT, EST, LEVL IV, 30-39 MIN: ICD-10-PCS | Mod: S$GLB,,, | Performed by: NURSE PRACTITIONER

## 2022-07-13 RX ORDER — LEVETIRACETAM 750 MG/1
750 TABLET ORAL 2 TIMES DAILY
Qty: 60 TABLET | Refills: 3
Start: 2022-07-13 | End: 2022-07-13

## 2022-07-13 RX ORDER — HYDRALAZINE HYDROCHLORIDE 25 MG/1
25 TABLET, FILM COATED ORAL EVERY 8 HOURS
Qty: 270 TABLET | Refills: 3 | Status: SHIPPED | OUTPATIENT
Start: 2022-07-13 | End: 2022-08-24

## 2022-07-13 RX ORDER — ATORVASTATIN CALCIUM 80 MG/1
80 TABLET, FILM COATED ORAL DAILY
Qty: 90 TABLET | Refills: 3
Start: 2022-07-13 | End: 2022-07-13

## 2022-07-13 RX ORDER — AMLODIPINE BESYLATE 10 MG/1
10 TABLET ORAL DAILY
Qty: 90 TABLET | Refills: 3 | Status: SHIPPED | OUTPATIENT
Start: 2022-07-13 | End: 2022-08-24

## 2022-07-13 RX ORDER — AMLODIPINE BESYLATE 10 MG/1
10 TABLET ORAL DAILY
Qty: 30 TABLET | Refills: 11
Start: 2022-07-13 | End: 2022-07-13

## 2022-07-13 RX ORDER — ATORVASTATIN CALCIUM 80 MG/1
80 TABLET, FILM COATED ORAL DAILY
Qty: 90 TABLET | Refills: 3 | Status: SHIPPED | OUTPATIENT
Start: 2022-07-13 | End: 2022-11-30 | Stop reason: SDUPTHER

## 2022-07-13 RX ORDER — LEVETIRACETAM 750 MG/1
750 TABLET ORAL 2 TIMES DAILY
Qty: 180 TABLET | Refills: 3 | Status: SHIPPED | OUTPATIENT
Start: 2022-07-13 | End: 2022-11-30 | Stop reason: SDUPTHER

## 2022-07-13 RX ORDER — INSULIN ASPART 100 [IU]/ML
1-10 INJECTION, SOLUTION INTRAVENOUS; SUBCUTANEOUS
Qty: 36 ML | Refills: 3 | Status: SHIPPED | OUTPATIENT
Start: 2022-07-13 | End: 2022-07-20 | Stop reason: SDUPTHER

## 2022-07-13 NOTE — PROGRESS NOTES
Subjective:       Patient ID: Samuel Cast is a 66 y.o. male.    Chief Complaint: Follow-up and Medication Refill    Patient is here for follow up, He fell at the end of April and experience a subarachnoid hemorrhage. No surgical intervention, no headache or neurological change from baseline.   He had a long hospital stay and then was in a skilled unit in a nursing home for about 6 weeks, discharged home last week, here with family member he lives with.   Has Home health, PT through home health  Medication changes reviewed, refills needed. He was changed to insulin during his hospital stay, sugars have been relatively well controlled, using sliding scale as needed, was not on insulin prior to hospitalization, has never seen endocrinology.     Past Medical History:  No date: Diabetes mellitus, type 2  No date: Hypertension  No date: Seizures      Comment:  no seizure in 14 years  No date: Stroke    Past Surgical History:  No date: COLONOSCOPY  7/28/2021: EPIDURAL STEROID INJECTION INTO LUMBAR SPINE; N/A      Comment:  Procedure: Injection-steroid-epidural-lumbar L5/S1;                 Surgeon: Alfonso Childers MD;  Location: [facility] OR;                 Service: Pain Management;  Laterality: N/A;  6/30/2021: FIXATION KYPHOPLASTY; N/A      Comment:  Procedure: Kyphoplasty L1, L2, L3;  Surgeon: Alfonso Childers MD;  Location: [facility] OR;  Service: Pain Management;                Laterality: N/A;  No date: KNEE SURGERY    Review of patient's family history indicates:  Problem: Glaucoma      Relation: Neg Hx          Age of Onset: (Not Specified)      Social History    Socioeconomic History      Marital status:     Tobacco Use      Smoking status: Never Smoker      Smokeless tobacco: Never Used    Substance and Sexual Activity      Alcohol use: Not Currently      Drug use: Not Currently      Current Outpatient Medications:  losartan (COZAAR) 100 MG tablet, Take 1 tablet (100 mg total) by mouth once daily.  (Patient taking differently: Take 100 mg by mouth every evening.), Disp: 90 tablet, Rfl: 3  metoprolol tartrate (LOPRESSOR) 50 MG tablet, Take 2 tablets (100 mg total) by mouth 2 (two) times a day., Disp: 360 tablet, Rfl: 3  pantoprazole (PROTONIX) 20 MG tablet, Take 1 tablet (20 mg total) by mouth once daily. (Patient taking differently: Take 20 mg by mouth every evening.), Disp: 90 tablet, Rfl: 3  sertraline (ZOLOFT) 100 MG tablet, Take 1 tablet (100 mg total) by mouth once daily., Disp: 90 tablet, Rfl: 3  amLODIPine (NORVASC) 10 MG tablet, Take 1 tablet (10 mg total) by mouth once daily., Disp: 90 tablet, Rfl: 3  atorvastatin (LIPITOR) 80 MG tablet, Take 1 tablet (80 mg total) by mouth once daily., Disp: 90 tablet, Rfl: 3  hydrALAZINE (APRESOLINE) 25 MG tablet, Take 1 tablet (25 mg total) by mouth every 8 (eight) hours., Disp: 270 tablet, Rfl: 3  insulin aspart U-100 (NOVOLOG) 100 unit/mL (3 mL) InPn pen, Inject 1-10 Units into the skin 4 (four) times daily with meals and nightly. 0-199 0 units 200-249 2 units 250-299 4 units 300-349 6 units 350- 399 8 units 400 or greater 10 units, Disp: 36 mL, Rfl: 3  insulin detemir U-100 (LEVEMIR FLEXTOUCH) 100 unit/mL (3 mL) SubQ InPn pen, Inject 22 Units into the skin once daily., Disp: 19.8 mL, Rfl: 3  levETIRAcetam (KEPPRA) 750 MG Tab, Take 1 tablet (750 mg total) by mouth 2 (two) times daily., Disp: 180 tablet, Rfl: 3    No current facility-administered medications for this visit.      Review of patient's allergies indicates:  No Known Allergies    Review of Systems   Constitutional: Positive for fatigue.   Eyes: Negative for visual disturbance.   Respiratory: Negative for cough and shortness of breath.    Cardiovascular: Negative for chest pain.   Genitourinary: Negative.    Neurological: Positive for weakness. Negative for dizziness and headaches.   Psychiatric/Behavioral: Negative.          Objective:      Physical Exam  Constitutional:       General: He is not in  acute distress.     Appearance: He is not toxic-appearing.   Cardiovascular:      Rate and Rhythm: Normal rate and regular rhythm.   Pulmonary:      Effort: Pulmonary effort is normal. No respiratory distress.      Breath sounds: Normal breath sounds.   Neurological:      Mental Status: He is alert. Mental status is at baseline.   Psychiatric:         Mood and Affect: Mood normal.         Behavior: Behavior normal.         Assessment:       Problem List Items Addressed This Visit        Unprioritized    Type 2 diabetes mellitus without complication, without long-term current use of insulin    Relevant Medications    insulin detemir U-100 (LEVEMIR FLEXTOUCH) 100 unit/mL (3 mL) SubQ InPn pen    insulin aspart U-100 (NOVOLOG) 100 unit/mL (3 mL) InPn pen    Other Relevant Orders    Ambulatory referral/consult to Endocrinology    HTN (hypertension) - Primary    At high risk for falls    Subarachnoid hemorrhage          Plan:     Schedule to see PCP in 3-4 weeks to check in, follow up sooner for new, worsening, or changing symptoms.   1. Primary hypertension  Stable, refill of current medications sent.     2. Type 2 diabetes mellitus without complication, without long-term current use of insulin  Continue current insulin and sliding scale, refer to endocrinology.  - Ambulatory referral/consult to Endocrinology; Future    3. Subarachnoid hemorrhage  Neurologically back to baseline (has had previous cva). Home Health and PT for strengthening.     4. At high risk for falls  PT

## 2022-07-15 ENCOUNTER — DOCUMENTATION ONLY (OUTPATIENT)
Dept: FAMILY MEDICINE | Facility: CLINIC | Age: 67
End: 2022-07-15
Payer: MEDICARE

## 2022-07-15 ENCOUNTER — PATIENT MESSAGE (OUTPATIENT)
Dept: OTHER | Facility: OTHER | Age: 67
End: 2022-07-15
Payer: MEDICARE

## 2022-07-15 NOTE — PROGRESS NOTES
PA initiated in CMM for Levemir FlexTouch 100/unit/ml  KEY:[id number]  [employer]    Denied- spoke to Karla MEDINA and Ethel Anderson NP states pt was referred to endocrinology and they will take care of assisting pt with diabetes meds.

## 2022-07-19 ENCOUNTER — PES CALL (OUTPATIENT)
Dept: ADMINISTRATIVE | Facility: CLINIC | Age: 67
End: 2022-07-19
Payer: MEDICARE

## 2022-07-19 ENCOUNTER — TELEPHONE (OUTPATIENT)
Dept: FAMILY MEDICINE | Facility: CLINIC | Age: 67
End: 2022-07-19
Payer: MEDICARE

## 2022-07-19 NOTE — TELEPHONE ENCOUNTER
Spoke to pt regarding Levemir rx. Pt aware that rx isn't covered by insurance and will speak to Endocrinologist on 07/20/2022 regarding insulin. Pt has enough insulin at this time.

## 2022-07-20 ENCOUNTER — OFFICE VISIT (OUTPATIENT)
Dept: ENDOCRINOLOGY | Facility: CLINIC | Age: 67
End: 2022-07-20
Payer: MEDICARE

## 2022-07-20 VITALS
BODY MASS INDEX: 31.77 KG/M2 | HEART RATE: 67 BPM | DIASTOLIC BLOOD PRESSURE: 70 MMHG | WEIGHT: 255.5 LBS | SYSTOLIC BLOOD PRESSURE: 100 MMHG | HEIGHT: 75 IN

## 2022-07-20 DIAGNOSIS — G40.909 SEIZURE DISORDER: ICD-10-CM

## 2022-07-20 DIAGNOSIS — E11.65 TYPE 2 DIABETES MELLITUS WITH HYPERGLYCEMIA, WITHOUT LONG-TERM CURRENT USE OF INSULIN: Primary | ICD-10-CM

## 2022-07-20 DIAGNOSIS — I10 PRIMARY HYPERTENSION: ICD-10-CM

## 2022-07-20 DIAGNOSIS — Z86.73 HISTORY OF CARDIOEMBOLIC CEREBROVASCULAR ACCIDENT (CVA): ICD-10-CM

## 2022-07-20 LAB — GLUCOSE SERPL-MCNC: 269 MG/DL (ref 70–110)

## 2022-07-20 PROCEDURE — 3074F PR MOST RECENT SYSTOLIC BLOOD PRESSURE < 130 MM HG: ICD-10-PCS | Mod: CPTII,S$GLB,, | Performed by: NURSE PRACTITIONER

## 2022-07-20 PROCEDURE — 3008F BODY MASS INDEX DOCD: CPT | Mod: CPTII,S$GLB,, | Performed by: NURSE PRACTITIONER

## 2022-07-20 PROCEDURE — 82962 POCT GLUCOSE, HAND-HELD DEVICE: ICD-10-PCS | Mod: S$GLB,,, | Performed by: NURSE PRACTITIONER

## 2022-07-20 PROCEDURE — 3072F PR LOW RISK FOR RETINOPATHY: ICD-10-PCS | Mod: CPTII,S$GLB,, | Performed by: NURSE PRACTITIONER

## 2022-07-20 PROCEDURE — 99999 PR PBB SHADOW E&M-EST. PATIENT-LVL IV: ICD-10-PCS | Mod: PBBFAC,,, | Performed by: NURSE PRACTITIONER

## 2022-07-20 PROCEDURE — 3078F DIAST BP <80 MM HG: CPT | Mod: CPTII,S$GLB,, | Performed by: NURSE PRACTITIONER

## 2022-07-20 PROCEDURE — 3074F SYST BP LT 130 MM HG: CPT | Mod: CPTII,S$GLB,, | Performed by: NURSE PRACTITIONER

## 2022-07-20 PROCEDURE — 1160F RVW MEDS BY RX/DR IN RCRD: CPT | Mod: CPTII,S$GLB,, | Performed by: NURSE PRACTITIONER

## 2022-07-20 PROCEDURE — 3044F PR MOST RECENT HEMOGLOBIN A1C LEVEL <7.0%: ICD-10-PCS | Mod: CPTII,S$GLB,, | Performed by: NURSE PRACTITIONER

## 2022-07-20 PROCEDURE — 99204 PR OFFICE/OUTPT VISIT, NEW, LEVL IV, 45-59 MIN: ICD-10-PCS | Mod: S$GLB,,, | Performed by: NURSE PRACTITIONER

## 2022-07-20 PROCEDURE — 4010F ACE/ARB THERAPY RXD/TAKEN: CPT | Mod: CPTII,S$GLB,, | Performed by: NURSE PRACTITIONER

## 2022-07-20 PROCEDURE — 1159F PR MEDICATION LIST DOCUMENTED IN MEDICAL RECORD: ICD-10-PCS | Mod: CPTII,S$GLB,, | Performed by: NURSE PRACTITIONER

## 2022-07-20 PROCEDURE — 4010F PR ACE/ARB THEARPY RXD/TAKEN: ICD-10-PCS | Mod: CPTII,S$GLB,, | Performed by: NURSE PRACTITIONER

## 2022-07-20 PROCEDURE — 99204 OFFICE O/P NEW MOD 45 MIN: CPT | Mod: S$GLB,,, | Performed by: NURSE PRACTITIONER

## 2022-07-20 PROCEDURE — 1159F MED LIST DOCD IN RCRD: CPT | Mod: CPTII,S$GLB,, | Performed by: NURSE PRACTITIONER

## 2022-07-20 PROCEDURE — 82962 GLUCOSE BLOOD TEST: CPT | Mod: S$GLB,,, | Performed by: NURSE PRACTITIONER

## 2022-07-20 PROCEDURE — 3044F HG A1C LEVEL LT 7.0%: CPT | Mod: CPTII,S$GLB,, | Performed by: NURSE PRACTITIONER

## 2022-07-20 PROCEDURE — 3008F PR BODY MASS INDEX (BMI) DOCUMENTED: ICD-10-PCS | Mod: CPTII,S$GLB,, | Performed by: NURSE PRACTITIONER

## 2022-07-20 PROCEDURE — 99999 PR PBB SHADOW E&M-EST. PATIENT-LVL IV: CPT | Mod: PBBFAC,,, | Performed by: NURSE PRACTITIONER

## 2022-07-20 PROCEDURE — 3078F PR MOST RECENT DIASTOLIC BLOOD PRESSURE < 80 MM HG: ICD-10-PCS | Mod: CPTII,S$GLB,, | Performed by: NURSE PRACTITIONER

## 2022-07-20 PROCEDURE — 1160F PR REVIEW ALL MEDS BY PRESCRIBER/CLIN PHARMACIST DOCUMENTED: ICD-10-PCS | Mod: CPTII,S$GLB,, | Performed by: NURSE PRACTITIONER

## 2022-07-20 PROCEDURE — 3072F LOW RISK FOR RETINOPATHY: CPT | Mod: CPTII,S$GLB,, | Performed by: NURSE PRACTITIONER

## 2022-07-20 RX ORDER — INSULIN GLARGINE 300 U/ML
30 INJECTION, SOLUTION SUBCUTANEOUS NIGHTLY
Qty: 3 PEN | Refills: 6 | Status: SHIPPED | OUTPATIENT
Start: 2022-07-20 | End: 2022-08-17

## 2022-07-20 RX ORDER — INSULIN ASPART 100 [IU]/ML
10 INJECTION, SOLUTION INTRAVENOUS; SUBCUTANEOUS
Qty: 27 ML | Refills: 3 | Status: SHIPPED | OUTPATIENT
Start: 2022-07-20 | End: 2022-08-17

## 2022-07-20 NOTE — PROGRESS NOTES
CC: Mr. Samuel Cast arrives today for management of Type 2 DM and review of chronic medical conditions, as listed in the Visit Diagnosis section of this encounter.       HPI: Mr. Samuel Cast was diagnosed with Type 2 DM in 2007, during workup of stroke. Initial treatment consisted of metformin, glimepiride. Denies FH of DM. Denies hospitalizations due to DM.   Of note, he has R hemiplegia from past CVA, as well as aphasia.     This is his first time being seen in endocrine.     He is accompanied by his wife, Charisse, who contributes to most of the history.     He fell at the end of April and experience a subarachnoid hemorrhage. No surgical intervention or neurological change from baseline. He had a long hospital stay and then was in a skilled unit in a nursing home for about 6 weeks, discharged home in early July. While in the hospital, he was placed on insulin. He was discharged with insulin.     HH comes twice/week.     BG readings are checked 3x/day and readings range from 260 to 390 in meter.    Hypoglycemia: No    Missing Insulin/PO medication doses: No  Timing prandial insulin 5-15 minutes before meals: yes    Exercise: Yes, home PT 2x/week    Dietary Habits: Eats 3 meals/day. Snacking on SF jello, popsicles, fruit. Avoids sugary beverages. .    Last DM education appointment:      CURRENT DIABETIC MEDS: Levemir 22 units QHS, Novolog correctrion scale, target 200, ISF 25  Vial or pen: pen  Glucometer type: Relion    Previous DM treatments:  Metformin   Glimepiride     Last Eye Exam: 11/2021, no DR  Last Podiatry Exam: 1/2022    REVIEW OF SYSTEMS  Constitutional: no c/o fatigue, weakness, or weight loss.   Cardiac: no palpitations or chest pain.  Respiratory: no cough or dyspnea.  GI: no c/o abdominal pain or nausea, denies h/o pancreatitis.   Skin: no lesions or rashes.  Neuro: no numbness, tingling, or parasthesias.  Endocrine: denies polyphagia, polydipsia, polyuria      Personally reviewed Past Medical,  "Surgical, Social History.    Vital Signs  /70   Pulse 67   Ht 6' 3" (1.905 m)   Wt 115.9 kg (255 lb 8.2 oz)   BMI 31.94 kg/m²     Personally reviewed the below labs:    Hemoglobin A1C   Date Value Ref Range Status   04/27/2022 5.4 0.0 - 5.6 % Final     Comment:     Reference Interval:  5.0 - 5.6 Normal   5.7 - 6.4 High Risk   > 6.5 Diabetic      Hgb A1c results are standardized based on the (NGSP) National   Glycohemoglobin Standardization Program.      Hemoglobin A1C levels are related to mean serum/plasma glucose   during the preceding 2-3 months.        10/27/2021 5.2 4.0 - 5.6 % Final     Comment:     ADA Screening Guidelines:  5.7-6.4%  Consistent with prediabetes  >or=6.5%  Consistent with diabetes    High levels of fetal hemoglobin interfere with the HbA1C  assay. Heterozygous hemoglobin variants (HbS, HgC, etc)do  not significantly interfere with this assay.   However, presence of multiple variants may affect accuracy.     02/01/2021 5.5 4.0 - 5.6 % Final     Comment:     ADA Screening Guidelines:  5.7-6.4%  Consistent with prediabetes  >or=6.5%  Consistent with diabetes    High levels of fetal hemoglobin interfere with the HbA1C  assay. Heterozygous hemoglobin variants (HbS, HgC, etc)do  not significantly interfere with this assay.   However, presence of multiple variants may affect accuracy.         Chemistry        Component Value Date/Time     05/15/2022 0757    K 4.1 05/15/2022 0757     05/15/2022 0757    CO2 25 05/15/2022 0757    BUN 16 05/15/2022 0757    CREATININE 0.53 05/15/2022 0757     (H) 05/15/2022 0757        Component Value Date/Time    CALCIUM 8.7 05/15/2022 0757    ALKPHOS 86 04/26/2022 1610    AST 50 04/26/2022 1610    ALT 50 04/26/2022 1610    BILITOT 0.4 04/26/2022 1610    ESTGFRAFRICA >60 05/15/2022 0757    EGFRNONAA >60 05/15/2022 0757          Lab Results   Component Value Date    CHOL 178 04/27/2022    CHOL 161 02/01/2021    CHOL 177 11/02/2019     Lab " Results   Component Value Date    HDL 41 04/27/2022    HDL 32 (L) 02/01/2021    HDL 43 11/02/2019     Lab Results   Component Value Date    LDLCALC 104.0 04/27/2022    LDLCALC 74.2 02/01/2021    LDLCALC 103.2 11/02/2019     Lab Results   Component Value Date    TRIG 165 (H) 04/27/2022    TRIG 274 (H) 02/01/2021    TRIG 154 (H) 11/02/2019     Lab Results   Component Value Date    CHOLHDL 23.0 04/27/2022    CHOLHDL 19.9 (L) 02/01/2021    CHOLHDL 24.3 11/02/2019       Lab Results   Component Value Date    MICALBCREAT 3.1 10/28/2021     Lab Results   Component Value Date    TSH 2.480 04/27/2022       CrCl cannot be calculated (Patient's most recent lab result is older than the maximum 7 days allowed.).    No results found for: [id number]      PHYSICAL EXAMINATION  Constitutional: Appears well, no distress, in wheelchair  Neck: Supple, trachea midline.  Respiratory: CTA, even and unlabored.  Cardiovascular: RRR, no murmurs, no carotid bruits.   GI: bowel sounds active, no hernia noted  Skin: warm and dry; no visible wounds  Neuro: expressive aphasia noted.   Feet: appropriate footwear.     Goals       80 <= Glucose <= 180 (pt-stated)       Blood Pressure < 130/80 (pt-stated)       Exercise at least 150 minutes per week.       HEMOGLOBIN A1C < 8.0       Take at least one BP reading per week at various times of the day             Assessment/Plan  1. Type 2 diabetes mellitus with hyperglycemia, without long-term current use of insulin  -- Uncontrolled. Glucoses ranging mid 200-300s. Not at all correlating with late April A1c. No profound anemia noted so I'm unsure as to the cause of the significant change between then and now. Do not need tight control, due to high risk of falls and comorbidities.   -- weight based pt 0.5 u/kg. Change Levemir to Toujeo and increase to 30 units nightly  -- add Novolog base dose, 10 units with meals. Change correction scale, target 180, ISF 25.   -- recommended personal CGMS. Will  send order in for Dexcom G6 through DME.    -- Patient currently manages Type 2 diabetes mellitus with an intensive insulin regimen consisting of 4 insulin injections/day. The patient has been using SMBG for frequent glucose monitoring. The patient requires a therapeutic CGM and is willing to use the CGM for the necessary frequent self-adjustments of insulin therapy. This is a medical necessity. I will continue to have in-person visits every 3 months to assess adherence to his CGM regimen and diabetes treatment plan.     -- Discussed diagnosis of DM, A1c goals, progression of disease, long term complications and tx options.    -- Reviewed hypoglycemia management: treat with 4 oz of juice, 4 oz regular soda, or 4 glucose tablets. Monitor and repeat treatment every 15 minutes until BG is >70 Then have a snack, which includes 15 grams of complex carbohydrates and protein.     -- takes statin, ARB   2. Primary hypertension  -- controlled   -- continue current meds   3. History of cardioembolic cerebrovascular accident (CVA)  -- w/R sided hemiplegia  -- avoid hypoglycemia.    4. Seizure disorder  -- avoid hypoglycemia       FOLLOW UP  Follow up in about 4 weeks (around 8/17/2022).   Patient instructed to bring BG logs to each follow up   Patient encouraged to call for any BG/medication issues, concerns, or questions.    Orders Placed This Encounter   Procedures    Microalbumin/Creatinine Ratio, Urine    Hemoglobin A1C    Comprehensive Metabolic Panel    POCT Glucose, Hand-Held Device

## 2022-07-21 ENCOUNTER — TELEPHONE (OUTPATIENT)
Dept: ENDOCRINOLOGY | Facility: CLINIC | Age: 67
End: 2022-07-21
Payer: MEDICARE

## 2022-08-02 ENCOUNTER — EXTERNAL HOME HEALTH (OUTPATIENT)
Dept: HOME HEALTH SERVICES | Facility: HOSPITAL | Age: 67
End: 2022-08-02
Payer: MEDICARE

## 2022-08-03 ENCOUNTER — OFFICE VISIT (OUTPATIENT)
Dept: FAMILY MEDICINE | Facility: CLINIC | Age: 67
End: 2022-08-03
Payer: MEDICARE

## 2022-08-03 VITALS
WEIGHT: 255.5 LBS | OXYGEN SATURATION: 97 % | DIASTOLIC BLOOD PRESSURE: 78 MMHG | SYSTOLIC BLOOD PRESSURE: 120 MMHG | HEART RATE: 60 BPM | BODY MASS INDEX: 31.94 KG/M2

## 2022-08-03 DIAGNOSIS — I60.9 SUBARACHNOID HEMORRHAGE: Primary | ICD-10-CM

## 2022-08-03 DIAGNOSIS — I10 PRIMARY HYPERTENSION: ICD-10-CM

## 2022-08-03 DIAGNOSIS — M21.379 FOOT-DROP, UNSPECIFIED LATERALITY: ICD-10-CM

## 2022-08-03 DIAGNOSIS — E11.9 TYPE 2 DIABETES MELLITUS WITHOUT COMPLICATION, WITHOUT LONG-TERM CURRENT USE OF INSULIN: ICD-10-CM

## 2022-08-03 PROCEDURE — 99214 PR OFFICE/OUTPT VISIT, EST, LEVL IV, 30-39 MIN: ICD-10-PCS | Mod: S$GLB,,, | Performed by: INTERNAL MEDICINE

## 2022-08-03 PROCEDURE — 3044F PR MOST RECENT HEMOGLOBIN A1C LEVEL <7.0%: ICD-10-PCS | Mod: CPTII,S$GLB,, | Performed by: INTERNAL MEDICINE

## 2022-08-03 PROCEDURE — 99999 PR PBB SHADOW E&M-EST. PATIENT-LVL III: ICD-10-PCS | Mod: PBBFAC,,, | Performed by: INTERNAL MEDICINE

## 2022-08-03 PROCEDURE — 3074F SYST BP LT 130 MM HG: CPT | Mod: CPTII,S$GLB,, | Performed by: INTERNAL MEDICINE

## 2022-08-03 PROCEDURE — 3074F PR MOST RECENT SYSTOLIC BLOOD PRESSURE < 130 MM HG: ICD-10-PCS | Mod: CPTII,S$GLB,, | Performed by: INTERNAL MEDICINE

## 2022-08-03 PROCEDURE — 3044F HG A1C LEVEL LT 7.0%: CPT | Mod: CPTII,S$GLB,, | Performed by: INTERNAL MEDICINE

## 2022-08-03 PROCEDURE — 3008F BODY MASS INDEX DOCD: CPT | Mod: CPTII,S$GLB,, | Performed by: INTERNAL MEDICINE

## 2022-08-03 PROCEDURE — 3078F PR MOST RECENT DIASTOLIC BLOOD PRESSURE < 80 MM HG: ICD-10-PCS | Mod: CPTII,S$GLB,, | Performed by: INTERNAL MEDICINE

## 2022-08-03 PROCEDURE — 4010F ACE/ARB THERAPY RXD/TAKEN: CPT | Mod: CPTII,S$GLB,, | Performed by: INTERNAL MEDICINE

## 2022-08-03 PROCEDURE — 3072F PR LOW RISK FOR RETINOPATHY: ICD-10-PCS | Mod: CPTII,S$GLB,, | Performed by: INTERNAL MEDICINE

## 2022-08-03 PROCEDURE — 1159F MED LIST DOCD IN RCRD: CPT | Mod: CPTII,S$GLB,, | Performed by: INTERNAL MEDICINE

## 2022-08-03 PROCEDURE — 3072F LOW RISK FOR RETINOPATHY: CPT | Mod: CPTII,S$GLB,, | Performed by: INTERNAL MEDICINE

## 2022-08-03 PROCEDURE — 3008F PR BODY MASS INDEX (BMI) DOCUMENTED: ICD-10-PCS | Mod: CPTII,S$GLB,, | Performed by: INTERNAL MEDICINE

## 2022-08-03 PROCEDURE — 99999 PR PBB SHADOW E&M-EST. PATIENT-LVL III: CPT | Mod: PBBFAC,,, | Performed by: INTERNAL MEDICINE

## 2022-08-03 PROCEDURE — 1159F PR MEDICATION LIST DOCUMENTED IN MEDICAL RECORD: ICD-10-PCS | Mod: CPTII,S$GLB,, | Performed by: INTERNAL MEDICINE

## 2022-08-03 PROCEDURE — 4010F PR ACE/ARB THEARPY RXD/TAKEN: ICD-10-PCS | Mod: CPTII,S$GLB,, | Performed by: INTERNAL MEDICINE

## 2022-08-03 PROCEDURE — 3078F DIAST BP <80 MM HG: CPT | Mod: CPTII,S$GLB,, | Performed by: INTERNAL MEDICINE

## 2022-08-03 PROCEDURE — 99214 OFFICE O/P EST MOD 30 MIN: CPT | Mod: S$GLB,,, | Performed by: INTERNAL MEDICINE

## 2022-08-03 RX ORDER — BUTALBITAL, ACETAMINOPHEN AND CAFFEINE 50; 325; 40 MG/1; MG/1; MG/1
1 TABLET ORAL EVERY 6 HOURS PRN
Qty: 30 TABLET | Refills: 0 | Status: SHIPPED | OUTPATIENT
Start: 2022-08-03 | End: 2022-09-02

## 2022-08-03 NOTE — PROGRESS NOTES
Subjective:       Patient ID: Samuel Cast is a 66 y.o. male.    Chief Complaint: Follow-up    Pt here for check up    DM- - following with endo now. On ssi and 10 with meals and also 40 units toujeuo.    htn-stable on medication  gerd- stable on protonix    Review of Systems   Constitutional: Negative for fatigue and fever.   Respiratory: Negative for cough and shortness of breath.    Cardiovascular: Negative for chest pain and palpitations.   Gastrointestinal: Negative for abdominal pain.   Musculoskeletal: Negative for back pain.   Neurological: Negative for headaches.         Objective:      Physical Exam  Constitutional:       Appearance: Normal appearance.   HENT:      Head: Normocephalic.   Cardiovascular:      Rate and Rhythm: Normal rate and regular rhythm.   Pulmonary:      Effort: Pulmonary effort is normal.      Breath sounds: Normal breath sounds.   Musculoskeletal:         General: Normal range of motion.      Cervical back: Normal range of motion.   Neurological:      General: No focal deficit present.      Mental Status: He is alert.   Psychiatric:         Mood and Affect: Mood normal.         Behavior: Behavior normal.         Assessment:       Problem List Items Addressed This Visit        Neuro    Subarachnoid hemorrhage - Primary       Cardiac/Vascular    HTN (hypertension)       Endocrine    Type 2 diabetes mellitus without complication, without long-term current use of insulin      Other Visit Diagnoses     Foot-drop, unspecified laterality        Relevant Orders    ANKLE FOOT ORTHOSIS FOR HOME USE          Plan:       htn- stable  Dm- following with endo, has appt nextweek  cva- undergonit PT

## 2022-08-10 ENCOUNTER — LAB VISIT (OUTPATIENT)
Dept: LAB | Facility: HOSPITAL | Age: 67
End: 2022-08-10
Attending: NURSE PRACTITIONER
Payer: MEDICARE

## 2022-08-10 DIAGNOSIS — E11.65 TYPE 2 DIABETES MELLITUS WITH HYPERGLYCEMIA, WITHOUT LONG-TERM CURRENT USE OF INSULIN: ICD-10-CM

## 2022-08-10 LAB
ALBUMIN/CREAT UR: 7.1 UG/MG (ref 0–30)
CREAT UR-MCNC: 324 MG/DL (ref 23–375)
MICROALBUMIN UR DL<=1MG/L-MCNC: 23 UG/ML

## 2022-08-10 PROCEDURE — 82570 ASSAY OF URINE CREATININE: CPT | Performed by: NURSE PRACTITIONER

## 2022-08-10 PROCEDURE — 82043 UR ALBUMIN QUANTITATIVE: CPT | Performed by: NURSE PRACTITIONER

## 2022-08-17 ENCOUNTER — OFFICE VISIT (OUTPATIENT)
Dept: ENDOCRINOLOGY | Facility: CLINIC | Age: 67
End: 2022-08-17
Payer: MEDICARE

## 2022-08-17 ENCOUNTER — TELEPHONE (OUTPATIENT)
Dept: ENDOCRINOLOGY | Facility: CLINIC | Age: 67
End: 2022-08-17

## 2022-08-17 VITALS
BODY MASS INDEX: 31.94 KG/M2 | HEART RATE: 63 BPM | DIASTOLIC BLOOD PRESSURE: 74 MMHG | SYSTOLIC BLOOD PRESSURE: 118 MMHG | HEIGHT: 75 IN

## 2022-08-17 DIAGNOSIS — I10 PRIMARY HYPERTENSION: ICD-10-CM

## 2022-08-17 DIAGNOSIS — G40.909 SEIZURE DISORDER: ICD-10-CM

## 2022-08-17 DIAGNOSIS — E11.65 TYPE 2 DIABETES MELLITUS WITH HYPERGLYCEMIA, WITHOUT LONG-TERM CURRENT USE OF INSULIN: Primary | ICD-10-CM

## 2022-08-17 DIAGNOSIS — Z86.73 HISTORY OF CARDIOEMBOLIC CEREBROVASCULAR ACCIDENT (CVA): ICD-10-CM

## 2022-08-17 PROCEDURE — 3078F PR MOST RECENT DIASTOLIC BLOOD PRESSURE < 80 MM HG: ICD-10-PCS | Mod: CPTII,S$GLB,, | Performed by: NURSE PRACTITIONER

## 2022-08-17 PROCEDURE — 3052F PR MOST RECENT HEMOGLOBIN A1C LEVEL 8.0 - < 9.0%: ICD-10-PCS | Mod: CPTII,S$GLB,, | Performed by: NURSE PRACTITIONER

## 2022-08-17 PROCEDURE — 99214 PR OFFICE/OUTPT VISIT, EST, LEVL IV, 30-39 MIN: ICD-10-PCS | Mod: S$GLB,,, | Performed by: NURSE PRACTITIONER

## 2022-08-17 PROCEDURE — 1160F PR REVIEW ALL MEDS BY PRESCRIBER/CLIN PHARMACIST DOCUMENTED: ICD-10-PCS | Mod: CPTII,S$GLB,, | Performed by: NURSE PRACTITIONER

## 2022-08-17 PROCEDURE — 3066F PR DOCUMENTATION OF TREATMENT FOR NEPHROPATHY: ICD-10-PCS | Mod: CPTII,S$GLB,, | Performed by: NURSE PRACTITIONER

## 2022-08-17 PROCEDURE — 3052F HG A1C>EQUAL 8.0%<EQUAL 9.0%: CPT | Mod: CPTII,S$GLB,, | Performed by: NURSE PRACTITIONER

## 2022-08-17 PROCEDURE — 3074F SYST BP LT 130 MM HG: CPT | Mod: CPTII,S$GLB,, | Performed by: NURSE PRACTITIONER

## 2022-08-17 PROCEDURE — 3078F DIAST BP <80 MM HG: CPT | Mod: CPTII,S$GLB,, | Performed by: NURSE PRACTITIONER

## 2022-08-17 PROCEDURE — 99214 OFFICE O/P EST MOD 30 MIN: CPT | Mod: S$GLB,,, | Performed by: NURSE PRACTITIONER

## 2022-08-17 PROCEDURE — 4010F ACE/ARB THERAPY RXD/TAKEN: CPT | Mod: CPTII,S$GLB,, | Performed by: NURSE PRACTITIONER

## 2022-08-17 PROCEDURE — 3008F PR BODY MASS INDEX (BMI) DOCUMENTED: ICD-10-PCS | Mod: CPTII,S$GLB,, | Performed by: NURSE PRACTITIONER

## 2022-08-17 PROCEDURE — 3061F PR NEG MICROALBUMINURIA RESULT DOCUMENTED/REVIEW: ICD-10-PCS | Mod: CPTII,S$GLB,, | Performed by: NURSE PRACTITIONER

## 2022-08-17 PROCEDURE — 3008F BODY MASS INDEX DOCD: CPT | Mod: CPTII,S$GLB,, | Performed by: NURSE PRACTITIONER

## 2022-08-17 PROCEDURE — 1160F RVW MEDS BY RX/DR IN RCRD: CPT | Mod: CPTII,S$GLB,, | Performed by: NURSE PRACTITIONER

## 2022-08-17 PROCEDURE — 99999 PR PBB SHADOW E&M-EST. PATIENT-LVL IV: ICD-10-PCS | Mod: PBBFAC,,, | Performed by: NURSE PRACTITIONER

## 2022-08-17 PROCEDURE — 99999 PR PBB SHADOW E&M-EST. PATIENT-LVL IV: CPT | Mod: PBBFAC,,, | Performed by: NURSE PRACTITIONER

## 2022-08-17 PROCEDURE — 3061F NEG MICROALBUMINURIA REV: CPT | Mod: CPTII,S$GLB,, | Performed by: NURSE PRACTITIONER

## 2022-08-17 PROCEDURE — 3074F PR MOST RECENT SYSTOLIC BLOOD PRESSURE < 130 MM HG: ICD-10-PCS | Mod: CPTII,S$GLB,, | Performed by: NURSE PRACTITIONER

## 2022-08-17 PROCEDURE — 3072F PR LOW RISK FOR RETINOPATHY: ICD-10-PCS | Mod: CPTII,S$GLB,, | Performed by: NURSE PRACTITIONER

## 2022-08-17 PROCEDURE — 3072F LOW RISK FOR RETINOPATHY: CPT | Mod: CPTII,S$GLB,, | Performed by: NURSE PRACTITIONER

## 2022-08-17 PROCEDURE — 1159F PR MEDICATION LIST DOCUMENTED IN MEDICAL RECORD: ICD-10-PCS | Mod: CPTII,S$GLB,, | Performed by: NURSE PRACTITIONER

## 2022-08-17 PROCEDURE — 1159F MED LIST DOCD IN RCRD: CPT | Mod: CPTII,S$GLB,, | Performed by: NURSE PRACTITIONER

## 2022-08-17 PROCEDURE — 3066F NEPHROPATHY DOC TX: CPT | Mod: CPTII,S$GLB,, | Performed by: NURSE PRACTITIONER

## 2022-08-17 PROCEDURE — 4010F PR ACE/ARB THEARPY RXD/TAKEN: ICD-10-PCS | Mod: CPTII,S$GLB,, | Performed by: NURSE PRACTITIONER

## 2022-08-17 RX ORDER — METFORMIN HYDROCHLORIDE 500 MG/1
500 TABLET ORAL 2 TIMES DAILY WITH MEALS
Refills: 0
Start: 2022-08-17 | End: 2022-10-20

## 2022-08-17 RX ORDER — INSULIN GLARGINE 300 U/ML
34 INJECTION, SOLUTION SUBCUTANEOUS NIGHTLY
Qty: 3 PEN | Refills: 6
Start: 2022-08-17 | End: 2022-10-20 | Stop reason: SDUPTHER

## 2022-08-17 RX ORDER — INSULIN ASPART 100 [IU]/ML
12 INJECTION, SOLUTION INTRAVENOUS; SUBCUTANEOUS
Qty: 32.4 ML | Refills: 3
Start: 2022-08-17 | End: 2022-08-19

## 2022-08-17 NOTE — TELEPHONE ENCOUNTER
Please fax all paperwork to [employer] to start ordering process. Italia ladd/[employer] said she never received original fax

## 2022-08-17 NOTE — PATIENT INSTRUCTIONS
Resume metformin 500 mg. Take 1 tablet with breakfast for the first week. Then increase to 1 tablet twice daily (breakfast and dinner).    Increase Toujeo to 34 units.     Increase Novolog to 12 units + sliding scale.

## 2022-08-17 NOTE — PROGRESS NOTES
CC: Mr. Samuel Cast arrives today for management of Type 2 DM and review of chronic medical conditions, as listed in the Visit Diagnosis section of this encounter.       HPI: Mr. Samuel Cast was diagnosed with Type 2 DM in 2007, during workup of stroke. Initial treatment consisted of metformin, glimepiride. Insulin added in 2022, following hospital stay after subarachnoid hemorrhage. Denies FH of DM. Denies hospitalizations due to DM.   Of note, he has R hemiplegia from past CVA, as well as aphasia.     He is accompanied by his wife, Charisse, who contributes to most of the history.     Patient presents for 4 week log review after initiating Novolog base dose with meals and changing Levemir to Toujeo. Dexcom G6 was ordered but they haven't received.     At initial visit, glucoses were 200-300s.     BG readings are checked 4x/day.            Hypoglycemia: No    Missing Insulin/PO medication doses: Yes - wife has been holding insulin when pre-meal glucose < 180  Timing prandial insulin 5-15 minutes before meals: yes    Exercise: Yes, home PT 2x/week    Dietary Habits: Eats 3 meals/day. Occasional snacking after dinner on SF jello, SF popsicles. Avoids sugary beverages.     Last DM education appointment:      CURRENT DIABETIC MEDS: Toujeo 30 units QHS, Novolog 10 units AC + correction scale, target 180, ISF 25  Vial or pen: pen  Glucometer type: Relion    Previous DM treatments:  Metformin   Glimepiride     Last Eye Exam: 11/2021, no DR  Last Podiatry Exam: 1/2022    REVIEW OF SYSTEMS  Constitutional: no c/o weight loss. + fatigue, weakness. In wheelchair.  Cardiac: no palpitations or chest pain.  Respiratory: no cough or dyspnea.  GI: no c/o abdominal pain or nausea. Denies h/o pancreatitis.   Skin: no lesions or rashes.  Neuro: no numbness, tingling, or parasthesias.  Endocrine: denies polyphagia, polydipsia, polyuria      Personally reviewed Past Medical, Surgical, Social History.    Vital Signs  /74   Pulse  "63   Ht 6' 3" (1.905 m)   BMI 31.94 kg/m²     Personally reviewed the below labs:    Hemoglobin A1C   Date Value Ref Range Status   08/10/2022 8.6 (H) 4.0 - 5.6 % Final     Comment:     ADA Screening Guidelines:  5.7-6.4%  Consistent with prediabetes  >or=6.5%  Consistent with diabetes    High levels of fetal hemoglobin interfere with the HbA1C  assay. Heterozygous hemoglobin variants (HbS, HgC, etc)do  not significantly interfere with this assay.   However, presence of multiple variants may affect accuracy.     04/27/2022 5.4 0.0 - 5.6 % Final     Comment:     Reference Interval:  5.0 - 5.6 Normal   5.7 - 6.4 High Risk   > 6.5 Diabetic      Hgb A1c results are standardized based on the (NGSP) National   Glycohemoglobin Standardization Program.      Hemoglobin A1C levels are related to mean serum/plasma glucose   during the preceding 2-3 months.        10/27/2021 5.2 4.0 - 5.6 % Final     Comment:     ADA Screening Guidelines:  5.7-6.4%  Consistent with prediabetes  >or=6.5%  Consistent with diabetes    High levels of fetal hemoglobin interfere with the HbA1C  assay. Heterozygous hemoglobin variants (HbS, HgC, etc)do  not significantly interfere with this assay.   However, presence of multiple variants may affect accuracy.         Chemistry        Component Value Date/Time     08/10/2022 1006    K 3.9 08/10/2022 1006     08/10/2022 1006    CO2 24 08/10/2022 1006    BUN 12 08/10/2022 1006    CREATININE 0.9 08/10/2022 1006     (H) 08/10/2022 1006        Component Value Date/Time    CALCIUM 9.6 08/10/2022 1006    ALKPHOS 104 08/10/2022 1006    AST 15 08/10/2022 1006    ALT 20 08/10/2022 1006    BILITOT 1.1 (H) 08/10/2022 1006    ESTGFRAFRICA >60 05/15/2022 0757    EGFRNONAA >60 05/15/2022 0757          Lab Results   Component Value Date    CHOL 178 04/27/2022    CHOL 161 02/01/2021    CHOL 177 11/02/2019     Lab Results   Component Value Date    HDL 41 04/27/2022    HDL 32 (L) 02/01/2021    HDL 43 " 11/02/2019     Lab Results   Component Value Date    LDLCALC 104.0 04/27/2022    LDLCALC 74.2 02/01/2021    LDLCALC 103.2 11/02/2019     Lab Results   Component Value Date    TRIG 165 (H) 04/27/2022    TRIG 274 (H) 02/01/2021    TRIG 154 (H) 11/02/2019     Lab Results   Component Value Date    CHOLHDL 23.0 04/27/2022    CHOLHDL 19.9 (L) 02/01/2021    CHOLHDL 24.3 11/02/2019       Lab Results   Component Value Date    MICALBCREAT 7.1 08/10/2022     Lab Results   Component Value Date    TSH 2.480 04/27/2022       CrCl cannot be calculated (Patient's most recent lab result is older than the maximum 7 days allowed.).    No results found for: [id number]      PHYSICAL EXAMINATION  Deferred      Goals       80 <= Glucose <= 180 (pt-stated)       Blood Pressure < 130/80 (pt-stated)       Exercise at least 150 minutes per week.       HEMOGLOBIN A1C < 8.0       Take at least one BP reading per week at various times of the day             Assessment/Plan  1. Type 2 diabetes mellitus with hyperglycemia, without long-term current use of insulin  -- Uncontrolled but improving. Do not need tight control, due to high risk of falls and comorbidities. Explained that if pre-meal glucose < 180, he still needs base Novolog dose.   -- resume low dose metformin. Take 500 mg BID. May be able to increase in the future and possibly add GLP-1RA (if cost effective) and wean prandial insulin.  -- increase Toujeo to 34 units nightly  -- increase Novolog to 12 units with meals + correction scale, target 180, ISF 25.   -- will contact [facility] to check order status    -- Patient currently manages Type 2 diabetes mellitus with an intensive insulin regimen consisting of 4 insulin injections/day. The patient has been using SMBG for frequent glucose monitoring. The patient requires a therapeutic CGM and is willing to use the CGM for the necessary frequent self-adjustments of insulin therapy. This is a medical necessity. I will continue to  have in-person visits every 3 months to assess adherence to his CGM regimen and diabetes treatment plan.     -- Discussed diagnosis of DM, A1c goals, progression of disease, long term complications and tx options.    -- Reviewed hypoglycemia management: treat with 4 oz of juice, 4 oz regular soda, or 4 glucose tablets. Monitor and repeat treatment every 15 minutes until BG is >70 Then have a snack, which includes 15 grams of complex carbohydrates and protein.     -- takes statin, ARB   2. Primary hypertension  -- controlled   -- continue current meds   3. History of cardioembolic cerebrovascular accident (CVA)  -- w/R sided hemiplegia  -- avoid hypoglycemia.    4. Seizure disorder  -- avoid hypoglycemia       FOLLOW UP  Follow up in about 2 months (around 10/17/2022).   Patient instructed to bring BG logs to each follow up   Patient encouraged to call for any BG/medication issues, concerns, or questions.    Orders Placed This Encounter   Procedures    Hemoglobin A1C

## 2022-08-18 ENCOUNTER — DOCUMENT SCAN (OUTPATIENT)
Dept: HOME HEALTH SERVICES | Facility: HOSPITAL | Age: 67
End: 2022-08-18
Payer: MEDICARE

## 2022-08-19 ENCOUNTER — NURSE TRIAGE (OUTPATIENT)
Dept: ADMINISTRATIVE | Facility: CLINIC | Age: 67
End: 2022-08-19
Payer: MEDICARE

## 2022-08-19 DIAGNOSIS — E11.65 TYPE 2 DIABETES MELLITUS WITH HYPERGLYCEMIA, WITHOUT LONG-TERM CURRENT USE OF INSULIN: ICD-10-CM

## 2022-08-19 RX ORDER — INSULIN ASPART 100 [IU]/ML
10 INJECTION, SOLUTION INTRAVENOUS; SUBCUTANEOUS
Qty: 32.4 ML | Refills: 3
Start: 2022-08-19 | End: 2022-10-20

## 2022-08-19 NOTE — TELEPHONE ENCOUNTER
Please call pt. He seems to be adjusting well to Toujeo dose increase. Therefore, continue Toujeo 34 units but decrease Novolog back to 10 units.

## 2022-08-19 NOTE — TELEPHONE ENCOUNTER
Pt seen on 8/17 and made appropriate changes (tounalejandro 34 uhnits and novolog 12 u +ss) as of 8/18    Bg for 8/18  Breakfast 135 - 12 units  Lunch 106 - 12 units   Dinner 136 - 12 units    Bg for today  104 - 12 units  86 - decided to hold novolog    Please advise if want any adjustments going into the weekend

## 2022-08-19 NOTE — TELEPHONE ENCOUNTER
Pt wife stated that BS was 86 after meal. Stated that BS has never been this low. Denies any s/s of hypoglycemia. Asked if she can hold ss since 86 wasn't on directions. Instructed to hold lunch dose and check BS again as scheduled. Verbalized understanding. Encounter routed to provider.      Reason for Disposition   Caller has medicine question only, adult not sick, and triager answers question    Protocols used: MEDICATION QUESTION CALL-A-[place]

## 2022-08-19 NOTE — TELEPHONE ENCOUNTER
Wife calling on behalf of pt. Reports his blood glucose is 86 currently and she would like to know if he should take his 12U of insulin. She was given information/advice, per protocol. Verbalizes understanding. She has a direct number to the office which she says she will call but I have informed her I would also route a message.     Reason for Disposition   Caller has URGENT medication or insulin pump question and triager unable to answer question    Additional Information   Negative: Unconscious or difficult to awaken   Negative: Seizure occurs   Negative: Acting confused (e.g., disoriented, slurred speech)   Negative: Very weak (can't stand)   Negative: Sounds like a life-threatening emergency to the triager   Negative: Vomiting and signs of dehydration (e.g., very dry mouth, lightheaded, dark urine, etc.)   Negative: Low blood sugar symptoms persist > 30 minutes AND using low blood sugar Care Advice   Negative: Low blood glucose (< 70 mg/dL or 3.9 mmol/L) persists > 30 minutes AND using low blood sugar Care Advice   Negative: Patient sounds very sick or weak to the triager    Protocols used: DIABETES - LOW BLOOD SUGAR-A-OH

## 2022-08-21 PROBLEM — I63.9 CEREBROVASCULAR ACCIDENT (CVA) DUE TO EMBOLISM: Status: ACTIVE | Noted: 2022-08-21

## 2022-08-22 ENCOUNTER — TELEPHONE (OUTPATIENT)
Dept: ENDOCRINOLOGY | Facility: CLINIC | Age: 67
End: 2022-08-22
Payer: MEDICARE

## 2022-08-22 PROBLEM — R29.90 EPISODE OF TRANSIENT NEUROLOGIC SYMPTOMS: Status: ACTIVE | Noted: 2022-08-22

## 2022-08-22 NOTE — TELEPHONE ENCOUNTER
Ginger disregard my prev msg I found the msg w/ the insulin dosages. I s/w wife and notified her of new dosage and verb understanding

## 2022-08-23 PROBLEM — E04.1 RIGHT THYROID NODULE: Status: ACTIVE | Noted: 2022-08-23

## 2022-08-24 ENCOUNTER — TELEPHONE (OUTPATIENT)
Dept: FAMILY MEDICINE | Facility: CLINIC | Age: 67
End: 2022-08-24
Payer: MEDICARE

## 2022-08-24 ENCOUNTER — TELEPHONE (OUTPATIENT)
Dept: NEUROLOGY | Facility: CLINIC | Age: 67
End: 2022-08-24
Payer: MEDICARE

## 2022-08-24 NOTE — TELEPHONE ENCOUNTER
----- Message from Yovana Ocasio sent at 8/24/2022 12:22 PM CDT -----  Regarding: Hospital follow up  Contact: [facility], fei Liz want to speak with a nurse regarding scheduling hospital follow up in one week, please call back at [phone] (home)

## 2022-08-24 NOTE — TELEPHONE ENCOUNTER
----- Message from Yovana Ocasio sent at 8/24/2022 12:20 PM CDT -----  Regarding: appointment  [facility]  Patient need to schedule a hospital follow up appointment in one week, please call back at [phone] (home)

## 2022-09-06 ENCOUNTER — TELEPHONE (OUTPATIENT)
Dept: NEUROLOGY | Facility: CLINIC | Age: 67
End: 2022-09-06
Payer: MEDICARE

## 2022-09-07 ENCOUNTER — OFFICE VISIT (OUTPATIENT)
Dept: NEUROLOGY | Facility: CLINIC | Age: 67
End: 2022-09-07
Payer: MEDICARE

## 2022-09-07 ENCOUNTER — OFFICE VISIT (OUTPATIENT)
Dept: FAMILY MEDICINE | Facility: CLINIC | Age: 67
End: 2022-09-07
Payer: MEDICARE

## 2022-09-07 VITALS
OXYGEN SATURATION: 98 % | DIASTOLIC BLOOD PRESSURE: 84 MMHG | SYSTOLIC BLOOD PRESSURE: 132 MMHG | SYSTOLIC BLOOD PRESSURE: 132 MMHG | HEART RATE: 58 BPM | RESPIRATION RATE: 16 BRPM | HEIGHT: 75 IN | HEART RATE: 62 BPM | BODY MASS INDEX: 32.75 KG/M2 | DIASTOLIC BLOOD PRESSURE: 78 MMHG

## 2022-09-07 DIAGNOSIS — E11.51 TYPE II DIABETES MELLITUS WITH PERIPHERAL CIRCULATORY DISORDER: ICD-10-CM

## 2022-09-07 DIAGNOSIS — Z86.73 HISTORY OF MULTIPLE STROKES: ICD-10-CM

## 2022-09-07 DIAGNOSIS — I10 PRIMARY HYPERTENSION: ICD-10-CM

## 2022-09-07 DIAGNOSIS — I65.23 BILATERAL CAROTID ARTERY STENOSIS: ICD-10-CM

## 2022-09-07 DIAGNOSIS — Z86.79 HISTORY OF SUBARACHNOID HEMORRHAGE: ICD-10-CM

## 2022-09-07 DIAGNOSIS — I48.0 PAROXYSMAL ATRIAL FIBRILLATION: ICD-10-CM

## 2022-09-07 DIAGNOSIS — G40.909 SEIZURE DISORDER: Primary | ICD-10-CM

## 2022-09-07 DIAGNOSIS — E11.9 TYPE 2 DIABETES MELLITUS WITHOUT COMPLICATION, WITHOUT LONG-TERM CURRENT USE OF INSULIN: ICD-10-CM

## 2022-09-07 DIAGNOSIS — I63.411 CEREBROVASCULAR ACCIDENT (CVA) DUE TO EMBOLISM OF RIGHT MIDDLE CEREBRAL ARTERY: Primary | ICD-10-CM

## 2022-09-07 PROCEDURE — 3078F PR MOST RECENT DIASTOLIC BLOOD PRESSURE < 80 MM HG: ICD-10-PCS | Mod: CPTII,S$GLB,, | Performed by: NURSE PRACTITIONER

## 2022-09-07 PROCEDURE — 3061F NEG MICROALBUMINURIA REV: CPT | Mod: CPTII,S$GLB,, | Performed by: NURSE PRACTITIONER

## 2022-09-07 PROCEDURE — 1159F MED LIST DOCD IN RCRD: CPT | Mod: CPTII,S$GLB,, | Performed by: NURSE PRACTITIONER

## 2022-09-07 PROCEDURE — 3072F LOW RISK FOR RETINOPATHY: CPT | Mod: CPTII,S$GLB,, | Performed by: INTERNAL MEDICINE

## 2022-09-07 PROCEDURE — 3061F PR NEG MICROALBUMINURIA RESULT DOCUMENTED/REVIEW: ICD-10-PCS | Mod: CPTII,S$GLB,, | Performed by: INTERNAL MEDICINE

## 2022-09-07 PROCEDURE — 3066F PR DOCUMENTATION OF TREATMENT FOR NEPHROPATHY: ICD-10-PCS | Mod: CPTII,S$GLB,, | Performed by: INTERNAL MEDICINE

## 2022-09-07 PROCEDURE — 3075F SYST BP GE 130 - 139MM HG: CPT | Mod: CPTII,S$GLB,, | Performed by: INTERNAL MEDICINE

## 2022-09-07 PROCEDURE — 3066F PR DOCUMENTATION OF TREATMENT FOR NEPHROPATHY: ICD-10-PCS | Mod: CPTII,S$GLB,, | Performed by: NURSE PRACTITIONER

## 2022-09-07 PROCEDURE — 1159F PR MEDICATION LIST DOCUMENTED IN MEDICAL RECORD: ICD-10-PCS | Mod: CPTII,S$GLB,, | Performed by: NURSE PRACTITIONER

## 2022-09-07 PROCEDURE — 3066F NEPHROPATHY DOC TX: CPT | Mod: CPTII,S$GLB,, | Performed by: NURSE PRACTITIONER

## 2022-09-07 PROCEDURE — 3061F NEG MICROALBUMINURIA REV: CPT | Mod: CPTII,S$GLB,, | Performed by: INTERNAL MEDICINE

## 2022-09-07 PROCEDURE — 99214 OFFICE O/P EST MOD 30 MIN: CPT | Mod: S$GLB,,, | Performed by: INTERNAL MEDICINE

## 2022-09-07 PROCEDURE — 99999 PR PBB SHADOW E&M-EST. PATIENT-LVL V: ICD-10-PCS | Mod: PBBFAC,,, | Performed by: NURSE PRACTITIONER

## 2022-09-07 PROCEDURE — 4010F ACE/ARB THERAPY RXD/TAKEN: CPT | Mod: CPTII,S$GLB,, | Performed by: NURSE PRACTITIONER

## 2022-09-07 PROCEDURE — 1159F MED LIST DOCD IN RCRD: CPT | Mod: CPTII,S$GLB,, | Performed by: INTERNAL MEDICINE

## 2022-09-07 PROCEDURE — 1159F PR MEDICATION LIST DOCUMENTED IN MEDICAL RECORD: ICD-10-PCS | Mod: CPTII,S$GLB,, | Performed by: INTERNAL MEDICINE

## 2022-09-07 PROCEDURE — 4010F ACE/ARB THERAPY RXD/TAKEN: CPT | Mod: CPTII,S$GLB,, | Performed by: INTERNAL MEDICINE

## 2022-09-07 PROCEDURE — 99999 PR PBB SHADOW E&M-EST. PATIENT-LVL IV: CPT | Mod: PBBFAC,,, | Performed by: INTERNAL MEDICINE

## 2022-09-07 PROCEDURE — 3078F DIAST BP <80 MM HG: CPT | Mod: CPTII,S$GLB,, | Performed by: NURSE PRACTITIONER

## 2022-09-07 PROCEDURE — 99999 PR PBB SHADOW E&M-EST. PATIENT-LVL V: CPT | Mod: PBBFAC,,, | Performed by: NURSE PRACTITIONER

## 2022-09-07 PROCEDURE — 99215 OFFICE O/P EST HI 40 MIN: CPT | Mod: S$GLB,,, | Performed by: NURSE PRACTITIONER

## 2022-09-07 PROCEDURE — 99999 PR PBB SHADOW E&M-EST. PATIENT-LVL IV: ICD-10-PCS | Mod: PBBFAC,,, | Performed by: INTERNAL MEDICINE

## 2022-09-07 PROCEDURE — 3072F PR LOW RISK FOR RETINOPATHY: ICD-10-PCS | Mod: CPTII,S$GLB,, | Performed by: INTERNAL MEDICINE

## 2022-09-07 PROCEDURE — 3052F PR MOST RECENT HEMOGLOBIN A1C LEVEL 8.0 - < 9.0%: ICD-10-PCS | Mod: CPTII,S$GLB,, | Performed by: NURSE PRACTITIONER

## 2022-09-07 PROCEDURE — 3008F BODY MASS INDEX DOCD: CPT | Mod: CPTII,S$GLB,, | Performed by: NURSE PRACTITIONER

## 2022-09-07 PROCEDURE — 99214 PR OFFICE/OUTPT VISIT, EST, LEVL IV, 30-39 MIN: ICD-10-PCS | Mod: S$GLB,,, | Performed by: INTERNAL MEDICINE

## 2022-09-07 PROCEDURE — 3072F LOW RISK FOR RETINOPATHY: CPT | Mod: CPTII,S$GLB,, | Performed by: NURSE PRACTITIONER

## 2022-09-07 PROCEDURE — 3061F PR NEG MICROALBUMINURIA RESULT DOCUMENTED/REVIEW: ICD-10-PCS | Mod: CPTII,S$GLB,, | Performed by: NURSE PRACTITIONER

## 2022-09-07 PROCEDURE — 99215 PR OFFICE/OUTPT VISIT, EST, LEVL V, 40-54 MIN: ICD-10-PCS | Mod: S$GLB,,, | Performed by: NURSE PRACTITIONER

## 2022-09-07 PROCEDURE — 3079F PR MOST RECENT DIASTOLIC BLOOD PRESSURE 80-89 MM HG: ICD-10-PCS | Mod: CPTII,S$GLB,, | Performed by: INTERNAL MEDICINE

## 2022-09-07 PROCEDURE — 3072F PR LOW RISK FOR RETINOPATHY: ICD-10-PCS | Mod: CPTII,S$GLB,, | Performed by: NURSE PRACTITIONER

## 2022-09-07 PROCEDURE — 3066F NEPHROPATHY DOC TX: CPT | Mod: CPTII,S$GLB,, | Performed by: INTERNAL MEDICINE

## 2022-09-07 PROCEDURE — 4010F PR ACE/ARB THEARPY RXD/TAKEN: ICD-10-PCS | Mod: CPTII,S$GLB,, | Performed by: NURSE PRACTITIONER

## 2022-09-07 PROCEDURE — 3052F HG A1C>EQUAL 8.0%<EQUAL 9.0%: CPT | Mod: CPTII,S$GLB,, | Performed by: NURSE PRACTITIONER

## 2022-09-07 PROCEDURE — 3075F SYST BP GE 130 - 139MM HG: CPT | Mod: CPTII,S$GLB,, | Performed by: NURSE PRACTITIONER

## 2022-09-07 PROCEDURE — 3079F DIAST BP 80-89 MM HG: CPT | Mod: CPTII,S$GLB,, | Performed by: INTERNAL MEDICINE

## 2022-09-07 PROCEDURE — 3075F PR MOST RECENT SYSTOLIC BLOOD PRESS GE 130-139MM HG: ICD-10-PCS | Mod: CPTII,S$GLB,, | Performed by: NURSE PRACTITIONER

## 2022-09-07 PROCEDURE — 4010F PR ACE/ARB THEARPY RXD/TAKEN: ICD-10-PCS | Mod: CPTII,S$GLB,, | Performed by: INTERNAL MEDICINE

## 2022-09-07 PROCEDURE — 3008F PR BODY MASS INDEX (BMI) DOCUMENTED: ICD-10-PCS | Mod: CPTII,S$GLB,, | Performed by: NURSE PRACTITIONER

## 2022-09-07 PROCEDURE — 3052F HG A1C>EQUAL 8.0%<EQUAL 9.0%: CPT | Mod: CPTII,S$GLB,, | Performed by: INTERNAL MEDICINE

## 2022-09-07 PROCEDURE — 3052F PR MOST RECENT HEMOGLOBIN A1C LEVEL 8.0 - < 9.0%: ICD-10-PCS | Mod: CPTII,S$GLB,, | Performed by: INTERNAL MEDICINE

## 2022-09-07 PROCEDURE — 3075F PR MOST RECENT SYSTOLIC BLOOD PRESS GE 130-139MM HG: ICD-10-PCS | Mod: CPTII,S$GLB,, | Performed by: INTERNAL MEDICINE

## 2022-09-07 RX ORDER — CLOPIDOGREL BISULFATE 75 MG/1
75 TABLET ORAL DAILY
Qty: 90 TABLET | Refills: 0 | Status: SHIPPED | OUTPATIENT
Start: 2022-09-07 | End: 2022-11-20 | Stop reason: SDUPTHER

## 2022-09-07 NOTE — PROGRESS NOTES
NEUROLOGY  Outpatient Follow Up Visit     [facility]  [address] [facility], [place] [address]  [phone] (office) / [phone] (fax)    Patient Name:  Samuel Cast  :  1955  MR #:  [id number]  Acct #:  [id number]    Date of  Visit: 2022    Other Physicians:  Nallely Jurado MD (Primary Care Physician); Hospital, [facility]* (Referring)      CHIEF COMPLAINT: Hospital Follow Up (CVA)      INTERVAL HISTORY:  Samuel Cast is a 66 y.o. male seen in hospital follow up for CVA.     The patient is new to me today, but was evaluated by Dr. Horton during a recent admission to [facility].     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 He was comatose for several weeks. He has R HP and mild aphasia since. They are not sure of the cause of the stroke. Wife notes that he was abusing ETOH and under a lot of stress back then. They can't recall what or if he was on any AP agents afterwards.     He had a traumatic brain bleed in 2022 after a fall at home. He didn't require surgery. He was on phenytoin at the time. Levels were high, so he was transitioned to Keppra.     Most recently, he had a an episode of worsening aphasia. He was grunting and unable to speak. He returned to baseline by the time that he got to the ED. MRI brain showed an acute ischemic stroke in the R MCA territory.     No personal or family history of VTE.     Wife denies known history of Afib, doesn't routinely see cardiology. Never on DOAC that she is aware of. ILR was placed prior to d/c for monitoring.     He is maintained on seizure medication. He was started on this after initial stroke in . He had a seizure when they tried to take him off of it. No seizures since. No SE from Rx.     Not having HA. Back to baseline. Getting home health, therapy.     Prior  "history:  [facility] Neurology Consultation 8/21/22:  "Mr. Cast is a 66 year old man with history of left MCA stroke with resultant right hemiplegia and aphasia who presented for episode and was found to have a new right MCA territory ischemic stroke on MRI. He is unable to give much history so I called his wife who gave additional history.      She notes that he had a stroke in June 2007. She states this was an ischemic stroke but that he was in a coma for 6 weeks. He has remained paralyzed on his right side and with aphasia since that time.      Then he had a "brain bleed" last fall. She is unclear if he has a h/o afib though this is documented in his chart. She is unsure if he was on blood thinners in the past.      She states yesterday he had a late lunch and fell asleep about 3PM. He then woke up and was grunting and unable to speak. He was awake and alert and looked fearful. He was able to follow commands. She called EMS and by the time he arrived to the hospital, he was back to baseline.      He is on keppra 750 mg BID. His wife is unsure why he is on that because she is unaware of seizures in the past."     Allergies:  Review of patient's allergies indicates:  No Known Allergies    Current Medications:  Current Outpatient Medications   Medication Sig Dispense Refill    aspirin 81 MG Chew Take 1 tablet (81 mg total) by mouth once daily. 30 tablet 2    atorvastatin (LIPITOR) 80 MG tablet Take 1 tablet (80 mg total) by mouth once daily. 90 tablet 3    blood sugar diagnostic Strp To check BG 3-4 times daily, to use with insurance preferred meter. E11.65 200 strip 1    blood-glucose meter kit To check BG 3-4 times daily, to use with insurance preferred meter. E11.65 1 each 1    insulin aspart U-100 (NOVOLOG) 100 unit/mL (3 mL) InPn pen Inject 10 Units into the skin 3 (three) times daily before meals. Plus correction scale, MTDD 60u 32.4 mL 3    insulin glargine, TOUJEO, (TOUJEO SOLOSTAR U-300 INSULIN) 300 unit/mL (1.5 " "mL) InPn pen Inject 34 Units into the skin every evening. 3 pen 6    lancets Misc To check BG 3-4 times daily, to use with insurance preferred meter. E11.65 200 each 1    levETIRAcetam (KEPPRA) 750 MG Tab Take 1 tablet (750 mg total) by mouth 2 (two) times daily. 180 tablet 3    losartan (COZAAR) 25 MG tablet Take 4 tablets (100 mg total) by mouth once daily. 90 tablet 3    magnesium oxide (MAG-OX) 400 mg (241.3 mg magnesium) tablet Take 1 tablet (400 mg total) by mouth once daily. 30 tablet 1    metFORMIN (GLUCOPHAGE) 500 MG tablet Take 1 tablet (500 mg total) by mouth 2 (two) times daily with meals.  0    metoprolol tartrate (LOPRESSOR) 50 MG tablet Take 2 tablets (100 mg total) by mouth 2 (two) times a day. 360 tablet 3    pantoprazole (PROTONIX) 20 MG tablet Take 1 tablet (20 mg total) by mouth once daily. (Patient taking differently: Take 20 mg by mouth every evening.) 90 tablet 3    pen needle, diabetic 32 gauge x 5/32" Ndle Use with insulin pen as prescribed. E11.65 200 each 1    polyethylene glycol (GLYCOLAX) 17 gram PwPk Take 17 g by mouth daily as needed (constipation). 30 each 0    sertraline (ZOLOFT) 100 MG tablet Take 1 tablet (100 mg total) by mouth once daily. 90 tablet 3    clopidogreL (PLAVIX) 75 mg tablet Take 1 tablet (75 mg total) by mouth once daily. Take plavix daily for only 21 days then stop per neurology recommendations 90 tablet 0     No current facility-administered medications for this visit.       Past Medical History:  Past Medical History:   Diagnosis Date    Diabetes mellitus, type 2     Hypertension     Seizures     no seizure in 14 years    Stroke        Past Surgical History:  Past Surgical History:   Procedure Laterality Date    COLONOSCOPY      EPIDURAL STEROID INJECTION INTO LUMBAR SPINE N/A 7/28/2021    Procedure: Injection-steroid-epidural-lumbar L5/S1;  Surgeon: Alfonso Childers MD;  Location: [facility];  Service: Pain Management;  Laterality: N/A;    FIXATION KYPHOPLASTY N/A " "6/30/2021    Procedure: Kyphoplasty L1, L2, L3;  Surgeon: Alfonso Chliders MD;  Location: [facility] OR;  Service: Pain Management;  Laterality: N/A;    INSERTION OF IMPLANTABLE LOOP RECORDER N/A 8/24/2022    Procedure: Insertion, Implantable Loop Recorder;  Surgeon: Jeanmarie Andrade MD;  Location[facility] CATH;  Service: Cardiology;  Laterality: N/A;    KNEE SURGERY         Family History:  family history includes Cancer in his mother; Cancer (age of onset: 70) in his father.    Social History:   reports that he has never smoked. He has never used smokeless tobacco. He reports that he does not currently use alcohol. He reports that he does not currently use drugs.      PHYSICAL EXAM:  /78 (BP Location: Right arm, Patient Position: Sitting, BP Method: Large (Automatic))   Pulse (!) 58   Resp 16   Ht 6' 3" (1.905 m)   BMI 32.75 kg/m²     General: Well groomed. NAD.  Pulmonary: Normal effort and rate.   Musculoskeletal: No obvious joint deformities, moves all extremities well.  Extremities: No clubbing, cyanosis or edema.     Neurological exam:  Mental status: Awake and alert.  Oriented to person, place and situation. Normal attention and concentration.   Speech/Language: mild WFD. No dysarthria. Able to follow complex commands.   Cranial nerves (II-XII): Visual fields full. Pupils equal round and reactive to light, extraocular movements intact, no ptosis, no nystagmus. Facial sensation and symmetry intact bilaterally. Hearing grossly intact. Palate deferred. Shoulder shrug normal bilaterally. Normal tongue protrusion.   Motor: dense R hemiplegia, but minimal spasticity (arm > leg).   Sensation: decreased vibration to the knees bilaterally.    DTR: 3+ on the R, 2+ on the left.   Coordination: Finger-nose-finger intact on the L.  No tremor.   Gait: deferred.     DIAGNOSTIC DATA:  I have personally reviewed provider notes, labs and imaging made available to me today.     Pt was on ASA and rosuvastatin for CVA prophylaxis " dating back to 2019, when he established care in [place]. After SAH in 4/2022, ASA was discontinued.     Records indicate that Afib with RVR was capturing on tele / EKG during SAH admission. AC was contraindicated at the time. He converted to NSR spontaneously.     Imaging:  MRI brain wo 8/21/22:    Impression:  1. Subtle area of infarct without hemorrhagic transformation involving the right frontal lobe adjacent to the gyri.  2. Area of prior infarct involving the left MCA distribution.    CTA head & neck 8/22/22:  Impression:  1. 70-80% stenosis of the right ICA near the origin in the neck with associated bulky predominantly noncalcified plaque.  2. 20-30% stenosis in the left ICA near the origin in the neck.  3. 3 cm right thyroid nodule.  Correlate with any prior thyroid ultrasound if none is available consider nonemergent outpatient thyroid ultrasound for further evaluation.  4. Additional findings as above.    EEG 8/22/22:  Interpretation  The short-term video EEG shows mild diffuse nonspecific slowing of the background.  No potentially epileptiform activity was seen.     Cardiac:  TTE 8/22/22:  The left ventricle is normal in size with concentric remodeling and normal systolic function.  Indeterminate left ventricular diastolic function.  The estimated ejection fraction is 60%.  With normal right ventricular systolic function.  There is no evidence of intracardiac shunting.  Normal central venous pressure (3 mmHg).  Technically difficult study.    EKG 5/2022 - Afib with RVR    Labs:  CBC:   Lab Results   Component Value Date    WBC 5.25 08/24/2022    HGB 13.8 (L) 08/24/2022    HCT 39.4 (L) 08/24/2022     08/24/2022    MCV 88 08/24/2022    RDW 12.8 08/24/2022     BMP:   Lab Results   Component Value Date     08/24/2022    K 4.3 08/24/2022     08/24/2022    CO2 26 08/24/2022    BUN 17 08/24/2022    CREATININE 0.62 08/24/2022     (H) 08/24/2022    CALCIUM 9.1 08/24/2022    MG 1.9 08/24/2022     PHOS 3.8 08/24/2022     LFTS;   Lab Results   Component Value Date    PROT 6.8 08/23/2022    ALBUMIN 3.7 08/23/2022    BILITOT 0.8 08/23/2022    AST 22 08/23/2022    ALKPHOS 89 08/23/2022    ALT 22 08/23/2022     COAGS:   Lab Results   Component Value Date    INR 1.1 08/22/2022     FLP:   Lab Results   Component Value Date    CHOL 139 08/22/2022    HDL 30 (L) 08/22/2022    LDLCALC 83.0 08/22/2022    TRIG 130 08/22/2022    CHOLHDL 21.6 08/22/2022     Lab Results   Component Value Date    HGBA1C 8.6 (H) 08/10/2022     Lab Results   Component Value Date    TSH 2.250 08/21/2022   No results found for: [id number]  Lab Results   Component Value Date    HGBA1C 8.6 (H) 08/10/2022         ASSESSMENT & PLAN:  Samuel Cast is a 66 y.o.  male seen in hospital follow up for CVA.     Problem List Items Addressed This Visit          Neuro    History of subarachnoid hemorrhage    Overview     April 2022 - traumatic, no surgical intervention         Seizure disorder - Primary    Current Assessment & Plan     Continue same Keppra          History of multiple strokes    Overview     June 2007 - large L MCA territory CVA w/residual R HP and aphasia, unclear etiology but suspicious for embolic stroke given appearance, occurred while living in [place]    Aug 2021 - R frontal (MCA) ischemic stroke without clinical residual          Current Assessment & Plan     TTE without shunt, no LA measurements  Episode of Afib captured inpatient May 2022 -- now s/p ILR for arrhythmia monitoring   CTA with 70-80% R ICA stenosis, 20-30% L ICA stenosis  LDL 83   A1C 8.6    Off of AP since traumatic SAH in April 2022 -- DAPT resumed, will plan to continue for 90 days given significant R ICA stenosis noted.   arrhythmia monitoring -- DOAC if captured  Refer to vascular to discuss possible surgical intervention for R ICA stenosis   Continue HI statin for goal LDL < 70  Continue efforts to improve A1C via PCP  CVA education provided              Cardiac/Vascular    HTN (hypertension)    A-fib    Bilateral carotid artery stenosis    Overview     CTA head and neck 8/2022:  70-80% R ICA, 20-30% L ICA         Current Assessment & Plan     Pt suffered recent R MCA territory stroke, which may have been atheroembolic from R ICA stenosis vs cardio embolic from subclinical Afib   Continue DAPT, statin  Refer to vascular to consider surgical intervention             Endocrine    Type 2 diabetes mellitus without complication, without long-term current use of insulin       Follow up: 3 months     I spent a total of 40 minutes on the day of the visit.    This includes face to face time with the patient, as well as non-face to face time preparing for and completing the visit (review of prior diagnostic testing and clinical notes, obtaining or reviewing history, documenting clinical information in the EMR, independently interpreting and communicating results to the patient/family and coordinating ongoing care).               I appreciate the opportunity to participate in the care of this patient. Please feel free to contact me with any questions or concerns.       Carolyn Dash, [facility]  [facility]  [address] [facility] [place] [phone]

## 2022-09-07 NOTE — PATIENT INSTRUCTIONS
I am going to refer you to a vascular specialist (Dr. Mason or Dr. Anderson) to consider possible surgery given the blockage noted in the R carotid artery.     For now, we manage this medically -- we will continue the same aspirin 81 mg. We will extend the clopidogrel 75 mg for another 90 days given the degree of blockage. Continue the same cholesterol medication. Continue to work with your PCP to get the A1C down / diabetes under control    Continue therapy as you are doing.     Continue same Keppra to prevent seizures.     Let me know if you develop any headaches or change in mental status. If there is concern for stroke, call 911 or go to nearest ED.     Follow up here in about 3 months or sooner if needed    (1) bedfast

## 2022-09-07 NOTE — Clinical Note
We briefly discussed this in secure chat. Pt recently had R MCA stroke, has 70-80% stenosis in R ICA on CTA. From what I am told, Dr Jain is leaving or has already left. Placed referral to Dr. Anderson. Let me know if he would rather not see him & will try to find alternative.

## 2022-09-07 NOTE — PROGRESS NOTES
Subjective:       Patient ID: Samuel Cast is a 66 y.o. male.    Chief Complaint: Hospital Follow Up    Pt here for hospital follow up. Admitted with aphasia/ischemic stroke. Started on dual antiplt asa and plavix for 21 days. Also had loop recorder implanted to eval if afib. Pt has appt with neurology today. Speech back to baseline.  Physical therapy coming twice a week.     DM- on 24 units toujeo and 10 units novolog qid . Back on metfomrin  Review of Systems   Constitutional:  Negative for fatigue and fever.   Respiratory:  Negative for cough and shortness of breath.    Cardiovascular:  Negative for chest pain and palpitations.   Gastrointestinal:  Negative for abdominal pain.   Musculoskeletal:  Negative for back pain.   Neurological:  Negative for headaches.       Objective:      Physical Exam  Constitutional:       Appearance: Normal appearance.   HENT:      Head: Normocephalic.   Cardiovascular:      Rate and Rhythm: Normal rate and regular rhythm.   Pulmonary:      Effort: Pulmonary effort is normal.      Breath sounds: Normal breath sounds.   Musculoskeletal:      Cervical back: Normal range of motion.   Neurological:      Mental Status: He is alert.   Psychiatric:         Mood and Affect: Mood normal.         Behavior: Behavior normal.       Assessment:       Problem List Items Addressed This Visit          Neuro    Cerebrovascular accident (CVA) due to embolism - Primary       Endocrine    Type II diabetes mellitus with peripheral circulatory disorder       Plan:       Cva- on dual antiplt for 21 days asa and plavix. On statin, bp controlled. Dm well managed. Has loop recorder meryl in- no a fib yet. H as appt with neurology today  Dm- continue current meds. Has appt with endo next mo

## 2022-09-10 PROCEDURE — G0179 PR HOME HEALTH MD RECERTIFICATION: ICD-10-PCS | Mod: ,,, | Performed by: INTERNAL MEDICINE

## 2022-09-10 PROCEDURE — G0179 MD RECERTIFICATION HHA PT: HCPCS | Mod: ,,, | Performed by: INTERNAL MEDICINE

## 2022-09-13 ENCOUNTER — PATIENT MESSAGE (OUTPATIENT)
Dept: ADMINISTRATIVE | Facility: OTHER | Age: 67
End: 2022-09-13
Payer: MEDICARE

## 2022-09-14 ENCOUNTER — TELEPHONE (OUTPATIENT)
Dept: NEUROSURGERY | Facility: CLINIC | Age: 67
End: 2022-09-14
Payer: MEDICARE

## 2022-09-14 ENCOUNTER — DOCUMENT SCAN (OUTPATIENT)
Dept: HOME HEALTH SERVICES | Facility: HOSPITAL | Age: 67
End: 2022-09-14
Payer: MEDICARE

## 2022-09-14 PROBLEM — Z86.73 HISTORY OF MULTIPLE STROKES: Status: ACTIVE | Noted: 2022-09-14

## 2022-09-14 PROBLEM — Z86.73 HISTORY OF CARDIOEMBOLIC CEREBROVASCULAR ACCIDENT (CVA): Status: RESOLVED | Noted: 2019-10-31 | Resolved: 2022-09-14

## 2022-09-14 PROBLEM — R29.90 EPISODE OF TRANSIENT NEUROLOGIC SYMPTOMS: Status: RESOLVED | Noted: 2022-08-22 | Resolved: 2022-09-14

## 2022-09-14 PROBLEM — Z86.79 HISTORY OF SUBARACHNOID HEMORRHAGE: Status: ACTIVE | Noted: 2022-04-27

## 2022-09-14 PROBLEM — I65.23 BILATERAL CAROTID ARTERY STENOSIS: Status: ACTIVE | Noted: 2022-09-14

## 2022-09-14 PROBLEM — I63.9 CEREBROVASCULAR ACCIDENT (CVA) DUE TO EMBOLISM: Status: RESOLVED | Noted: 2022-08-21 | Resolved: 2022-09-14

## 2022-09-14 NOTE — ASSESSMENT & PLAN NOTE
TTE without shunt, no LA measurements  Episode of Afib captured inpatient May 2022 -- now s/p ILR for arrhythmia monitoring   CTA with 70-80% R ICA stenosis, 20-30% L ICA stenosis  LDL 83   A1C 8.6    Off of AP since traumatic SAH in April 2022 -- DAPT resumed, will plan to continue for 90 days given significant R ICA stenosis noted.   arrhythmia monitoring -- DOAC if captured  Refer to vascular to discuss possible surgical intervention for R ICA stenosis   Continue HI statin for goal LDL < 70  Continue efforts to improve A1C via PCP  CVA education provided

## 2022-09-14 NOTE — ASSESSMENT & PLAN NOTE
Pt suffered recent R MCA territory stroke, which may have been atheroembolic from R ICA stenosis vs cardio embolic from subclinical Afib   Continue DAPT, statin  Refer to vascular to consider surgical intervention

## 2022-09-14 NOTE — TELEPHONE ENCOUNTER
Called patient to schedule NP appt with Dr. Anderson per referral from Carolyn Dash NP.  No answer.  LVM

## 2022-09-21 ENCOUNTER — TELEPHONE (OUTPATIENT)
Dept: NEUROSURGERY | Facility: CLINIC | Age: 67
End: 2022-09-21
Payer: MEDICARE

## 2022-09-28 ENCOUNTER — TELEPHONE (OUTPATIENT)
Dept: NEUROSURGERY | Facility: CLINIC | Age: 67
End: 2022-09-28
Payer: MEDICARE

## 2022-09-28 ENCOUNTER — EXTERNAL HOME HEALTH (OUTPATIENT)
Dept: HOME HEALTH SERVICES | Facility: HOSPITAL | Age: 67
End: 2022-09-28
Payer: MEDICARE

## 2022-09-30 ENCOUNTER — DOCUMENT SCAN (OUTPATIENT)
Dept: HOME HEALTH SERVICES | Facility: HOSPITAL | Age: 67
End: 2022-09-30
Payer: MEDICARE

## 2022-10-14 ENCOUNTER — LAB VISIT (OUTPATIENT)
Dept: LAB | Facility: HOSPITAL | Age: 67
End: 2022-10-14
Attending: NURSE PRACTITIONER
Payer: MEDICARE

## 2022-10-14 DIAGNOSIS — E11.51 TYPE II DIABETES MELLITUS WITH PERIPHERAL CIRCULATORY DISORDER: Primary | ICD-10-CM

## 2022-10-14 PROCEDURE — 83036 HEMOGLOBIN GLYCOSYLATED A1C: CPT | Performed by: NURSE PRACTITIONER

## 2022-10-18 ENCOUNTER — PES CALL (OUTPATIENT)
Dept: ADMINISTRATIVE | Facility: OTHER | Age: 67
End: 2022-10-18
Payer: MEDICARE

## 2022-10-20 ENCOUNTER — OFFICE VISIT (OUTPATIENT)
Dept: ENDOCRINOLOGY | Facility: CLINIC | Age: 67
End: 2022-10-20
Payer: MEDICARE

## 2022-10-20 ENCOUNTER — TELEPHONE (OUTPATIENT)
Dept: NEUROLOGY | Facility: CLINIC | Age: 67
End: 2022-10-20
Payer: MEDICARE

## 2022-10-20 VITALS
DIASTOLIC BLOOD PRESSURE: 84 MMHG | BODY MASS INDEX: 32.75 KG/M2 | HEIGHT: 75 IN | HEART RATE: 60 BPM | SYSTOLIC BLOOD PRESSURE: 136 MMHG

## 2022-10-20 DIAGNOSIS — Z86.73 HISTORY OF CARDIOEMBOLIC CEREBROVASCULAR ACCIDENT (CVA): ICD-10-CM

## 2022-10-20 DIAGNOSIS — I10 PRIMARY HYPERTENSION: ICD-10-CM

## 2022-10-20 DIAGNOSIS — G40.909 SEIZURE DISORDER: ICD-10-CM

## 2022-10-20 DIAGNOSIS — E11.65 TYPE 2 DIABETES MELLITUS WITH HYPERGLYCEMIA, WITHOUT LONG-TERM CURRENT USE OF INSULIN: Primary | ICD-10-CM

## 2022-10-20 PROCEDURE — 3066F NEPHROPATHY DOC TX: CPT | Mod: CPTII,S$GLB,, | Performed by: NURSE PRACTITIONER

## 2022-10-20 PROCEDURE — 1160F RVW MEDS BY RX/DR IN RCRD: CPT | Mod: CPTII,S$GLB,, | Performed by: NURSE PRACTITIONER

## 2022-10-20 PROCEDURE — 3079F DIAST BP 80-89 MM HG: CPT | Mod: CPTII,S$GLB,, | Performed by: NURSE PRACTITIONER

## 2022-10-20 PROCEDURE — 4010F PR ACE/ARB THEARPY RXD/TAKEN: ICD-10-PCS | Mod: CPTII,S$GLB,, | Performed by: NURSE PRACTITIONER

## 2022-10-20 PROCEDURE — 3079F PR MOST RECENT DIASTOLIC BLOOD PRESSURE 80-89 MM HG: ICD-10-PCS | Mod: CPTII,S$GLB,, | Performed by: NURSE PRACTITIONER

## 2022-10-20 PROCEDURE — 1159F MED LIST DOCD IN RCRD: CPT | Mod: CPTII,S$GLB,, | Performed by: NURSE PRACTITIONER

## 2022-10-20 PROCEDURE — 3061F NEG MICROALBUMINURIA REV: CPT | Mod: CPTII,S$GLB,, | Performed by: NURSE PRACTITIONER

## 2022-10-20 PROCEDURE — 1159F PR MEDICATION LIST DOCUMENTED IN MEDICAL RECORD: ICD-10-PCS | Mod: CPTII,S$GLB,, | Performed by: NURSE PRACTITIONER

## 2022-10-20 PROCEDURE — 3044F HG A1C LEVEL LT 7.0%: CPT | Mod: CPTII,S$GLB,, | Performed by: NURSE PRACTITIONER

## 2022-10-20 PROCEDURE — 4010F ACE/ARB THERAPY RXD/TAKEN: CPT | Mod: CPTII,S$GLB,, | Performed by: NURSE PRACTITIONER

## 2022-10-20 PROCEDURE — 3288F PR FALLS RISK ASSESSMENT DOCUMENTED: ICD-10-PCS | Mod: CPTII,S$GLB,, | Performed by: NURSE PRACTITIONER

## 2022-10-20 PROCEDURE — 99999 PR PBB SHADOW E&M-EST. PATIENT-LVL IV: ICD-10-PCS | Mod: PBBFAC,,, | Performed by: NURSE PRACTITIONER

## 2022-10-20 PROCEDURE — 3075F SYST BP GE 130 - 139MM HG: CPT | Mod: CPTII,S$GLB,, | Performed by: NURSE PRACTITIONER

## 2022-10-20 PROCEDURE — 99214 OFFICE O/P EST MOD 30 MIN: CPT | Mod: S$GLB,,, | Performed by: NURSE PRACTITIONER

## 2022-10-20 PROCEDURE — 99999 PR PBB SHADOW E&M-EST. PATIENT-LVL IV: CPT | Mod: PBBFAC,,, | Performed by: NURSE PRACTITIONER

## 2022-10-20 PROCEDURE — 99214 PR OFFICE/OUTPT VISIT, EST, LEVL IV, 30-39 MIN: ICD-10-PCS | Mod: S$GLB,,, | Performed by: NURSE PRACTITIONER

## 2022-10-20 PROCEDURE — 3288F FALL RISK ASSESSMENT DOCD: CPT | Mod: CPTII,S$GLB,, | Performed by: NURSE PRACTITIONER

## 2022-10-20 PROCEDURE — 3044F PR MOST RECENT HEMOGLOBIN A1C LEVEL <7.0%: ICD-10-PCS | Mod: CPTII,S$GLB,, | Performed by: NURSE PRACTITIONER

## 2022-10-20 PROCEDURE — 3075F PR MOST RECENT SYSTOLIC BLOOD PRESS GE 130-139MM HG: ICD-10-PCS | Mod: CPTII,S$GLB,, | Performed by: NURSE PRACTITIONER

## 2022-10-20 PROCEDURE — 3066F PR DOCUMENTATION OF TREATMENT FOR NEPHROPATHY: ICD-10-PCS | Mod: CPTII,S$GLB,, | Performed by: NURSE PRACTITIONER

## 2022-10-20 PROCEDURE — 1100F PR PT FALLS ASSESS DOC 2+ FALLS/FALL W/INJURY/YR: ICD-10-PCS | Mod: CPTII,S$GLB,, | Performed by: NURSE PRACTITIONER

## 2022-10-20 PROCEDURE — 1160F PR REVIEW ALL MEDS BY PRESCRIBER/CLIN PHARMACIST DOCUMENTED: ICD-10-PCS | Mod: CPTII,S$GLB,, | Performed by: NURSE PRACTITIONER

## 2022-10-20 PROCEDURE — 1100F PTFALLS ASSESS-DOCD GE2>/YR: CPT | Mod: CPTII,S$GLB,, | Performed by: NURSE PRACTITIONER

## 2022-10-20 PROCEDURE — 3072F LOW RISK FOR RETINOPATHY: CPT | Mod: CPTII,S$GLB,, | Performed by: NURSE PRACTITIONER

## 2022-10-20 PROCEDURE — 3072F PR LOW RISK FOR RETINOPATHY: ICD-10-PCS | Mod: CPTII,S$GLB,, | Performed by: NURSE PRACTITIONER

## 2022-10-20 PROCEDURE — 3061F PR NEG MICROALBUMINURIA RESULT DOCUMENTED/REVIEW: ICD-10-PCS | Mod: CPTII,S$GLB,, | Performed by: NURSE PRACTITIONER

## 2022-10-20 RX ORDER — INSULIN GLARGINE 300 U/ML
32 INJECTION, SOLUTION SUBCUTANEOUS NIGHTLY
Qty: 3 PEN | Refills: 6
Start: 2022-10-20 | End: 2023-01-26

## 2022-10-20 RX ORDER — GLUCOSAM/CHON-MSM1/C/MANG/BOSW 500-416.6
TABLET ORAL
COMMUNITY
Start: 2022-08-24 | End: 2023-10-31

## 2022-10-20 RX ORDER — METFORMIN HYDROCHLORIDE 750 MG/1
750 TABLET, EXTENDED RELEASE ORAL 2 TIMES DAILY WITH MEALS
Qty: 180 TABLET | Refills: 3 | Status: SHIPPED | OUTPATIENT
Start: 2022-10-20 | End: 2022-11-30 | Stop reason: SDUPTHER

## 2022-10-20 NOTE — PATIENT INSTRUCTIONS
Stop Novolog    Decrease Toujeo to 32 units.    Change metformin to metformin  mg twice daily.     Check blood sugar 2x/day, alternating times.     Notify me if blood sugars are often < 90 or > 150.

## 2022-10-20 NOTE — TELEPHONE ENCOUNTER
----- Message from Carolyn Dash NP sent at 10/20/2022 12:59 PM CDT -----  Pt was referred to BHAKTI Anderson for consideration of carotid intervention. Please forward to staff and pt states never contacted   ----- Message -----  From: MARÍA Matute,FNP-C  Sent: 10/20/2022  11:43 AM CDT  To: PARISH Acevedo Holly. I saw Mr. Cast today and wife mentioned that you referred to Vascular for ICA stenosis but they haven't heard anything. I couldn't find the referral order. Would you mind placing a new one?  Thanks!

## 2022-10-20 NOTE — Clinical Note
Carolyn Pruitt. I saw Mr. Cast today and wife mentioned that you referred to Vascular for ICA stenosis but they haven't heard anything. I couldn't find the referral order. Would you mind placing a new one? Thanks!

## 2022-10-20 NOTE — PROGRESS NOTES
"CC: Mr. Samuel Cast arrives today for management of Type 2 DM and review of chronic medical conditions, as listed in the Visit Diagnosis section of this encounter.       HPI: Mr. Samuel Cast was diagnosed with Type 2 DM in 2007, during workup of stroke. Initial treatment consisted of metformin, glimepiride. Insulin added in 2022, following hospital stay after subarachnoid hemorrhage. Denies FH of DM. Denies hospitalizations due to DM.   Of note, he has R hemiplegia from past CVA, as well as aphasia.     He is accompanied by his wife, Charisse, who contributes to most of the history.     Patient was last seen by me in August. At that time, metformin was resumed and insulin doses increased.     BG readings are checked 4x/day.            Hypoglycemia: No    Missing Insulin/PO medication doses: Yes - wife has been holding insulin when pre-meal glucose < 100. Glucoses remain normal.   Timing prandial insulin 5-15 minutes before meals: yes    Exercise: No but plans to start outpatient PT    Dietary Habits: Eats 3 meals/day. Occasional snack. Avoids sugary beverages.     Last DM education appointment:      CURRENT DIABETIC MEDS: metformin 500 mg BID, Toujeo 34 units QHS, Novolog 10 units AC + correction scale, target 180, ISF 25  Vial or pen: pen  Glucometer type: Relion    Previous DM treatments:  Metformin   Glimepiride     Last Eye Exam: 11/2021, no DR  Last Podiatry Exam: 1/2022    REVIEW OF SYSTEMS  Constitutional: no c/o weight loss. + fatigue, weakness. In wheelchair.  Cardiac: no palpitations or chest pain.  Respiratory: no cough or dyspnea.  GI: no c/o abdominal pain or nausea. Denies h/o pancreatitis. Reports some occasional abdominal cramping.   Skin: no lesions or rashes.  Neuro: no numbness, tingling, or parasthesias.  Endocrine: denies polyphagia, polydipsia, polyuria      Personally reviewed Past Medical, Surgical, Social History.    Vital Signs  /84   Pulse 60   Ht 6' 3" (1.905 m)   BMI 32.75 " kg/m²     Personally reviewed the below labs:    Hemoglobin A1C   Date Value Ref Range Status   10/14/2022 5.2 4.0 - 5.6 % Final     Comment:     ADA Screening Guidelines:  5.7-6.4%  Consistent with prediabetes  >or=6.5%  Consistent with diabetes    High levels of fetal hemoglobin interfere with the HbA1C  assay. Heterozygous hemoglobin variants (HbS, HgC, etc)do  not significantly interfere with this assay.   However, presence of multiple variants may affect accuracy.     08/10/2022 8.6 (H) 4.0 - 5.6 % Final     Comment:     ADA Screening Guidelines:  5.7-6.4%  Consistent with prediabetes  >or=6.5%  Consistent with diabetes    High levels of fetal hemoglobin interfere with the HbA1C  assay. Heterozygous hemoglobin variants (HbS, HgC, etc)do  not significantly interfere with this assay.   However, presence of multiple variants may affect accuracy.     04/27/2022 5.4 0.0 - 5.6 % Final     Comment:     Reference Interval:  5.0 - 5.6 Normal   5.7 - 6.4 High Risk   > 6.5 Diabetic      Hgb A1c results are standardized based on the (NGSP) National   Glycohemoglobin Standardization Program.      Hemoglobin A1C levels are related to mean serum/plasma glucose   during the preceding 2-3 months.            Chemistry        Component Value Date/Time     08/24/2022 0451    K 4.3 08/24/2022 0451     08/24/2022 0451    CO2 26 08/24/2022 0451    BUN 17 08/24/2022 0451    CREATININE 0.62 08/24/2022 0451     (H) 08/24/2022 0451        Component Value Date/Time    CALCIUM 9.1 08/24/2022 0451    ALKPHOS 89 08/23/2022 0502    AST 22 08/23/2022 0502    ALT 22 08/23/2022 0502    BILITOT 0.8 08/23/2022 0502    ESTGFRAFRICA >60 05/15/2022 0757    EGFRNONAA >60 05/15/2022 0757          Lab Results   Component Value Date    CHOL 139 08/22/2022    CHOL 178 04/27/2022    CHOL 161 02/01/2021     Lab Results   Component Value Date    HDL 30 (L) 08/22/2022    HDL 41 04/27/2022    HDL 32 (L) 02/01/2021     Lab Results   Component  Value Date    LDLCALC 83.0 08/22/2022    LDLCALC 104.0 04/27/2022    LDLCALC 74.2 02/01/2021     Lab Results   Component Value Date    TRIG 130 08/22/2022    TRIG 165 (H) 04/27/2022    TRIG 274 (H) 02/01/2021     Lab Results   Component Value Date    CHOLHDL 21.6 08/22/2022    CHOLHDL 23.0 04/27/2022    CHOLHDL 19.9 (L) 02/01/2021       Lab Results   Component Value Date    MICALBCREAT 7.1 08/10/2022     Lab Results   Component Value Date    TSH 2.250 08/21/2022       CrCl cannot be calculated (Patient's most recent lab result is older than the maximum 7 days allowed.).    No results found for: [id number]      PHYSICAL EXAMINATION  Constitutional: Appears well, no distress, in wheelchair  Neck: Supple, trachea midline.  Respiratory: CTA, even and unlabored.  Cardiovascular: RRR, no murmurs, no carotid bruits.   GI: bowel sounds active, no hernia noted  Skin: warm and dry; no visible wounds  Neuro: expressive aphasia noted.   Feet: appropriate footwear.     Goals         80 <= Glucose <= 180 (pt-stated)       Blood Pressure < 130/80 (pt-stated)       Exercise at least 150 minutes per week.       HEMOGLOBIN A1C < 8.0       Take at least one BP reading per week at various times of the day               Assessment/Plan  1. Type 2 diabetes mellitus with hyperglycemia, without long-term current use of insulin  -- Now controlled. A1c tightly controlled. When wife hold Novolog, glucoses remain normal.   -- discontinue Novolog  -- decrease Toujeo to 32 units nightly  -- change metformin to XR and increase to 750 mg BID. To notify me if having worsening GI issues.   -- BG monitoring 2x/day, alternating times.     -- Discussed diagnosis of DM, A1c goals, progression of disease, long term complications and tx options.    -- Reviewed hypoglycemia management: treat with 4 oz of juice, 4 oz regular soda, or 4 glucose tablets. Monitor and repeat treatment every 15 minutes until BG is >70 Then have a snack, which includes 15 grams  of complex carbohydrates and protein.     -- takes statin, ARB   2. Primary hypertension  -- acceptable.  -- continue current meds   3. History of cardioembolic cerebrovascular accident (CVA)  -- w/R sided hemiplegia  -- avoid hypoglycemia.    4. Seizure disorder  -- avoid hypoglycemia  -- follows with neurology       FOLLOW UP  Follow up in about 3 months (around 1/20/2023).   Patient instructed to bring BG logs to each follow up   Patient encouraged to call for any BG/medication issues, concerns, or questions.    Orders Placed This Encounter   Procedures    Hemoglobin A1C    Comprehensive Metabolic Panel

## 2022-10-21 ENCOUNTER — OFFICE VISIT (OUTPATIENT)
Dept: HOME HEALTH SERVICES | Facility: CLINIC | Age: 67
End: 2022-10-21
Payer: MEDICARE

## 2022-10-21 VITALS
DIASTOLIC BLOOD PRESSURE: 93 MMHG | OXYGEN SATURATION: 98 % | BODY MASS INDEX: 31.66 KG/M2 | HEIGHT: 76 IN | SYSTOLIC BLOOD PRESSURE: 143 MMHG | HEART RATE: 59 BPM | WEIGHT: 260 LBS

## 2022-10-21 DIAGNOSIS — M54.16 LUMBAR RADICULOPATHY: ICD-10-CM

## 2022-10-21 DIAGNOSIS — I70.0 AORTIC ATHEROSCLEROSIS: ICD-10-CM

## 2022-10-21 DIAGNOSIS — Z87.81 HISTORY OF COMPRESSION FRACTURE OF SPINE: ICD-10-CM

## 2022-10-21 DIAGNOSIS — Z91.81 AT HIGH RISK FOR FALLS: ICD-10-CM

## 2022-10-21 DIAGNOSIS — Z95.818 IMPLANTABLE LOOP RECORDER PRESENT: ICD-10-CM

## 2022-10-21 DIAGNOSIS — Z79.4 TYPE 2 DIABETES MELLITUS WITH HYPERGLYCEMIA, WITH LONG-TERM CURRENT USE OF INSULIN: ICD-10-CM

## 2022-10-21 DIAGNOSIS — I10 PRIMARY HYPERTENSION: ICD-10-CM

## 2022-10-21 DIAGNOSIS — I69.359 HEMIPARESIS FOLLOWING CEREBROVASCULAR ACCIDENT (CVA): ICD-10-CM

## 2022-10-21 DIAGNOSIS — G40.909 SEIZURE DISORDER: ICD-10-CM

## 2022-10-21 DIAGNOSIS — E11.51 TYPE II DIABETES MELLITUS WITH PERIPHERAL CIRCULATORY DISORDER: ICD-10-CM

## 2022-10-21 DIAGNOSIS — I65.23 BILATERAL CAROTID ARTERY STENOSIS: ICD-10-CM

## 2022-10-21 DIAGNOSIS — E11.65 TYPE 2 DIABETES MELLITUS WITH HYPERGLYCEMIA, WITH LONG-TERM CURRENT USE OF INSULIN: ICD-10-CM

## 2022-10-21 DIAGNOSIS — H61.23 BILATERAL IMPACTED CERUMEN: ICD-10-CM

## 2022-10-21 DIAGNOSIS — Z74.09 OTHER REDUCED MOBILITY: ICD-10-CM

## 2022-10-21 DIAGNOSIS — Z00.00 ENCOUNTER FOR PREVENTIVE HEALTH EXAMINATION: Primary | ICD-10-CM

## 2022-10-21 DIAGNOSIS — Z86.79 HISTORY OF SUBARACHNOID HEMORRHAGE: ICD-10-CM

## 2022-10-21 DIAGNOSIS — Z86.73 HISTORY OF MULTIPLE STROKES: ICD-10-CM

## 2022-10-21 PROCEDURE — 3072F PR LOW RISK FOR RETINOPATHY: ICD-10-PCS | Mod: CPTII,S$GLB,, | Performed by: NURSE PRACTITIONER

## 2022-10-21 PROCEDURE — 3080F DIAST BP >= 90 MM HG: CPT | Mod: CPTII,S$GLB,, | Performed by: NURSE PRACTITIONER

## 2022-10-21 PROCEDURE — 1160F PR REVIEW ALL MEDS BY PRESCRIBER/CLIN PHARMACIST DOCUMENTED: ICD-10-PCS | Mod: CPTII,S$GLB,, | Performed by: NURSE PRACTITIONER

## 2022-10-21 PROCEDURE — 1159F MED LIST DOCD IN RCRD: CPT | Mod: CPTII,S$GLB,, | Performed by: NURSE PRACTITIONER

## 2022-10-21 PROCEDURE — 3066F NEPHROPATHY DOC TX: CPT | Mod: CPTII,S$GLB,, | Performed by: NURSE PRACTITIONER

## 2022-10-21 PROCEDURE — 1100F PTFALLS ASSESS-DOCD GE2>/YR: CPT | Mod: CPTII,S$GLB,, | Performed by: NURSE PRACTITIONER

## 2022-10-21 PROCEDURE — 3077F PR MOST RECENT SYSTOLIC BLOOD PRESSURE >= 140 MM HG: ICD-10-PCS | Mod: CPTII,S$GLB,, | Performed by: NURSE PRACTITIONER

## 2022-10-21 PROCEDURE — 1100F PR PT FALLS ASSESS DOC 2+ FALLS/FALL W/INJURY/YR: ICD-10-PCS | Mod: CPTII,S$GLB,, | Performed by: NURSE PRACTITIONER

## 2022-10-21 PROCEDURE — 3072F LOW RISK FOR RETINOPATHY: CPT | Mod: CPTII,S$GLB,, | Performed by: NURSE PRACTITIONER

## 2022-10-21 PROCEDURE — 3080F PR MOST RECENT DIASTOLIC BLOOD PRESSURE >= 90 MM HG: ICD-10-PCS | Mod: CPTII,S$GLB,, | Performed by: NURSE PRACTITIONER

## 2022-10-21 PROCEDURE — 3077F SYST BP >= 140 MM HG: CPT | Mod: CPTII,S$GLB,, | Performed by: NURSE PRACTITIONER

## 2022-10-21 PROCEDURE — 3288F PR FALLS RISK ASSESSMENT DOCUMENTED: ICD-10-PCS | Mod: CPTII,S$GLB,, | Performed by: NURSE PRACTITIONER

## 2022-10-21 PROCEDURE — 3066F PR DOCUMENTATION OF TREATMENT FOR NEPHROPATHY: ICD-10-PCS | Mod: CPTII,S$GLB,, | Performed by: NURSE PRACTITIONER

## 2022-10-21 PROCEDURE — 4010F ACE/ARB THERAPY RXD/TAKEN: CPT | Mod: CPTII,S$GLB,, | Performed by: NURSE PRACTITIONER

## 2022-10-21 PROCEDURE — 1160F RVW MEDS BY RX/DR IN RCRD: CPT | Mod: CPTII,S$GLB,, | Performed by: NURSE PRACTITIONER

## 2022-10-21 PROCEDURE — 3044F HG A1C LEVEL LT 7.0%: CPT | Mod: CPTII,S$GLB,, | Performed by: NURSE PRACTITIONER

## 2022-10-21 PROCEDURE — 1159F PR MEDICATION LIST DOCUMENTED IN MEDICAL RECORD: ICD-10-PCS | Mod: CPTII,S$GLB,, | Performed by: NURSE PRACTITIONER

## 2022-10-21 PROCEDURE — 3061F PR NEG MICROALBUMINURIA RESULT DOCUMENTED/REVIEW: ICD-10-PCS | Mod: CPTII,S$GLB,, | Performed by: NURSE PRACTITIONER

## 2022-10-21 PROCEDURE — G0439 PPPS, SUBSEQ VISIT: HCPCS | Mod: S$GLB,,, | Performed by: NURSE PRACTITIONER

## 2022-10-21 PROCEDURE — 3044F PR MOST RECENT HEMOGLOBIN A1C LEVEL <7.0%: ICD-10-PCS | Mod: CPTII,S$GLB,, | Performed by: NURSE PRACTITIONER

## 2022-10-21 PROCEDURE — G0439 PR MEDICARE ANNUAL WELLNESS SUBSEQUENT VISIT: ICD-10-PCS | Mod: S$GLB,,, | Performed by: NURSE PRACTITIONER

## 2022-10-21 PROCEDURE — 3288F FALL RISK ASSESSMENT DOCD: CPT | Mod: CPTII,S$GLB,, | Performed by: NURSE PRACTITIONER

## 2022-10-21 PROCEDURE — 4010F PR ACE/ARB THEARPY RXD/TAKEN: ICD-10-PCS | Mod: CPTII,S$GLB,, | Performed by: NURSE PRACTITIONER

## 2022-10-21 PROCEDURE — 3061F NEG MICROALBUMINURIA REV: CPT | Mod: CPTII,S$GLB,, | Performed by: NURSE PRACTITIONER

## 2022-10-23 PROBLEM — E11.65 TYPE 2 DIABETES MELLITUS WITH HYPERGLYCEMIA, WITH LONG-TERM CURRENT USE OF INSULIN: Status: ACTIVE | Noted: 2019-10-31

## 2022-10-23 PROBLEM — I70.0 AORTIC ATHEROSCLEROSIS: Status: ACTIVE | Noted: 2022-10-23

## 2022-10-23 PROBLEM — Z95.818 IMPLANTABLE LOOP RECORDER PRESENT: Status: ACTIVE | Noted: 2022-10-23

## 2022-10-23 PROBLEM — Z79.4 TYPE 2 DIABETES MELLITUS WITHOUT COMPLICATION, WITH LONG-TERM CURRENT USE OF INSULIN: Status: ACTIVE | Noted: 2019-10-31

## 2022-10-23 PROBLEM — Z74.09 OTHER REDUCED MOBILITY: Status: ACTIVE | Noted: 2022-10-23

## 2022-10-23 PROBLEM — Z87.81 HISTORY OF COMPRESSION FRACTURE OF SPINE: Status: ACTIVE | Noted: 2022-10-23

## 2022-10-23 PROBLEM — S32.000A COMPRESSION FRACTURE OF LUMBAR VERTEBRA: Status: RESOLVED | Noted: 2021-06-30 | Resolved: 2022-10-23

## 2022-10-23 NOTE — PATIENT INSTRUCTIONS
Counseling and Referral of Other Preventative  (Italic type indicates deductible and co-insurance are waived)    Patient Name: Samuel Cast  Today's Date: 10/23/2022    Health Maintenance       Date Due Completion Date    HIV Screening Never done ---    Shingles Vaccine (1 of 2) Never done ---    Pneumococcal Vaccines (Age 65+) (2 - PCV) 02/04/2021 2/4/2020    COVID-19 Vaccine (4 - Booster for Pfizer series) 12/30/2021 11/4/2021    Influenza Vaccine (1) 09/01/2022 10/27/2021    Abdominal Aortic Aneurysm Screening Never done ---    Eye Exam 11/19/2022 11/19/2021    Foot Exam 01/03/2023 1/3/2022    Hemoglobin A1c 04/14/2023 10/14/2022    Diabetes Urine Screening 08/10/2023 8/10/2022    Lipid Panel 08/22/2023 8/22/2022    High Dose Statin 10/21/2023 10/21/2022    Colorectal Cancer Screening 10/21/2025 10/21/2015 (Done)    Override on 10/21/2015: Done    TETANUS VACCINE 06/02/2031 6/2/2021        Orders Placed This Encounter   Procedures    DXA Bone Density Spine And Hip    Ambulatory referral/consult to ENT       The following information is provided to all patients.  This information is to help you find resources for any of the problems found today that may be affecting your health:                Living healthy guide: www.[facility].[place].gov      Understanding Diabetes: www.diabetes.org      Eating healthy: www.cdc.gov/healthyweight      CDC home safety checklist: www.cdc.gov/steadi/patient.html      Agency on Aging: www.goea.[place].[facility]      Alcoholics anonymous (AA): www.aa.org      Physical Activity: www.cal.nih.gov/hy6dpye      Tobacco use: www.quitwithusla.org

## 2022-10-23 NOTE — PROGRESS NOTES
"  Samuel Cast presented for a  Medicare AWV and comprehensive Health Risk Assessment today. The following components were reviewed and updated:    Medical history  Family History  Social history  Allergies and Current Medications  Health Risk Assessment  Health Maintenance  Care Team         ** See Completed Assessments for Annual Wellness Visit within the encounter summary.**         The following assessments were completed:  Living Situation  CAGE  Depression Screening  Timed Get Up and Go  Whisper Test  Cognitive Function Screening  Nutrition Screening  ADL Screening  PAQ Screening        Vitals:    10/21/22 1230   BP: (!) 143/93   Pulse: (!) 59   SpO2: 98%   Weight: 117.9 kg (260 lb)   Height: 6' 4" (1.93 m)     Body mass index is 31.65 kg/m².  Physical Exam  Constitutional:       Appearance: Normal appearance.   HENT:      Head: Normocephalic and atraumatic.      Nose: Nose normal.   Cardiovascular:      Rate and Rhythm: Normal rate and regular rhythm.      Pulses: Normal pulses.      Heart sounds: Normal heart sounds.   Pulmonary:      Effort: Pulmonary effort is normal.      Breath sounds: Normal breath sounds.   Musculoskeletal:      Cervical back: Normal range of motion and neck supple.      Comments: Right sided decreased movement of arm, hand, leg and foot 2/2 stroke   Wearing right AFO brace   Neurological:      Mental Status: He is alert.             Diagnoses and health risks identified today and associated recommendations/orders:    1. Encounter for preventive health examination  Awv completed    2. Bilateral impacted cerumen  Chronic and stable. Continue current treatment. Follow with PCP.    - Ambulatory referral/consult to ENT; Future    3. History of compression fracture of spine  Chronic and stable. Continue current treatment. Follow with PCP.    - DXA Bone Density Spine And Hip; Future    4. Other reduced mobility  Chronic and stable. Continue current treatment. Follow with PCP.  Using sunitha " walker     5. Seizure disorder  Chronic and stable. Continue current treatment. Follow with PCP.  On meds   Seeing neurology     6. History of subarachnoid hemorrhage  Chronic and stable. Continue current treatment. Follow with PCP.  2/2 fall      7. Lumbar radiculopathy  Chronic and stable. Continue current treatment. Follow with PCP.      8. History of multiple strokes  Chronic and stable. Continue current treatment. Follow with PCP.  On plavix and asa and statin     9. Primary hypertension  Chronic and stable. Continue current treatment. Follow with PCP.      10. Bilateral carotid artery stenosis  Chronic and stable. Continue current treatment. Follow with PCP.  Seeing neurosugery for evaluation       11. Aortic atherosclerosis  Chronic and stable. Continue current treatment. Follow with PCP.  Plavix, asa and statin      12. Type II diabetes mellitus with peripheral circulatory disorder  Chronic and stable. Continue current treatment. Follow with PCP.  Seeing endocrine - better control       13. Type 2 diabetes mellitus with hyperglycemia, with long-term current use of insulin  Chronic and stable. Continue current treatment. Follow with PCP.      14. At high risk for falls  Chronic and stable. Continue current treatment. Follow with PCP.  Lives with wife who provides care      15. Implantable loop recorder present  Chronic and stable. Continue current treatment. Follow with PCP.      16. Hemiparesis following cerebrovascular accident (CVA)  Chronic and stable. Continue current treatment. Follow with PCP.      Provided Samuel with a 5-10 year written screening schedule and personal prevention plan. Recommendations were developed using the USPSTF age appropriate recommendations. Education, counseling, and referrals were provided as needed. After Visit Summary printed and given to patient which includes a list of additional screenings\tests needed.    Fu in 1yr for ALEX Ochoa NP  I offered to  discuss advanced care planning, including how to pick a person who would make decisions for you if you were unable to make them for yourself, called a health care power of , and what kind of decisions you might make such as use of life sustaining treatments such as ventilators and tube feeding when faced with a life limiting illness recorded on a living will that they will need to know. (How you want to be cared for as you near the end of your natural life)     X Patient is interested in learning more about how to make advanced directives.  I provided them paperwork and offered to discuss this with them.

## 2022-10-25 ENCOUNTER — DOCUMENT SCAN (OUTPATIENT)
Dept: HOME HEALTH SERVICES | Facility: HOSPITAL | Age: 67
End: 2022-10-25
Payer: MEDICARE

## 2022-10-27 ENCOUNTER — OFFICE VISIT (OUTPATIENT)
Dept: NEUROSURGERY | Facility: CLINIC | Age: 67
End: 2022-10-27
Payer: MEDICARE

## 2022-10-27 VITALS
HEART RATE: 69 BPM | HEIGHT: 76 IN | RESPIRATION RATE: 18 BRPM | BODY MASS INDEX: 31.65 KG/M2 | SYSTOLIC BLOOD PRESSURE: 118 MMHG | DIASTOLIC BLOOD PRESSURE: 71 MMHG | WEIGHT: 259.94 LBS

## 2022-10-27 DIAGNOSIS — I65.21 STENOSIS OF RIGHT CAROTID ARTERY: Primary | ICD-10-CM

## 2022-10-27 PROCEDURE — 3072F PR LOW RISK FOR RETINOPATHY: ICD-10-PCS | Mod: CPTII,S$GLB,, | Performed by: NEUROLOGICAL SURGERY

## 2022-10-27 PROCEDURE — 1159F PR MEDICATION LIST DOCUMENTED IN MEDICAL RECORD: ICD-10-PCS | Mod: CPTII,S$GLB,, | Performed by: NEUROLOGICAL SURGERY

## 2022-10-27 PROCEDURE — 4010F PR ACE/ARB THEARPY RXD/TAKEN: ICD-10-PCS | Mod: CPTII,S$GLB,, | Performed by: NEUROLOGICAL SURGERY

## 2022-10-27 PROCEDURE — 3072F LOW RISK FOR RETINOPATHY: CPT | Mod: CPTII,S$GLB,, | Performed by: NEUROLOGICAL SURGERY

## 2022-10-27 PROCEDURE — 3074F PR MOST RECENT SYSTOLIC BLOOD PRESSURE < 130 MM HG: ICD-10-PCS | Mod: CPTII,S$GLB,, | Performed by: NEUROLOGICAL SURGERY

## 2022-10-27 PROCEDURE — 3061F PR NEG MICROALBUMINURIA RESULT DOCUMENTED/REVIEW: ICD-10-PCS | Mod: CPTII,S$GLB,, | Performed by: NEUROLOGICAL SURGERY

## 2022-10-27 PROCEDURE — 3066F NEPHROPATHY DOC TX: CPT | Mod: CPTII,S$GLB,, | Performed by: NEUROLOGICAL SURGERY

## 2022-10-27 PROCEDURE — 3008F PR BODY MASS INDEX (BMI) DOCUMENTED: ICD-10-PCS | Mod: CPTII,S$GLB,, | Performed by: NEUROLOGICAL SURGERY

## 2022-10-27 PROCEDURE — 3074F SYST BP LT 130 MM HG: CPT | Mod: CPTII,S$GLB,, | Performed by: NEUROLOGICAL SURGERY

## 2022-10-27 PROCEDURE — 1100F PR PT FALLS ASSESS DOC 2+ FALLS/FALL W/INJURY/YR: ICD-10-PCS | Mod: CPTII,S$GLB,, | Performed by: NEUROLOGICAL SURGERY

## 2022-10-27 PROCEDURE — 3288F PR FALLS RISK ASSESSMENT DOCUMENTED: ICD-10-PCS | Mod: CPTII,S$GLB,, | Performed by: NEUROLOGICAL SURGERY

## 2022-10-27 PROCEDURE — 1100F PTFALLS ASSESS-DOCD GE2>/YR: CPT | Mod: CPTII,S$GLB,, | Performed by: NEUROLOGICAL SURGERY

## 2022-10-27 PROCEDURE — 3288F FALL RISK ASSESSMENT DOCD: CPT | Mod: CPTII,S$GLB,, | Performed by: NEUROLOGICAL SURGERY

## 2022-10-27 PROCEDURE — 3078F DIAST BP <80 MM HG: CPT | Mod: CPTII,S$GLB,, | Performed by: NEUROLOGICAL SURGERY

## 2022-10-27 PROCEDURE — 3078F PR MOST RECENT DIASTOLIC BLOOD PRESSURE < 80 MM HG: ICD-10-PCS | Mod: CPTII,S$GLB,, | Performed by: NEUROLOGICAL SURGERY

## 2022-10-27 PROCEDURE — 3044F PR MOST RECENT HEMOGLOBIN A1C LEVEL <7.0%: ICD-10-PCS | Mod: CPTII,S$GLB,, | Performed by: NEUROLOGICAL SURGERY

## 2022-10-27 PROCEDURE — 99214 OFFICE O/P EST MOD 30 MIN: CPT | Mod: S$GLB,,, | Performed by: NEUROLOGICAL SURGERY

## 2022-10-27 PROCEDURE — 3008F BODY MASS INDEX DOCD: CPT | Mod: CPTII,S$GLB,, | Performed by: NEUROLOGICAL SURGERY

## 2022-10-27 PROCEDURE — 3061F NEG MICROALBUMINURIA REV: CPT | Mod: CPTII,S$GLB,, | Performed by: NEUROLOGICAL SURGERY

## 2022-10-27 PROCEDURE — 1159F MED LIST DOCD IN RCRD: CPT | Mod: CPTII,S$GLB,, | Performed by: NEUROLOGICAL SURGERY

## 2022-10-27 PROCEDURE — 99214 PR OFFICE/OUTPT VISIT, EST, LEVL IV, 30-39 MIN: ICD-10-PCS | Mod: S$GLB,,, | Performed by: NEUROLOGICAL SURGERY

## 2022-10-27 PROCEDURE — 3044F HG A1C LEVEL LT 7.0%: CPT | Mod: CPTII,S$GLB,, | Performed by: NEUROLOGICAL SURGERY

## 2022-10-27 PROCEDURE — 4010F ACE/ARB THERAPY RXD/TAKEN: CPT | Mod: CPTII,S$GLB,, | Performed by: NEUROLOGICAL SURGERY

## 2022-10-27 PROCEDURE — 3066F PR DOCUMENTATION OF TREATMENT FOR NEPHROPATHY: ICD-10-PCS | Mod: CPTII,S$GLB,, | Performed by: NEUROLOGICAL SURGERY

## 2022-10-27 NOTE — PROGRESS NOTES
Neurosurgery History & Physical    Patient ID: Samuel Cast is a 67 y.o. male.    Chief Complaint   Patient presents with    AVM     Patient presents to clinic with bilateral carotid artery stenosis; AVM. Reports having daily headaches about 2/10.       HPI:  67 year old male with history of DM, HTN, left ischemic stroke in 2007.  He is paralyzed on the right hand side and has an expressive aphasia.  It is unclear of the etiology of that stroke.    He sustained new stroke on the right hemisphere in 8/20222.  At that time he was not on any antiplatelet agents due to a prior brain bleed after a fall.  He has since been started on ASA 81 mg.    MRI of the brain revealed right frontal infarct.  CTA of the neck revealed right ICA origin stenosis.  He is referred for further discussion of carotid stenosis.    Review of Systems  Denies F/C/N/V    Past Medical History:   Diagnosis Date    Diabetes mellitus, type 2     Hypertension     Seizures     no seizure 2008    Stroke      Social History     Socioeconomic History    Marital status:    Tobacco Use    Smoking status: Never    Smokeless tobacco: Never   Substance and Sexual Activity    Alcohol use: Not Currently     Comment: used to drink 16beer a day for 30 yrs.    Drug use: Not Currently     Social Determinants of Health     Financial Resource Strain: Low Risk     Difficulty of Paying Living Expenses: Not hard at all   Food Insecurity: No Food Insecurity    Worried About Running Out of Food in the Last Year: Never true    Ran Out of Food in the Last Year: Never true   Transportation Needs: No Transportation Needs    Lack of Transportation (Medical): No    Lack of Transportation (Non-Medical): No   Physical Activity: Inactive    Days of Exercise per Week: 0 days    Minutes of Exercise per Session: 0 min   Stress: No Stress Concern Present    Feeling of Stress : Not at all   Social Connections: Moderately Isolated    Frequency of Communication with Friends and  Family: Once a week    Frequency of Social Gatherings with Friends and Family: Once a week    Attends Christianity Services: More than 4 times per year    Active Member of Clubs or Organizations: No    Attends Club or Organization Meetings: Never    Marital Status:    Housing Stability: Low Risk     Unable to Pay for Housing in the Last Year: No    Number of Places Lived in the Last Year: 2    Unstable Housing in the Last Year: No     Family History   Problem Relation Age of Onset    Cancer Mother         pancreatic cancer    Cancer Father 70        colon cancer    Glaucoma Neg Hx      Review of patient's allergies indicates:  No Known Allergies    Current Outpatient Medications:     aspirin 81 MG Chew, Take 1 tablet (81 mg total) by mouth once daily., Disp: 30 tablet, Rfl: 2    atorvastatin (LIPITOR) 80 MG tablet, Take 1 tablet (80 mg total) by mouth once daily., Disp: 90 tablet, Rfl: 3    blood sugar diagnostic Strp, To check BG 3-4 times daily, to use with insurance preferred meter. E11.65, Disp: 200 strip, Rfl: 1    blood-glucose meter kit, To check BG 3-4 times daily, to use with insurance preferred meter. E11.65, Disp: 1 each, Rfl: 1    clopidogreL (PLAVIX) 75 mg tablet, Take 1 tablet (75 mg total) by mouth once daily. Take plavix daily for only 21 days then stop per neurology recommendations, Disp: 90 tablet, Rfl: 0    insulin glargine, TOUJEO, (TOUJEO SOLOSTAR U-300 INSULIN) 300 unit/mL (1.5 mL) InPn pen, Inject 32 Units into the skin every evening., Disp: 3 pen, Rfl: 6    lancets Misc, To check BG 3-4 times daily, to use with insurance preferred meter. E11.65, Disp: 200 each, Rfl: 1    levETIRAcetam (KEPPRA) 750 MG Tab, Take 1 tablet (750 mg total) by mouth 2 (two) times daily., Disp: 180 tablet, Rfl: 3    losartan (COZAAR) 25 MG tablet, Take 4 tablets (100 mg total) by mouth once daily., Disp: 90 tablet, Rfl: 3    magnesium oxide (MAG-OX) 400 mg (241.3 mg magnesium) tablet, Take 1 tablet (400 mg total)  "by mouth once daily., Disp: 30 tablet, Rfl: 1    metFORMIN (GLUCOPHAGE-XR) 750 MG ER 24hr tablet, Take 1 tablet (750 mg total) by mouth 2 (two) times daily with meals., Disp: 180 tablet, Rfl: 3    metoprolol tartrate (LOPRESSOR) 50 MG tablet, Take 2 tablets (100 mg total) by mouth 2 (two) times a day., Disp: 360 tablet, Rfl: 3    pantoprazole (PROTONIX) 20 MG tablet, Take 1 tablet (20 mg total) by mouth once daily. (Patient taking differently: Take 20 mg by mouth every evening.), Disp: 90 tablet, Rfl: 3    pen needle, diabetic 32 gauge x 5/32" Ndle, Use with insulin pen as prescribed. E11.65, Disp: 200 each, Rfl: 1    sertraline (ZOLOFT) 100 MG tablet, Take 1 tablet (100 mg total) by mouth once daily., Disp: 90 tablet, Rfl: 3    TRUEPLUS LANCETS 28 gauge Misc, Apply topically., Disp: , Rfl:   Blood pressure 118/71, pulse 69, resp. rate 18, height 6' 4" (1.93 m), weight 117.9 kg (259 lb 14.8 oz).      Neurologic Exam  AAOx, NAD  Follows commands well  Right upper and lower extremity weakness arm > leg   Strength 5/5 in the left upper and lower extremity        Imaging:  CTA of the head and neck dated 8/22/2022 is personally reviewed and discussed with the patient.  There is a > 70% stenosis of the right ICA at the origin.  The bifurcation is high and the stenosis in the ICA travels above the C2-3 level.    Mri of the brain dated 8/21/2022 is personally reviewed and discussed with the patient.  There is an acute right infarct in the frontal lobe.    Assessment/Plan:   67 year old male with symptomatic right ICA stenosis > 70%.  I discussed that given high grade symptomatic ICA stenosis I would recommend revascularization of the carotid artery.  Given the high location of his stenosis I would favor carotid stenting over endarterectomy.    I discussed risks of carotid stenting including, but not limited to bleeding, pain, weakness numbness, stroke, speech difficulties, vision loss, groin hematoma, and others.    After " thorough discussion he elected to proceed with stenting.

## 2022-10-28 ENCOUNTER — PATIENT MESSAGE (OUTPATIENT)
Dept: FAMILY MEDICINE | Facility: CLINIC | Age: 67
End: 2022-10-28
Payer: MEDICARE

## 2022-10-28 DIAGNOSIS — I60.9 SUBARACHNOID HEMORRHAGE: Primary | ICD-10-CM

## 2022-11-10 ENCOUNTER — TELEPHONE (OUTPATIENT)
Dept: OPTOMETRY | Facility: CLINIC | Age: 67
End: 2022-11-10
Payer: MEDICARE

## 2022-11-14 ENCOUNTER — LAB VISIT (OUTPATIENT)
Dept: LAB | Facility: HOSPITAL | Age: 67
End: 2022-11-14
Attending: NEUROLOGICAL SURGERY
Payer: MEDICARE

## 2022-11-14 DIAGNOSIS — I60.9 SUBARACHNOID HEMORRHAGE: ICD-10-CM

## 2022-11-14 LAB
ALBUMIN SERPL BCP-MCNC: 3.7 G/DL (ref 3.5–5.2)
ALP SERPL-CCNC: 73 U/L (ref 55–135)
ALT SERPL W/O P-5'-P-CCNC: 23 U/L (ref 10–44)
ANION GAP SERPL CALC-SCNC: 8 MMOL/L (ref 8–16)
AST SERPL-CCNC: 20 U/L (ref 10–40)
BASOPHILS # BLD AUTO: 0.04 K/UL (ref 0–0.2)
BASOPHILS NFR BLD: 0.6 % (ref 0–1.9)
BILIRUB SERPL-MCNC: 0.7 MG/DL (ref 0.1–1)
BUN SERPL-MCNC: 13 MG/DL (ref 8–23)
CALCIUM SERPL-MCNC: 9.2 MG/DL (ref 8.7–10.5)
CHLORIDE SERPL-SCNC: 109 MMOL/L (ref 95–110)
CO2 SERPL-SCNC: 22 MMOL/L (ref 23–29)
CREAT SERPL-MCNC: 0.7 MG/DL (ref 0.5–1.4)
DIFFERENTIAL METHOD: ABNORMAL
EOSINOPHIL # BLD AUTO: 0.1 K/UL (ref 0–0.5)
EOSINOPHIL NFR BLD: 2.3 % (ref 0–8)
ERYTHROCYTE [DISTWIDTH] IN BLOOD BY AUTOMATED COUNT: 12.6 % (ref 11.5–14.5)
EST. GFR  (NO RACE VARIABLE): >60 ML/MIN/1.73 M^2
GLUCOSE SERPL-MCNC: 91 MG/DL (ref 70–110)
HCT VFR BLD AUTO: 42.5 % (ref 40–54)
HGB BLD-MCNC: 14.4 G/DL (ref 14–18)
IMM GRANULOCYTES # BLD AUTO: 0.01 K/UL (ref 0–0.04)
IMM GRANULOCYTES NFR BLD AUTO: 0.2 % (ref 0–0.5)
INR PPP: 1.1 (ref 0.8–1.2)
LYMPHOCYTES # BLD AUTO: 1.6 K/UL (ref 1–4.8)
LYMPHOCYTES NFR BLD: 25.3 % (ref 18–48)
MCH RBC QN AUTO: 31.4 PG (ref 27–31)
MCHC RBC AUTO-ENTMCNC: 33.9 G/DL (ref 32–36)
MCV RBC AUTO: 93 FL (ref 82–98)
MONOCYTES # BLD AUTO: 0.4 K/UL (ref 0.3–1)
MONOCYTES NFR BLD: 6.6 % (ref 4–15)
NEUTROPHILS # BLD AUTO: 4 K/UL (ref 1.8–7.7)
NEUTROPHILS NFR BLD: 65 % (ref 38–73)
NRBC BLD-RTO: 0 /100 WBC
PLATELET # BLD AUTO: 190 K/UL (ref 150–450)
PMV BLD AUTO: 11.2 FL (ref 9.2–12.9)
POTASSIUM SERPL-SCNC: 4.2 MMOL/L (ref 3.5–5.1)
PROT SERPL-MCNC: 6.9 G/DL (ref 6–8.4)
PROTHROMBIN TIME: 11.4 SEC (ref 9–12.5)
RBC # BLD AUTO: 4.59 M/UL (ref 4.6–6.2)
SODIUM SERPL-SCNC: 139 MMOL/L (ref 136–145)
WBC # BLD AUTO: 6.21 K/UL (ref 3.9–12.7)

## 2022-11-14 PROCEDURE — 80053 COMPREHEN METABOLIC PANEL: CPT | Performed by: NEUROLOGICAL SURGERY

## 2022-11-14 PROCEDURE — 85025 COMPLETE CBC W/AUTO DIFF WBC: CPT | Performed by: NEUROLOGICAL SURGERY

## 2022-11-14 PROCEDURE — 85610 PROTHROMBIN TIME: CPT | Mod: PO | Performed by: NEUROLOGICAL SURGERY

## 2022-11-14 PROCEDURE — 36415 COLL VENOUS BLD VENIPUNCTURE: CPT | Mod: PO | Performed by: NEUROLOGICAL SURGERY

## 2022-11-17 ENCOUNTER — DOCUMENTATION ONLY (OUTPATIENT)
Dept: PREADMISSION TESTING | Facility: HOSPITAL | Age: 67
End: 2022-11-17
Payer: MEDICARE

## 2022-11-17 NOTE — PRE-PROCEDURE INSTRUCTIONS
PRE-OP INSTRUCTIONS TO PATIENT'S WIFE:  Instructed patient to have no food,milk or milk products after midnight   It is ok to take AM medications with a few sips of water   Medication instructions for pm prior to and am of surgery reviewed.  Instructed patient to avoid taking vitamins,supplements or ibuprofen the am of surgery.  Shower instructions provided    Patient's wife denies patient having any side effects or issues with anesthesia or sedation.      PT HAS LOOP RECORDER

## 2022-11-18 ENCOUNTER — ANESTHESIA (OUTPATIENT)
Dept: INTERVENTIONAL RADIOLOGY/VASCULAR | Facility: HOSPITAL | Age: 67
DRG: 035 | End: 2022-11-18
Payer: MEDICARE

## 2022-11-18 ENCOUNTER — HOSPITAL ENCOUNTER (INPATIENT)
Dept: INTERVENTIONAL RADIOLOGY/VASCULAR | Facility: HOSPITAL | Age: 67
LOS: 1 days | Discharge: HOME | DRG: 035 | End: 2022-11-19
Attending: NEUROLOGICAL SURGERY | Admitting: PSYCHIATRY & NEUROLOGY
Payer: MEDICARE

## 2022-11-18 DIAGNOSIS — I60.9 SUBARACHNOID HEMORRHAGE: ICD-10-CM

## 2022-11-18 DIAGNOSIS — I65.29 CAROTID STENOSIS: ICD-10-CM

## 2022-11-18 LAB
POCT GLUCOSE: 101 MG/DL (ref 70–110)
POCT GLUCOSE: 119 MG/DL (ref 70–110)
POCT GLUCOSE: 80 MG/DL (ref 70–110)

## 2022-11-18 PROCEDURE — D9220A PRA ANESTHESIA: ICD-10-PCS | Mod: CRNA,,, | Performed by: REGISTERED NURSE

## 2022-11-18 PROCEDURE — 63600175 PHARM REV CODE 636 W HCPCS

## 2022-11-18 PROCEDURE — D9220A PRA ANESTHESIA: Mod: ANES,,, | Performed by: ANESTHESIOLOGY

## 2022-11-18 PROCEDURE — 63600175 PHARM REV CODE 636 W HCPCS: Performed by: REGISTERED NURSE

## 2022-11-18 PROCEDURE — 27201082 IR ANGIOGRAM CAROTID INTERNAL INC ARCH AND CEREBRAL BILAT

## 2022-11-18 PROCEDURE — G0269 OCCLUSIVE DEVICE IN VEIN ART: HCPCS | Performed by: NEUROLOGICAL SURGERY

## 2022-11-18 PROCEDURE — 25000003 PHARM REV CODE 250: Performed by: REGISTERED NURSE

## 2022-11-18 PROCEDURE — 63600175 PHARM REV CODE 636 W HCPCS: Performed by: NURSE PRACTITIONER

## 2022-11-18 PROCEDURE — 20000000 HC ICU ROOM

## 2022-11-18 PROCEDURE — 37000009 HC ANESTHESIA EA ADD 15 MINS

## 2022-11-18 PROCEDURE — 37215 IR ANGIOGRAM CAROTID INTERNAL INC ARCH AND CEREBRAL BILAT: ICD-10-PCS | Mod: RT,GC,, | Performed by: NEUROLOGICAL SURGERY

## 2022-11-18 PROCEDURE — 82962 GLUCOSE BLOOD TEST: CPT

## 2022-11-18 PROCEDURE — 37215 TRANSCATH STENT CCA W/EPS: CPT | Performed by: NEUROLOGICAL SURGERY

## 2022-11-18 PROCEDURE — 37000008 HC ANESTHESIA 1ST 15 MINUTES

## 2022-11-18 PROCEDURE — D9220A PRA ANESTHESIA: Mod: CRNA,,, | Performed by: REGISTERED NURSE

## 2022-11-18 PROCEDURE — D9220A PRA ANESTHESIA: ICD-10-PCS | Mod: ANES,,, | Performed by: ANESTHESIOLOGY

## 2022-11-18 PROCEDURE — 25000003 PHARM REV CODE 250: Performed by: NEUROLOGICAL SURGERY

## 2022-11-18 PROCEDURE — 25000003 PHARM REV CODE 250: Performed by: FAMILY MEDICINE

## 2022-11-18 PROCEDURE — 25000003 PHARM REV CODE 250

## 2022-11-18 PROCEDURE — 37215 TRANSCATH STENT CCA W/EPS: CPT | Mod: RT,GC,, | Performed by: NEUROLOGICAL SURGERY

## 2022-11-18 RX ORDER — SODIUM CHLORIDE 0.9 % (FLUSH) 0.9 %
10 SYRINGE (ML) INJECTION
Status: DISCONTINUED | OUTPATIENT
Start: 2022-11-18 | End: 2022-11-19 | Stop reason: HOSPADM

## 2022-11-18 RX ORDER — ROCURONIUM BROMIDE 10 MG/ML
INJECTION, SOLUTION INTRAVENOUS
Status: DISPENSED
Start: 2022-11-18 | End: 2022-11-19

## 2022-11-18 RX ORDER — LANOLIN ALCOHOL/MO/W.PET/CERES
800 CREAM (GRAM) TOPICAL
Status: DISCONTINUED | OUTPATIENT
Start: 2022-11-18 | End: 2022-11-19 | Stop reason: HOSPADM

## 2022-11-18 RX ORDER — METFORMIN HYDROCHLORIDE 750 MG/1
750 TABLET, EXTENDED RELEASE ORAL 2 TIMES DAILY WITH MEALS
Status: DISCONTINUED | OUTPATIENT
Start: 2022-11-18 | End: 2022-11-18

## 2022-11-18 RX ORDER — FENTANYL CITRATE 50 UG/ML
25 INJECTION, SOLUTION INTRAMUSCULAR; INTRAVENOUS EVERY 5 MIN PRN
Status: DISCONTINUED | OUTPATIENT
Start: 2022-11-18 | End: 2022-11-19 | Stop reason: HOSPADM

## 2022-11-18 RX ORDER — LABETALOL HCL 20 MG/4 ML
10 SYRINGE (ML) INTRAVENOUS
Status: DISCONTINUED | OUTPATIENT
Start: 2022-11-18 | End: 2022-11-18

## 2022-11-18 RX ORDER — ATORVASTATIN CALCIUM 40 MG/1
80 TABLET, FILM COATED ORAL DAILY
Status: DISCONTINUED | OUTPATIENT
Start: 2022-11-19 | End: 2022-11-19 | Stop reason: HOSPADM

## 2022-11-18 RX ORDER — METOPROLOL TARTRATE 100 MG/1
100 TABLET ORAL 2 TIMES DAILY
Status: DISCONTINUED | OUTPATIENT
Start: 2022-11-18 | End: 2022-11-19 | Stop reason: HOSPADM

## 2022-11-18 RX ORDER — MUPIROCIN 20 MG/G
OINTMENT TOPICAL 2 TIMES DAILY
Status: DISCONTINUED | OUTPATIENT
Start: 2022-11-18 | End: 2022-11-19 | Stop reason: HOSPADM

## 2022-11-18 RX ORDER — AMOXICILLIN 250 MG
1 CAPSULE ORAL 2 TIMES DAILY
Status: DISCONTINUED | OUTPATIENT
Start: 2022-11-18 | End: 2022-11-19 | Stop reason: HOSPADM

## 2022-11-18 RX ORDER — HYDRALAZINE HYDROCHLORIDE 20 MG/ML
INJECTION INTRAMUSCULAR; INTRAVENOUS
Status: COMPLETED
Start: 2022-11-18 | End: 2022-11-18

## 2022-11-18 RX ORDER — LABETALOL HCL 20 MG/4 ML
10 SYRINGE (ML) INTRAVENOUS EVERY 4 HOURS PRN
Status: DISCONTINUED | OUTPATIENT
Start: 2022-11-18 | End: 2022-11-19 | Stop reason: HOSPADM

## 2022-11-18 RX ORDER — LOSARTAN POTASSIUM 50 MG/1
100 TABLET ORAL DAILY
Status: DISCONTINUED | OUTPATIENT
Start: 2022-11-19 | End: 2022-11-19 | Stop reason: HOSPADM

## 2022-11-18 RX ORDER — PROPOFOL 10 MG/ML
VIAL (ML) INTRAVENOUS
Status: DISCONTINUED | OUTPATIENT
Start: 2022-11-18 | End: 2022-11-18

## 2022-11-18 RX ORDER — HEPARIN SODIUM 1000 [USP'U]/ML
INJECTION, SOLUTION INTRAVENOUS; SUBCUTANEOUS
Status: DISCONTINUED | OUTPATIENT
Start: 2022-11-18 | End: 2022-11-18

## 2022-11-18 RX ORDER — NAPROXEN SODIUM 220 MG/1
81 TABLET, FILM COATED ORAL EVERY MORNING
Status: DISCONTINUED | OUTPATIENT
Start: 2022-11-19 | End: 2022-11-19 | Stop reason: HOSPADM

## 2022-11-18 RX ORDER — PANTOPRAZOLE SODIUM 20 MG/1
20 TABLET, DELAYED RELEASE ORAL DAILY
Status: DISCONTINUED | OUTPATIENT
Start: 2022-11-19 | End: 2022-11-19 | Stop reason: HOSPADM

## 2022-11-18 RX ORDER — PHENYLEPHRINE HYDROCHLORIDE 10 MG/ML
INJECTION INTRAVENOUS
Status: DISCONTINUED | OUTPATIENT
Start: 2022-11-18 | End: 2022-11-18

## 2022-11-18 RX ORDER — PROPOFOL 10 MG/ML
VIAL (ML) INTRAVENOUS
Status: DISPENSED
Start: 2022-11-18 | End: 2022-11-19

## 2022-11-18 RX ORDER — MIDAZOLAM HYDROCHLORIDE 1 MG/ML
INJECTION, SOLUTION INTRAMUSCULAR; INTRAVENOUS
Status: DISCONTINUED | OUTPATIENT
Start: 2022-11-18 | End: 2022-11-18

## 2022-11-18 RX ORDER — ETOMIDATE 2 MG/ML
INJECTION INTRAVENOUS
Status: DISPENSED
Start: 2022-11-18 | End: 2022-11-19

## 2022-11-18 RX ORDER — SODIUM,POTASSIUM PHOSPHATES 280-250MG
2 POWDER IN PACKET (EA) ORAL
Status: DISCONTINUED | OUTPATIENT
Start: 2022-11-18 | End: 2022-11-19 | Stop reason: HOSPADM

## 2022-11-18 RX ORDER — SODIUM CHLORIDE 9 MG/ML
INJECTION, SOLUTION INTRAVENOUS CONTINUOUS
Status: DISCONTINUED | OUTPATIENT
Start: 2022-11-18 | End: 2022-11-18

## 2022-11-18 RX ORDER — CLOPIDOGREL BISULFATE 75 MG/1
75 TABLET ORAL DAILY
Status: DISCONTINUED | OUTPATIENT
Start: 2022-11-19 | End: 2022-11-19 | Stop reason: HOSPADM

## 2022-11-18 RX ORDER — GLUCAGON 1 MG
1 KIT INJECTION
Status: DISCONTINUED | OUTPATIENT
Start: 2022-11-18 | End: 2022-11-19 | Stop reason: HOSPADM

## 2022-11-18 RX ORDER — IBUPROFEN 200 MG
16 TABLET ORAL
Status: DISCONTINUED | OUTPATIENT
Start: 2022-11-18 | End: 2022-11-19 | Stop reason: HOSPADM

## 2022-11-18 RX ORDER — HYDRALAZINE HYDROCHLORIDE 20 MG/ML
10 INJECTION INTRAMUSCULAR; INTRAVENOUS EVERY 4 HOURS PRN
Status: DISCONTINUED | OUTPATIENT
Start: 2022-11-18 | End: 2022-11-19 | Stop reason: HOSPADM

## 2022-11-18 RX ORDER — ONDANSETRON 2 MG/ML
4 INJECTION INTRAMUSCULAR; INTRAVENOUS DAILY PRN
Status: DISCONTINUED | OUTPATIENT
Start: 2022-11-18 | End: 2022-11-19 | Stop reason: HOSPADM

## 2022-11-18 RX ORDER — LEVETIRACETAM 750 MG/1
750 TABLET ORAL 2 TIMES DAILY
Status: DISCONTINUED | OUTPATIENT
Start: 2022-11-18 | End: 2022-11-19 | Stop reason: HOSPADM

## 2022-11-18 RX ORDER — PHENYLEPHRINE HCL IN 0.9% NACL 1 MG/10 ML
SYRINGE (ML) INTRAVENOUS
Status: DISPENSED
Start: 2022-11-18 | End: 2022-11-19

## 2022-11-18 RX ORDER — SUCCINYLCHOLINE CHLORIDE 20 MG/ML
INJECTION INTRAMUSCULAR; INTRAVENOUS
Status: DISPENSED
Start: 2022-11-18 | End: 2022-11-19

## 2022-11-18 RX ORDER — PROPOFOL 10 MG/ML
INJECTION, EMULSION INTRAVENOUS CONTINUOUS PRN
Status: DISCONTINUED | OUTPATIENT
Start: 2022-11-18 | End: 2022-11-18

## 2022-11-18 RX ORDER — IBUPROFEN 200 MG
24 TABLET ORAL
Status: DISCONTINUED | OUTPATIENT
Start: 2022-11-18 | End: 2022-11-19 | Stop reason: HOSPADM

## 2022-11-18 RX ORDER — INSULIN ASPART 100 [IU]/ML
1-10 INJECTION, SOLUTION INTRAVENOUS; SUBCUTANEOUS
Status: DISCONTINUED | OUTPATIENT
Start: 2022-11-18 | End: 2022-11-19 | Stop reason: HOSPADM

## 2022-11-18 RX ORDER — SERTRALINE HYDROCHLORIDE 50 MG/1
100 TABLET, FILM COATED ORAL NIGHTLY
Status: DISCONTINUED | OUTPATIENT
Start: 2022-11-18 | End: 2022-11-19 | Stop reason: HOSPADM

## 2022-11-18 RX ADMIN — SODIUM CHLORIDE: 0.9 INJECTION, SOLUTION INTRAVENOUS at 02:11

## 2022-11-18 RX ADMIN — HYDRALAZINE HYDROCHLORIDE 10 MG: 20 INJECTION INTRAMUSCULAR; INTRAVENOUS at 07:11

## 2022-11-18 RX ADMIN — PHENYLEPHRINE HYDROCHLORIDE 100 MCG: 10 INJECTION INTRAVENOUS at 03:11

## 2022-11-18 RX ADMIN — METOPROLOL TARTRATE 100 MG: 100 TABLET, FILM COATED ORAL at 09:11

## 2022-11-18 RX ADMIN — PROPOFOL 85 MCG/KG/MIN: 10 INJECTION, EMULSION INTRAVENOUS at 02:11

## 2022-11-18 RX ADMIN — HEPARIN SODIUM 1000 UNITS: 1000 INJECTION, SOLUTION INTRAVENOUS; SUBCUTANEOUS at 03:11

## 2022-11-18 RX ADMIN — MUPIROCIN: 20 OINTMENT TOPICAL at 09:11

## 2022-11-18 RX ADMIN — MIDAZOLAM HYDROCHLORIDE 2 MG: 1 INJECTION, SOLUTION INTRAMUSCULAR; INTRAVENOUS at 02:11

## 2022-11-18 RX ADMIN — HEPARIN SODIUM 5500 UNITS: 1000 INJECTION, SOLUTION INTRAVENOUS; SUBCUTANEOUS at 02:11

## 2022-11-18 RX ADMIN — HYDRALAZINE HYDROCHLORIDE 10 MG: 20 INJECTION, SOLUTION INTRAMUSCULAR; INTRAVENOUS at 07:11

## 2022-11-18 RX ADMIN — PROPOFOL 20 MG: 10 INJECTION, EMULSION INTRAVENOUS at 02:11

## 2022-11-18 RX ADMIN — SERTRALINE HYDROCHLORIDE 100 MG: 50 TABLET ORAL at 09:11

## 2022-11-18 RX ADMIN — GLYCOPYRROLATE 0.2 MG: 0.2 INJECTION, SOLUTION INTRAMUSCULAR; INTRAVITREAL at 02:11

## 2022-11-18 RX ADMIN — PHENYLEPHRINE HYDROCHLORIDE 100 MCG: 10 INJECTION INTRAVENOUS at 02:11

## 2022-11-18 RX ADMIN — SODIUM CHLORIDE 0.4 MCG/KG/MIN: 9 INJECTION, SOLUTION INTRAVENOUS at 03:11

## 2022-11-18 RX ADMIN — LEVETIRACETAM 750 MG: 750 TABLET, FILM COATED ORAL at 09:11

## 2022-11-18 NOTE — PROCEDURES
Radiology Post-Procedure Note    Pre Op Diagnosis: Right extracranial ICA stenosis      Post Op Diagnosis: See section below     Procedure: Cerebral angiogram    Procedure performed by: ROSALVA SUTHERLAND    Written Informed Consent Obtained: Yes    Specimen Removed: NO    Estimated Blood Loss: Minimal    Procedure report:     A 6F sheath was placed into the right femoral artery and a 5F Jason catheter / Neuromax was advanced into the aortic arch.  The right CCA arteries were subselected and angiography of the brain was performed after injection into each of these vessels.    Preliminary interpretation: Prior described right extracranial proximal ICA stenosis was characterized - performed angioplasty / stenting using Parker monorail / carotid wall stent, with Spiderx distal protection embolic device.     Please see Imaging report for full details.    A right femoral artery angiogram was performed, the sheath removed and hemostasis achieved using perclose.  No hematoma was present at the time of hemostasis.        Ephraim Maloney MD     Neuro Endovascular Surgery Fellow   [facility]

## 2022-11-18 NOTE — H&P
Radiology History & Physical      SUBJECTIVE:     Chief Complaint: Carotid stenosis     History of Present Illness:  Samuel Cast is a 67 y.o. male with medical history of left MCA stroke; HTN, DM with recent evaluation for right MCA stroke revealing high risk; high grade right extracranial ICA proximal stenosis. Hence, presents for cerebral angiogram to characterize the extracranial ICA stenosis +/- angioplasty and stenting for the symptomatic carotid disease.     Past Medical History:   Diagnosis Date    Diabetes mellitus, type 2     Hypertension     Seizures     no seizure 2008    Stroke      Past Surgical History:   Procedure Laterality Date    COLONOSCOPY      EPIDURAL STEROID INJECTION INTO LUMBAR SPINE N/A 7/28/2021    Procedure: Injection-steroid-epidural-lumbar L5/S1;  Surgeon: Alfonso Childers MD;  Location: [facility] OR;  Service: Pain Management;  Laterality: N/A;    FIXATION KYPHOPLASTY N/A 6/30/2021    Procedure: Kyphoplasty L1, L2, L3;  Surgeon: Alfonso Childers MD;  Location: [facility] OR;  Service: Pain Management;  Laterality: N/A;    INSERTION OF IMPLANTABLE LOOP RECORDER N/A 8/24/2022    Procedure: Insertion, Implantable Loop Recorder;  Surgeon: Jeanmarie Andrade MD;  Location: [facility] CATH;  Service: Cardiology;  Laterality: N/A;    KNEE SURGERY         Home Meds:   Prior to Admission medications    Medication Sig Start Date End Date Taking? Authorizing Provider   aspirin 81 MG Chew Take 1 tablet (81 mg total) by mouth once daily.  Patient taking differently: Take 81 mg by mouth every morning. 8/25/22 8/25/23  Lena Vazquez MD   atorvastatin (LIPITOR) 80 MG tablet Take 1 tablet (80 mg total) by mouth once daily.  Patient taking differently: Take 80 mg by mouth every morning. 7/13/22 7/13/23  Ethel Anderson NP   blood sugar diagnostic Strp To check BG 3-4 times daily, to use with insurance preferred meter. E11.65 8/24/22   Lena Vazquez MD   blood-glucose meter kit To check BG 3-4 times daily, to  "use with insurance preferred meter. E11.65 8/24/22 8/24/23  Lena Vazquez MD   clopidogreL (PLAVIX) 75 mg tablet Take 1 tablet (75 mg total) by mouth once daily. Take plavix daily for only 21 days then stop per neurology recommendations  Patient taking differently: Take 75 mg by mouth every evening. Take plavix daily for only 21 days then stop per neurology recommendations 9/7/22 12/6/22  Carolyn Dash, PARISH   insulin glargine, TOUJEO, (TOUJEO SOLOSTAR U-300 INSULIN) 300 unit/mL (1.5 mL) InPn pen Inject 32 Units into the skin every evening. 10/20/22   MARÍA Matute FNP-C   lancets Misc To check BG 3-4 times daily, to use with insurance preferred meter. E11.65 8/24/22   Lena Vazquez MD   levETIRAcetam (KEPPRA) 750 MG Tab Take 1 tablet (750 mg total) by mouth 2 (two) times daily. 7/13/22 7/13/23  Ethel Anderson NP   losartan (COZAAR) 25 MG tablet Take 4 tablets (100 mg total) by mouth once daily.  Patient taking differently: Take 100 mg by mouth every morning. 8/24/22 8/24/23  Lena Vazquez MD   magnesium oxide (MAG-OX) 400 mg (241.3 mg magnesium) tablet Take 1 tablet (400 mg total) by mouth once daily.  Patient taking differently: Take 400 mg by mouth every morning. 8/25/22   Lena Vazquez MD   metFORMIN (GLUCOPHAGE-XR) 750 MG ER 24hr tablet Take 1 tablet (750 mg total) by mouth 2 (two) times daily with meals. 10/20/22 10/20/23  MARÍA Matute FNP-C   metoprolol tartrate (LOPRESSOR) 50 MG tablet Take 2 tablets (100 mg total) by mouth 2 (two) times a day. 8/25/21   Nallely Jurado MD   pantoprazole (PROTONIX) 20 MG tablet Take 1 tablet (20 mg total) by mouth once daily.  Patient taking differently: Take 20 mg by mouth every morning. 8/25/21   Nallely Jurado MD   pen needle, diabetic 32 gauge x 5/32" Ndle Use with insulin pen as prescribed. E11.65 8/24/22   Lena Vazquez MD   sertraline (ZOLOFT) 100 MG tablet Take 1 tablet (100 mg total) by mouth once " daily.  Patient taking differently: Take 100 mg by mouth every morning. 10/27/21 11/17/22  Nallely Jurado MD   TRUEPLUS LANCETS 28 gauge Misc Apply topically. 8/24/22   Historical Provider   amLODIPine (NORVASC) 10 MG tablet Take 1 tablet (10 mg total) by mouth once daily. 7/13/22 8/24/22  Ethel Anderson NP   glimepiride (AMARYL) 4 MG tablet TAKE 1 TABLET (4 MG TOTAL) BY MOUTH BEFORE BREAKFAST. 1/10/22 5/19/22  Nallely Jurado MD   hydrALAZINE (APRESOLINE) 25 MG tablet Take 1 tablet (25 mg total) by mouth every 8 (eight) hours. 7/13/22 8/24/22  Ethel Anderson NP   omega 5-zpk-cfs-fish oil (FISH OIL) 1,000 mg (120 mg-180 mg) Cap Take 1 capsule by mouth every evening.  5/19/22  Historical Provider   rosuvastatin (CRESTOR) 20 MG tablet Take 1 tablet (20 mg total) by mouth once daily.  Patient taking differently: Take 20 mg by mouth every evening. 10/13/21 5/19/22  Nallely Jurado MD   traZODone (DESYREL) 50 MG tablet TAKE 1 TABLET EVERY EVENING 3/10/22 5/19/22  Nallely Jurado MD     Anticoagulants/Antiplatelets: aspirin and Plavix    Allergies: Review of patient's allergies indicates:  No Known Allergies  Sedation History:  no adverse reactions    Review of Systems:   Hematological: no known coagulopathies  Respiratory: no shortness of breath  Cardiovascular: no chest pain  Gastrointestinal: no abdominal pain  Genito-Urinary: no dysuria  Musculoskeletal: negative  Neurological: no TIA or stroke symptoms         OBJECTIVE:     Vital Signs (Most Recent)       Physical Exam:  ASA: 3  Mallampati: 2    General: no acute distress  Mental Status: aphasia   HEENT: normocephalic, atraumatic  Chest: unlabored breathing  Heart: regular heart rate  Abdomen: nondistendeds  Extremity: right hemiparesis     Laboratory  Lab Results   Component Value Date    INR 1.1 11/14/2022       Lab Results   Component Value Date    WBC 6.21 11/14/2022    HGB 14.4 11/14/2022    HCT 42.5 11/14/2022    MCV 93 11/14/2022      11/14/2022      Lab Results   Component Value Date    GLU 91 11/14/2022     11/14/2022    K 4.2 11/14/2022     11/14/2022    CO2 22 (L) 11/14/2022    BUN 13 11/14/2022    CREATININE 0.7 11/14/2022    CALCIUM 9.2 11/14/2022    MG 1.9 08/24/2022    ALT 23 11/14/2022    AST 20 11/14/2022    ALBUMIN 3.7 11/14/2022    BILITOT 0.7 11/14/2022       ASSESSMENT/PLAN:     Sedation Plan: As per anesthesia    Patient will undergo: cerebral angiogram to characterize the extracranial ICA stenosis +/- angioplasty and stenting for the symptomatic carotid disease.           Ephraim Maloney MD     Neuro Endovascular Surgery Fellow   [facility]

## 2022-11-18 NOTE — PLAN OF CARE
Procedure complete. Pt tolerated well. flat for 2hours hemostasis 1620. VSS. Site CDI. Pt transferred to PACU18 and report to be given bedside.

## 2022-11-18 NOTE — TRANSFER OF CARE
"Anesthesia Transfer of Care Note    Patient: Samuel Cast    Procedure(s) Performed: * No procedures listed *    Patient location: PACU    Anesthesia Type: general    Transport from OR: Transported from OR on 6-10 L/min O2 by face mask with adequate spontaneous ventilation    Post pain: adequate analgesia    Post assessment: no apparent anesthetic complications and tolerated procedure well    Post vital signs: stable    Level of consciousness: awake and alert    Nausea/Vomiting: no nausea/vomiting    Complications: none    Transfer of care protocol was followedComments: Nurse at bedside, VSS, spont reg resp noted      Last vitals:   Visit Vitals  BP (!) 166/80 (BP Location: Left arm, Patient Position: Lying)   Pulse 65   Temp 36.4 °C (97.6 °F) (Oral)   Resp 18   Ht 6' 4" (1.93 m)   Wt 117.5 kg (259 lb)   SpO2 99%   BMI 31.53 kg/m²     "

## 2022-11-18 NOTE — PROGRESS NOTES
Patient assigned to this writer by charge nurse. The patient was  escorted to [facility] room 8 via wheelchair accompanied by wife. Moving helped paged.This writer is completing a chart review and reviewing MD orders.

## 2022-11-18 NOTE — PROCEDURES
67 y.o. male with medical history of left MCA stroke; HTN, DM with recent evaluation for right MCA stroke revealing high risk; high grade right extracranial ICA proximal stenosis. Hence, presents for cerebral angiogram to characterize the extracranial ICA stenosis +/- angioplasty and stenting for the symptomatic carotid disease.      Prior described right extracranial proximal ICA stenosis was characterized - performed angioplasty / stenting using [place] monorail / carotid wall stent, with Spiderx distal protection embolic device.     CPT code: 26768         Ephraim Maloney MD     Neuro Endovascular Surgery Fellow   [facility]

## 2022-11-18 NOTE — PLAN OF CARE
Pt arrived to [facility] for carotid stent plcmnt. Pt oriented to unit and staff. Plan of care reviewed with patient, patient verbalizes understanding. Comfort measures utilized. Pt safely transferred from stretcher to procedural table. Fall risk reviewed with patient, fall risk interventions maintained. Safety strap applied, positioner pillows utilized to minimize pressure points. Blankets applied. Pt prepped and draped utilizing standard sterile technique. Patient placed on continuous monitoring, as required by sedation policy. Timeouts completed utilizing standard universal time-out, per department and facility policy. RN to remain at bedside, continuous monitoring maintained. Pt resting comfortably. Denies pain/discomfort. Will continue to monitor. See flow sheets for monitoring, medication administration, and updates.

## 2022-11-18 NOTE — ANESTHESIA PREPROCEDURE EVALUATION
11/18/2022  Samuel Cast is a 67 y.o., male   Pre-operative evaluation for * No procedures listed *    Samuel Cast is a 67 y.o. male     Patient Active Problem List   Diagnosis    Type 2 diabetes mellitus with hyperglycemia, with long-term current use of insulin    HTN (hypertension)    Hemiparesis following cerebrovascular accident (CVA)    Type II diabetes mellitus with peripheral circulatory disorder    Lumbar radiculopathy    Dorsalgia    At high risk for falls    History of subarachnoid hemorrhage    Seizure disorder    A-fib    Right thyroid nodule    History of multiple strokes    Bilateral carotid artery stenosis    History of compression fracture of spine    Other reduced mobility    Aortic atherosclerosis    Implantable loop recorder present       Review of patient's allergies indicates:  No Known Allergies    Current Outpatient Medications on File Prior to Encounter   Medication Sig Dispense Refill    aspirin 81 MG Chew Take 1 tablet (81 mg total) by mouth once daily. (Patient taking differently: Take 81 mg by mouth every morning.) 30 tablet 2    atorvastatin (LIPITOR) 80 MG tablet Take 1 tablet (80 mg total) by mouth once daily. (Patient taking differently: Take 80 mg by mouth every morning.) 90 tablet 3    clopidogreL (PLAVIX) 75 mg tablet Take 1 tablet (75 mg total) by mouth once daily. Take plavix daily for only 21 days then stop per neurology recommendations (Patient taking differently: Take 75 mg by mouth every evening. Take plavix daily for only 21 days then stop per neurology recommendations) 90 tablet 0    insulin glargine, TOUJEO, (TOUJEO SOLOSTAR U-300 INSULIN) 300 unit/mL (1.5 mL) InPn pen Inject 32 Units into the skin every evening. 3 pen 6    levETIRAcetam (KEPPRA) 750 MG Tab Take 1 tablet (750 mg total) by mouth 2 (two) times daily. 180 tablet 3    losartan  "(COZAAR) 25 MG tablet Take 4 tablets (100 mg total) by mouth once daily. (Patient taking differently: Take 100 mg by mouth every morning.) 90 tablet 3    magnesium oxide (MAG-OX) 400 mg (241.3 mg magnesium) tablet Take 1 tablet (400 mg total) by mouth once daily. (Patient taking differently: Take 400 mg by mouth every morning.) 30 tablet 1    metFORMIN (GLUCOPHAGE-XR) 750 MG ER 24hr tablet Take 1 tablet (750 mg total) by mouth 2 (two) times daily with meals. 180 tablet 3    metoprolol tartrate (LOPRESSOR) 50 MG tablet Take 2 tablets (100 mg total) by mouth 2 (two) times a day. 360 tablet 3    pantoprazole (PROTONIX) 20 MG tablet Take 1 tablet (20 mg total) by mouth once daily. (Patient taking differently: Take 20 mg by mouth every morning.) 90 tablet 3    sertraline (ZOLOFT) 100 MG tablet Take 1 tablet (100 mg total) by mouth once daily. (Patient taking differently: Take 100 mg by mouth every morning.) 90 tablet 3    blood sugar diagnostic Strp To check BG 3-4 times daily, to use with insurance preferred meter. E11.65 200 strip 1    blood-glucose meter kit To check BG 3-4 times daily, to use with insurance preferred meter. E11.65 1 each 1    lancets Misc To check BG 3-4 times daily, to use with insurance preferred meter. E11.65 200 each 1    pen needle, diabetic 32 gauge x 5/32" Ndle Use with insulin pen as prescribed. E11.65 200 each 1    TRUEPLUS LANCETS 28 gauge Misc Apply topically.      [DISCONTINUED] amLODIPine (NORVASC) 10 MG tablet Take 1 tablet (10 mg total) by mouth once daily. 90 tablet 3    [DISCONTINUED] glimepiride (AMARYL) 4 MG tablet TAKE 1 TABLET (4 MG TOTAL) BY MOUTH BEFORE BREAKFAST. 90 tablet 2    [DISCONTINUED] hydrALAZINE (APRESOLINE) 25 MG tablet Take 1 tablet (25 mg total) by mouth every 8 (eight) hours. 270 tablet 3    [DISCONTINUED] omega 3-dha-epa-fish oil (FISH OIL) 1,000 mg (120 mg-180 mg) Cap Take 1 capsule by mouth every evening.      [DISCONTINUED] rosuvastatin " (CRESTOR) 20 MG tablet Take 1 tablet (20 mg total) by mouth once daily. (Patient taking differently: Take 20 mg by mouth every evening.) 90 tablet 3    [DISCONTINUED] traZODone (DESYREL) 50 MG tablet TAKE 1 TABLET EVERY EVENING 90 tablet 0     No current facility-administered medications on file prior to encounter.       Past Surgical History:   Procedure Laterality Date    COLONOSCOPY      EPIDURAL STEROID INJECTION INTO LUMBAR SPINE N/A 7/28/2021    Procedure: Injection-steroid-epidural-lumbar L5/S1;  Surgeon: Alfonso Childers MD;  Location: [facility] OR;  Service: Pain Management;  Laterality: N/A;    FIXATION KYPHOPLASTY N/A 6/30/2021    Procedure: Kyphoplasty L1, L2, L3;  Surgeon: Alfonso Childers MD;  Location: [facility] OR;  Service: Pain Management;  Laterality: N/A;    INSERTION OF IMPLANTABLE LOOP RECORDER N/A 8/24/2022    Procedure: Insertion, Implantable Loop Recorder;  Surgeon: Jeanmarie Andrade MD;  Location: [facility];  Service: Cardiology;  Laterality: N/A;    KNEE SURGERY         Pre-op Assessment    I have reviewed the Patient Summary Reports.     I have reviewed the Nursing Notes. I have reviewed the NPO Status.   I have reviewed the Medications.     Review of Systems  Anesthesia Hx:  No problems with previous Anesthesia Denies Hx of Anesthetic complications  History of prior surgery of interest to airway management or planning: Denies Family Hx of Anesthesia complications.   Denies Personal Hx of Anesthesia complications.   Social:  No Alcohol Use, Non-Smoker    Hematology/Oncology:  Hematology Normal   Oncology Normal     EENT/Dental:EENT/Dental Normal   Cardiovascular:   Exercise tolerance: good Hypertension    Pulmonary:  Pulmonary Normal    Renal/:  Renal/ Normal     Hepatic/GI:  Hepatic/GI Normal    Neurological:   CVA, residual symptoms Seizures    Endocrine:   Diabetes, type 2, using insulin  Obesity / BMI > 30  Psych:  Psychiatric Normal           Physical Exam  General: Well nourished and  Alert    Airway:  Mallampati: III       Dental:  Intact    Chest/Lungs:  Clear to auscultation, Normal Respiratory Rate    Heart:  Rate: Normal  Rhythm: Regular Rhythm        Anesthesia Plan  Type of Anesthesia, risks & benefits discussed:    Anesthesia Type: Gen ETT  Intra-op Monitoring Plan: Standard ASA Monitors  Post Op Pain Control Plan: multimodal analgesia and IV/PO Opioids PRN  Induction:  IV  Airway Plan: Direct  Informed Consent: Informed consent signed with the Patient and all parties understand the risks and agree with anesthesia plan.  All questions answered. Patient consented to blood products? No  ASA Score: 3  Day of Surgery Review of History & Physical: H&P Update referred to the surgeon/provider.    Ready For Surgery From Anesthesia Perspective.     .

## 2022-11-19 VITALS
TEMPERATURE: 97 F | SYSTOLIC BLOOD PRESSURE: 150 MMHG | OXYGEN SATURATION: 97 % | RESPIRATION RATE: 19 BRPM | HEART RATE: 60 BPM | HEIGHT: 76 IN | WEIGHT: 259 LBS | BODY MASS INDEX: 31.54 KG/M2 | DIASTOLIC BLOOD PRESSURE: 71 MMHG

## 2022-11-19 LAB
ANION GAP SERPL CALC-SCNC: 9 MMOL/L (ref 8–16)
BASOPHILS # BLD AUTO: 0.03 K/UL (ref 0–0.2)
BASOPHILS NFR BLD: 0.6 % (ref 0–1.9)
BUN SERPL-MCNC: 15 MG/DL (ref 8–23)
CALCIUM SERPL-MCNC: 8.8 MG/DL (ref 8.7–10.5)
CHLORIDE SERPL-SCNC: 109 MMOL/L (ref 95–110)
CO2 SERPL-SCNC: 23 MMOL/L (ref 23–29)
CREAT SERPL-MCNC: 0.6 MG/DL (ref 0.5–1.4)
DIFFERENTIAL METHOD: ABNORMAL
EOSINOPHIL # BLD AUTO: 0.1 K/UL (ref 0–0.5)
EOSINOPHIL NFR BLD: 2.3 % (ref 0–8)
ERYTHROCYTE [DISTWIDTH] IN BLOOD BY AUTOMATED COUNT: 12.7 % (ref 11.5–14.5)
EST. GFR  (NO RACE VARIABLE): >60 ML/MIN/1.73 M^2
GLUCOSE SERPL-MCNC: 107 MG/DL (ref 70–110)
HCT VFR BLD AUTO: 35.7 % (ref 40–54)
HGB BLD-MCNC: 11.9 G/DL (ref 14–18)
IMM GRANULOCYTES # BLD AUTO: 0.01 K/UL (ref 0–0.04)
IMM GRANULOCYTES NFR BLD AUTO: 0.2 % (ref 0–0.5)
LYMPHOCYTES # BLD AUTO: 1.3 K/UL (ref 1–4.8)
LYMPHOCYTES NFR BLD: 25.7 % (ref 18–48)
MAGNESIUM SERPL-MCNC: 1.5 MG/DL (ref 1.6–2.6)
MCH RBC QN AUTO: 30.9 PG (ref 27–31)
MCHC RBC AUTO-ENTMCNC: 33.3 G/DL (ref 32–36)
MCV RBC AUTO: 93 FL (ref 82–98)
MONOCYTES # BLD AUTO: 0.4 K/UL (ref 0.3–1)
MONOCYTES NFR BLD: 8.4 % (ref 4–15)
NEUTROPHILS # BLD AUTO: 3.3 K/UL (ref 1.8–7.7)
NEUTROPHILS NFR BLD: 62.8 % (ref 38–73)
NRBC BLD-RTO: 0 /100 WBC
PHOSPHATE SERPL-MCNC: 3.5 MG/DL (ref 2.7–4.5)
PLATELET # BLD AUTO: 147 K/UL (ref 150–450)
PMV BLD AUTO: 10.9 FL (ref 9.2–12.9)
POCT GLUCOSE: 100 MG/DL (ref 70–110)
POCT GLUCOSE: 111 MG/DL (ref 70–110)
POTASSIUM SERPL-SCNC: 3.2 MMOL/L (ref 3.5–5.1)
RBC # BLD AUTO: 3.85 M/UL (ref 4.6–6.2)
SODIUM SERPL-SCNC: 141 MMOL/L (ref 136–145)
WBC # BLD AUTO: 5.21 K/UL (ref 3.9–12.7)

## 2022-11-19 PROCEDURE — 92610 EVALUATE SWALLOWING FUNCTION: CPT

## 2022-11-19 PROCEDURE — 84100 ASSAY OF PHOSPHORUS: CPT

## 2022-11-19 PROCEDURE — 94761 N-INVAS EAR/PLS OXIMETRY MLT: CPT

## 2022-11-19 PROCEDURE — 25000003 PHARM REV CODE 250

## 2022-11-19 PROCEDURE — 80048 BASIC METABOLIC PNL TOTAL CA: CPT

## 2022-11-19 PROCEDURE — 99233 SBSQ HOSP IP/OBS HIGH 50: CPT | Mod: ,,,

## 2022-11-19 PROCEDURE — 97110 THERAPEUTIC EXERCISES: CPT

## 2022-11-19 PROCEDURE — 97161 PT EVAL LOW COMPLEX 20 MIN: CPT

## 2022-11-19 PROCEDURE — 99233 PR SUBSEQUENT HOSPITAL CARE,LEVL III: ICD-10-PCS | Mod: ,,,

## 2022-11-19 PROCEDURE — 97530 THERAPEUTIC ACTIVITIES: CPT

## 2022-11-19 PROCEDURE — 97165 OT EVAL LOW COMPLEX 30 MIN: CPT

## 2022-11-19 PROCEDURE — 83735 ASSAY OF MAGNESIUM: CPT

## 2022-11-19 PROCEDURE — 85025 COMPLETE CBC W/AUTO DIFF WBC: CPT

## 2022-11-19 RX ADMIN — ATORVASTATIN CALCIUM 80 MG: 40 TABLET, FILM COATED ORAL at 08:11

## 2022-11-19 RX ADMIN — CLOPIDOGREL BISULFATE 75 MG: 75 TABLET ORAL at 08:11

## 2022-11-19 RX ADMIN — LEVETIRACETAM 750 MG: 750 TABLET, FILM COATED ORAL at 09:11

## 2022-11-19 RX ADMIN — LOSARTAN POTASSIUM 100 MG: 50 TABLET, FILM COATED ORAL at 08:11

## 2022-11-19 RX ADMIN — SENNOSIDES AND DOCUSATE SODIUM 1 TABLET: 50; 8.6 TABLET ORAL at 08:11

## 2022-11-19 RX ADMIN — POTASSIUM BICARBONATE 35 MEQ: 978 TABLET, EFFERVESCENT ORAL at 07:11

## 2022-11-19 RX ADMIN — PANTOPRAZOLE SODIUM 20 MG: 20 TABLET, DELAYED RELEASE ORAL at 08:11

## 2022-11-19 RX ADMIN — ASPIRIN 81 MG: 81 TABLET, CHEWABLE ORAL at 07:11

## 2022-11-19 RX ADMIN — MUPIROCIN: 20 OINTMENT TOPICAL at 09:11

## 2022-11-19 NOTE — PT/OT/SLP EVAL
Occupational Therapy   Co-Evaluation & Treatment    Name: Samuel Cast  MRN: [id number]  Admitting Diagnosis:  Carotid stenosis 1 Day Post-Op  Length of Stay: 1 days    Recommendations:     Discharge Recommendations: other (see comments)  Discharge Equipment Recommendations:  none  Barriers to discharge:  None    Plan:     Patient to be seen 3 x/week to address the above listed problems via self-care/home management, therapeutic activities, therapeutic exercises, neuromuscular re-education  Plan of Care Expires: 12/19/22  Plan of Care Reviewed with: patient, spouse      Assessment:     Samuel Cast is a 67 y.o. male with a medical diagnosis of Carotid stenosis.  He presents with the following performance deficits affecting function: weakness, impaired endurance, impaired self care skills, impaired functional mobility, gait instability, impaired balance, decreased upper extremity function, decreased lower extremity function, abnormal tone, impaired fine motor, decreased ROM, impaired joint extensibility, impaired muscle length.      Pt mainly limited by impaired cardiopulmonary response to ax (increased BP) on this date. Pt requiring Mod A for bed mobility, Sup for static sitting EOB. Pt with good motivation and good tolerance for therapy. Pt would benefit from discharging to the home environment with further OT services upon D/C to return to [facility].    Rehab Prognosis: Good; patient would benefit from acute skilled OT services to address these deficits and reach maximum level of function.         Subjective     Communicated with: RN prior to session. Patient found HOB elevated with pulse ox (continuous), telemetry, PureWick, blood pressure cuff and no family present upon OT entry to room (wife entered toward end of session).    Chief Complaint: No chief complaint on file.    Patient/Family Comments/goals: Pt's wife states she feels comfortable caregiving for pt at current level of functioning, denies further equipment  "needs    Pain/Comfort:  Pain Rating 1:  (pt reported pain to R foot-attributed to resting on footboard, did not quantify pain)  Pain Addressed 1: Reposition, Distraction, Cessation of Activity  Pain Rating Post-Intervention 1:  (no rating provided)    Patients cultural, spiritual, Yazdanism conflicts given the current situation: no    Occupational Profile:  Living Environment: Pt lives with his wife in an apartment with elevator access. Pt's bathroom has a tub/shower combo with a TTB in place as well as a raised toilet.  Prior Level of Function: Patient reports needing assist with LB ADLs (bathing, dressing toileting) and assist to propel W/C. He walks household distances with his sunitha-walker.  Patient uses DME as follows: W/C, TTB, hemiwalker, RLE AFO.   DME owned (not currently used): Quad cane.  Roles/Responsibilities:   Hand Dominance: Left (following CVA).  Work: No.   Drive: No.   Managing Medicines/Managing Home: No.     Patient reports they will have assistance from wife upon discharge.      Objective:     Patient found with: pulse ox (continuous), telemetry, PureWick, blood pressure cuff   General Precautions: Standard, fall   Orthopedic Precautions:N/A   Braces: AFO (R)   Oxygen Device: Room air  Vitals: BP (!) 165/96   Pulse (!) 57   Temp 97.2 °F (36.2 °C) (Oral)   Resp 17   Ht 6' 4" (1.93 m)   Wt 117.5 kg (259 lb)   SpO2 97%   BMI 31.53 kg/m²     Cognitive and Psychosocial Function:   AxOx Not formally tested, no signs of disorientation noted   Command-Following  Follows multi-step commands consistently   Communication Clear/fluent   Memory No deficits noted   Safety Awareness Intact   Affect Pleasant     Hearing: Appears WFL    Vision: No deficits noted    Physical Exam:  Postural examination/scapula alignment:    -       Rounded shoulders  -       Forward head   Left UE Right UE   UE Edema Absent Absent   UE ROM WFL Decreased PROM   UE Strength WFL 0/5    Strength WFL No composite grasp " present   Sensation No deficits noted Intact   Fine Motor Coordination Intact Impaired   Gross Motor Coordination Intact Impaired (hemiplegia)       Occupational Performance:  Bed Mobility:    Patient completed supine to sit with Mod A on left side of bed  Patient completed sit to supine with Mod A on left side of bed    Functional Mobility/Transfers:  Static Sitting EOB: Sup  BP = 168/99 at EOB, therefore further therapeutic ax deferred and pt returned to supine; RN aware    Activities of Daily Living:  Not performed 2/2 pt politely declined and session ended prematurely 2/2 increased BP      AMPAC 6 Click ADL:  AMPAC Total Score: 13    Treatment & Education:  Educated on role of OT, POC, and goals for this hospital stay  Emphasized importance of OOB ax  Encouraged pt to alert OT of personal self-care goals and/or comfort-related concerns during future OT sessions  Pt denies any further questions, concerns, or requests at this time        Patient left HOB elevated with all lines intact, call button in reach, wife present, and RN notified    GOALS:   Multidisciplinary Problems       Occupational Therapy Goals          Problem: Occupational Therapy    Goal Priority Disciplines Outcome Interventions   Occupational Therapy Goal     OT, PT/OT Ongoing, Progressing    Description: Goals to be met by: 12/2/2022     Patient will increase functional independence with ADLs by performing:    Feeding with Modified [place].  UE Dressing with [place].  LE Dressing with Moderate Assistance.  Grooming while seated with Modified [place].  Toileting from bedside commode with Moderate Assistance for hygiene and clothing management.   Supine to sit with Modified [place] from flat bed using AD PRN.  Step transfer with Modified [place] using sunitha-walker.  Toilet transfer to bedside commode with Modified [place] using sunitha-walker.                           History:     Past Medical History:   Diagnosis Date     Diabetes mellitus, type 2     Hypertension     Seizures     no seizure 2008    Stroke          Past Surgical History:   Procedure Laterality Date    COLONOSCOPY      EPIDURAL STEROID INJECTION INTO LUMBAR SPINE N/A 7/28/2021    Procedure: Injection-steroid-epidural-lumbar L5/S1;  Surgeon: Alfonso Childers MD;  Location: [facility] OR;  Service: Pain Management;  Laterality: N/A;    FIXATION KYPHOPLASTY N/A 6/30/2021    Procedure: Kyphoplasty L1, L2, L3;  Surgeon: Alfonso Childers MD;  Location: [facility] OR;  Service: Pain Management;  Laterality: N/A;    INSERTION OF IMPLANTABLE LOOP RECORDER N/A 8/24/2022    Procedure: Insertion, Implantable Loop Recorder;  Surgeon: Jeanmarie Andrade MD;  [facility];  Service: Cardiology;  Laterality: N/A;    KNEE SURGERY           Time Tracking:     OT Date of Treatment: 11/19/22  OT Start Time: 0752  OT Stop Time: 0811  OT Total Time (min): 19 min  Additional staff present: PT  Co-evaluation performed to safely facilitate mobility and functional tasks concurrently for comprehensive assessment.      Billable Minutes:  Evaluation 10  Therapeutic Activity 9      11/19/2022

## 2022-11-19 NOTE — SUBJECTIVE & OBJECTIVE
Past Medical History:   Diagnosis Date    Diabetes mellitus, type 2     Hypertension     Seizures     no seizure 2008    Stroke      Past Surgical History:   Procedure Laterality Date    COLONOSCOPY      EPIDURAL STEROID INJECTION INTO LUMBAR SPINE N/A 7/28/2021    Procedure: Injection-steroid-epidural-lumbar L5/S1;  Surgeon: Alfonso Childers MD;  Location: [facility] OR;  Service: Pain Management;  Laterality: N/A;    FIXATION KYPHOPLASTY N/A 6/30/2021    Procedure: Kyphoplasty L1, L2, L3;  Surgeon: Alfonso Childers MD;  Location: [facility] OR;  Service: Pain Management;  Laterality: N/A;    INSERTION OF IMPLANTABLE LOOP RECORDER N/A 8/24/2022    Procedure: Insertion, Implantable Loop Recorder;  Surgeon: Jeanmarie Andrade MD;  Location: [facility];  Service: Cardiology;  Laterality: N/A;    KNEE SURGERY        No current facility-administered medications on file prior to encounter.     Current Outpatient Medications on File Prior to Encounter   Medication Sig Dispense Refill    aspirin 81 MG Chew Take 1 tablet (81 mg total) by mouth once daily. (Patient taking differently: Take 81 mg by mouth every morning.) 30 tablet 2    atorvastatin (LIPITOR) 80 MG tablet Take 1 tablet (80 mg total) by mouth once daily. (Patient taking differently: Take 80 mg by mouth every morning.) 90 tablet 3    clopidogreL (PLAVIX) 75 mg tablet Take 1 tablet (75 mg total) by mouth once daily. Take plavix daily for only 21 days then stop per neurology recommendations (Patient taking differently: Take 75 mg by mouth every evening. Take plavix daily for only 21 days then stop per neurology recommendations) 90 tablet 0    insulin glargine, TOUJEO, (TOUJEO SOLOSTAR U-300 INSULIN) 300 unit/mL (1.5 mL) InPn pen Inject 32 Units into the skin every evening. 3 pen 6    levETIRAcetam (KEPPRA) 750 MG Tab Take 1 tablet (750 mg total) by mouth 2 (two) times daily. 180 tablet 3    losartan (COZAAR) 25 MG tablet Take 4 tablets (100 mg total) by mouth once daily. (Patient  "taking differently: Take 100 mg by mouth every morning.) 90 tablet 3    magnesium oxide (MAG-OX) 400 mg (241.3 mg magnesium) tablet Take 1 tablet (400 mg total) by mouth once daily. (Patient taking differently: Take 400 mg by mouth every morning.) 30 tablet 1    metFORMIN (GLUCOPHAGE-XR) 750 MG ER 24hr tablet Take 1 tablet (750 mg total) by mouth 2 (two) times daily with meals. 180 tablet 3    metoprolol tartrate (LOPRESSOR) 50 MG tablet Take 2 tablets (100 mg total) by mouth 2 (two) times a day. 360 tablet 3    pantoprazole (PROTONIX) 20 MG tablet Take 1 tablet (20 mg total) by mouth once daily. (Patient taking differently: Take 20 mg by mouth every morning.) 90 tablet 3    sertraline (ZOLOFT) 100 MG tablet Take 1 tablet (100 mg total) by mouth once daily. (Patient taking differently: Take 100 mg by mouth every morning.) 90 tablet 3    blood sugar diagnostic Strp To check BG 3-4 times daily, to use with insurance preferred meter. E11.65 200 strip 1    blood-glucose meter kit To check BG 3-4 times daily, to use with insurance preferred meter. E11.65 1 each 1    lancets Misc To check BG 3-4 times daily, to use with insurance preferred meter. E11.65 200 each 1    pen needle, diabetic 32 gauge x 5/32" Ndle Use with insulin pen as prescribed. E11.65 200 each 1    TRUEPLUS LANCETS 28 gauge Misc Apply topically.      [DISCONTINUED] amLODIPine (NORVASC) 10 MG tablet Take 1 tablet (10 mg total) by mouth once daily. 90 tablet 3    [DISCONTINUED] glimepiride (AMARYL) 4 MG tablet TAKE 1 TABLET (4 MG TOTAL) BY MOUTH BEFORE BREAKFAST. 90 tablet 2    [DISCONTINUED] hydrALAZINE (APRESOLINE) 25 MG tablet Take 1 tablet (25 mg total) by mouth every 8 (eight) hours. 270 tablet 3    [DISCONTINUED] omega 3-dha-epa-fish oil (FISH OIL) 1,000 mg (120 mg-180 mg) Cap Take 1 capsule by mouth every evening.      [DISCONTINUED] rosuvastatin (CRESTOR) 20 MG tablet Take 1 tablet (20 mg total) by mouth once daily. (Patient taking differently: Take " 20 mg by mouth every evening.) 90 tablet 3    [DISCONTINUED] traZODone (DESYREL) 50 MG tablet TAKE 1 TABLET EVERY EVENING 90 tablet 0      Allergies: Patient has no known allergies.    Family History   Problem Relation Age of Onset    Cancer Mother         pancreatic cancer    Cancer Father 70        colon cancer    Glaucoma Neg Hx      Social History     Tobacco Use    Smoking status: Never    Smokeless tobacco: Never   Substance Use Topics    Alcohol use: Not Currently     Comment: used to drink 16beer a day for 30 yrs.    Drug use: Not Currently     Review of Systems   Constitutional:  Negative for chills and fever.   HENT:  Negative for congestion and rhinorrhea.    Eyes:  Negative for pain and visual disturbance.   Respiratory:  Negative for chest tightness and shortness of breath.    Cardiovascular:  Negative for chest pain and palpitations.   Gastrointestinal:  Negative for abdominal distention, abdominal pain, constipation, diarrhea, nausea and vomiting.   Genitourinary:  Negative for difficulty urinating and dysuria.   Musculoskeletal:  Negative for back pain and neck pain.   Skin:  Negative for rash and wound.   Neurological:  Positive for headaches. Negative for dizziness.   Psychiatric/Behavioral:  Negative for confusion and sleep disturbance.    Objective:     Vitals:    Temp: 97.3 °F (36.3 °C)  Pulse: 60  Rhythm: normal sinus rhythm  BP: (!) 161/83  MAP (mmHg): 114  Resp: 13  SpO2: 99 %  O2 Device (Oxygen Therapy): room air    Temp  Min: 97.3 °F (36.3 °C)  Max: 97.6 °F (36.4 °C)  Pulse  Min: 59  Max: 69  BP  Min: 146/70  Max: 172/97  MAP (mmHg)  Min: 100  Max: 125  Resp  Min: 9  Max: 21  SpO2  Min: 98 %  Max: 100 %    No intake/output data recorded.           Physical Exam  Vitals and nursing note reviewed.   Constitutional:       General: He is not in acute distress.     Appearance: Normal appearance. He is not ill-appearing or diaphoretic.   HENT:      Head: Normocephalic and atraumatic.      Right  Ear: External ear normal.      Left Ear: External ear normal.      Nose: Nose normal. No congestion or rhinorrhea.      Mouth/Throat:      Mouth: Mucous membranes are moist.      Pharynx: Oropharynx is clear.      Comments: Poor dentition   Blood on upper front teeth  Eyes:      General: No scleral icterus.     Extraocular Movements: Extraocular movements intact.      Pupils: Pupils are equal, round, and reactive to light.   Pulmonary:      Effort: Pulmonary effort is normal.   Abdominal:      Palpations: Abdomen is soft.   Musculoskeletal:         General: Normal range of motion.      Cervical back: Normal range of motion and neck supple.      Right lower leg: No edema.      Left lower leg: No edema.   Skin:     General: Skin is warm and dry.   Neurological:      Mental Status: He is alert and oriented to person, place, and time.      Motor: Weakness present.      Coordination: Coordination abnormal.      Comments: RUE rigidity/plegia  RLE rigidity and able to move toes, knee is brace  Left side 5/5  Expressive aphasia, limited one or two word answers.   Psychiatric:         Mood and Affect: Mood normal.         Behavior: Behavior normal.         Thought Content: Thought content normal.         Judgment: Judgment normal.     Unable to test gait due to level of consciousness.    Today I personally reviewed pertinent medications, lines/drains/airways, imaging, cardiology results, laboratory results, microbiology results, notably:    IR angiogram carotid cerebral

## 2022-11-19 NOTE — PLAN OF CARE
Problem: Occupational Therapy  Goal: Occupational Therapy Goal  Description: Goals to be met by: 12/2/2022     Patient will increase functional independence with ADLs by performing:    Feeding with Modified [place].  UE Dressing with [place].  LE Dressing with Moderate Assistance.  Grooming while seated with Modified [place].  Toileting from bedside commode with Moderate Assistance for hygiene and clothing management.   Supine to sit with Modified [place] from flat bed using AD PRN.  Step transfer with Modified [place] using sunitha-walker.  Toilet transfer to bedside commode with Modified [place] using sunitha-walker.    Outcome: Ongoing, Progressing     Evaluation complete; goals and POC established. Recommending pt discharge to home with further OT services upon discharge to return to PLOF.    11/19/2022

## 2022-11-19 NOTE — DISCHARGE SUMMARY
Discharge Summary  Critical Care    Admit Date: 11/18/2022    Discharge Date: 11/19/22    LOS: 1    Principle Diagnosis: Carotid stenosis    Secondary Diagnoses:   Active Hospital Problems    Diagnosis  POA    *Carotid stenosis [I65.29]  Yes    Implantable loop recorder present [Z95.818]  Yes    Seizure disorder [G40.909]  Yes    Hemiparesis following cerebrovascular accident (CVA) [I69.359]  Not Applicable    Type 2 diabetes mellitus with hyperglycemia, with long-term current use of insulin [E11.65, Z79.4]  Not Applicable    HTN (hypertension) [I10]  Yes      Resolved Hospital Problems   No resolved problems to display.        HPI:  Samuel Cast is a 67 y.o. male with DM, HTN, large left MCA infarct in 2007 with residual R. Hemiparesis and expressive aphasia, seizures on keppra, hx of traumatic SAH 2022, and right ICA stenosis presents s/p R. ICA stenting. Baseline paralysis on the right and has an expressive aphasia. He sustained new stroke on the right hemisphere in 8/20222.  At that time he was not on any antiplatelet agents due to a prior brain bleed after a fall. He is admitted to [facility] for post-op monitoring.    Hospital/ICU Course:  NAEO. Stable for discharge to home 11/19/22    Physical Exam    Constitutional: Well-nourished, well-developed.   No obvious distress.   Patient lying in bed comfortably sitting upright with wife at bedside.     Eyes: Clear conjunctiva. Anicteric. No discharge.   Lids without lesions.    HEENT: Normocephalic, atraumatic.   Nose and external ears atraumatic.   Mucus membranes are moist.     Cardio:Bradycardic to 57 w/ regular rhythm on bedside monitor.   Pulses intact in bilateral upper and lower extremities.     Respiratory: Regular effort, no respiratory distress.    GI: Soft, non-distended, non-tender.    Skin: Groin site soft, mildly tender to palpation.      Neuro: E4 V5 M6    Awake, alert, and oriented to self, time, place, and situation. Patient is answering all questions  appropriately and following all commands briskly.   No facial droop. EOMI. PERRL.    Motor:   GALDAMEZ spontaneously and against gravity   RUE, RLE mild weakness compared to left side (residual from previous CVA and baseline per patient)     Sensory:  Sensation grossly intact      Significant Imaging:  CTA Head and Neck 8/22/22  Impression:     1. 70-80% stenosis of the right ICA near the origin in the neck with associated bulky predominantly noncalcified plaque.  2. 20-30% stenosis in the left ICA near the origin in the neck.  3. 3 cm right thyroid nodule.  Correlate with any prior thyroid ultrasound if none is available consider nonemergent outpatient thyroid ultrasound for further evaluation.    Significant Laboratory Data:  Recent Results (from the past 336 hour(s))   Basic metabolic panel    Collection Time: 11/19/22  2:00 AM   Result Value Ref Range    Sodium 141 136 - 145 mmol/L    Potassium 3.2 (L) 3.5 - 5.1 mmol/L    Chloride 109 95 - 110 mmol/L    CO2 23 23 - 29 mmol/L    BUN 15 8 - 23 mg/dL    Creatinine 0.6 0.5 - 1.4 mg/dL    Calcium 8.8 8.7 - 10.5 mg/dL    Anion Gap 9 8 - 16 mmol/L     Recent Results (from the past 336 hour(s))   CBC auto differential    Collection Time: 11/19/22  2:00 AM   Result Value Ref Range    WBC 5.21 3.90 - 12.70 K/uL    Hemoglobin 11.9 (L) 14.0 - 18.0 g/dL    Hematocrit 35.7 (L) 40.0 - 54.0 %    Platelets 147 (L) 150 - 450 K/uL   CBC Auto Differential    Collection Time: 11/14/22 10:25 AM   Result Value Ref Range    WBC 6.21 3.90 - 12.70 K/uL    Hemoglobin 14.4 14.0 - 18.0 g/dL    Hematocrit 42.5 40.0 - 54.0 %    Platelets 190 150 - 450 K/uL     Lab Results   Component Value Date    HGBA1C 5.2 10/14/2022     Microbiology Results (last 7 days)       ** No results found for the last 168 hours. **              Consultations:  IP CONSULT TO NEUROSURGERY  IP CONSULT TO PHYSICAL MEDICINE REHAB  IP CONSULT TO REGISTERED DIETITIAN/NUTRITIONIST  IP CONSULT TO SOCIAL WORK/CASE  MANAGEMENT  WOUND CARE CONSULT  WOUND CARE CONSULT  Interventional Radiology     Procedures:  Cerebral angiogram with angioplasty/stenting using apex monorail/carotid wall stent w/ spiderx distal protection embolic device.     Discharge Medications:     Medication List        CHANGE how you take these medications      aspirin 81 MG Chew  Take 1 tablet (81 mg total) by mouth once daily.  What changed: when to take this     atorvastatin 80 MG tablet  Commonly known as: LIPITOR  Take 1 tablet (80 mg total) by mouth once daily.  What changed: when to take this     clopidogreL 75 mg tablet  Commonly known as: PLAVIX  Take 1 tablet (75 mg total) by mouth once daily. Take plavix daily for only 21 days then stop per neurology recommendations  What changed: when to take this     losartan 25 MG tablet  Commonly known as: COZAAR  Take 4 tablets (100 mg total) by mouth once daily.  What changed: when to take this     magnesium oxide 400 mg (241.3 mg magnesium) tablet  Commonly known as: MAG-OX  Take 1 tablet (400 mg total) by mouth once daily.  What changed: when to take this     pantoprazole 20 MG tablet  Commonly known as: PROTONIX  Take 1 tablet (20 mg total) by mouth once daily.  What changed: when to take this     sertraline 100 MG tablet  Commonly known as: ZOLOFT  Take 1 tablet (100 mg total) by mouth once daily.  What changed: when to take this            CONTINUE taking these medications      blood sugar diagnostic Strp  To check BG 3-4 times daily, to use with insurance preferred meter. E11.65     blood-glucose meter kit  To check BG 3-4 times daily, to use with insurance preferred meter. E11.65     * lancets Misc  To check BG 3-4 times daily, to use with insurance preferred meter. E11.65     * TRUEPLUS LANCETS 28 gauge Misc  Generic drug: lancets     levETIRAcetam 750 MG Tab  Commonly known as: KEPPRA  Take 1 tablet (750 mg total) by mouth 2 (two) times daily.     metFORMIN 750 MG ER 24hr tablet  Commonly known as:  "GLUCOPHAGE-XR  Take 1 tablet (750 mg total) by mouth 2 (two) times daily with meals.     metoprolol tartrate 50 MG tablet  Commonly known as: LOPRESSOR  Take 2 tablets (100 mg total) by mouth 2 (two) times a day.     pen needle, diabetic 32 gauge x 5/32" Ndle  Use with insulin pen as prescribed. E11.65     TOUJEO SOLOSTAR U-300 INSULIN 300 unit/mL (1.5 mL) Inpn pen  Generic drug: insulin glargine (TOUJEO)  Inject 32 Units into the skin every evening.           * This list has 2 medication(s) that are the same as other medications prescribed for you. Read the directions carefully, and ask your doctor or other care provider to review them with you.                    Diet: Regular Diet     Level of Activity: As tolerated. Use home walker as baseline.     Follow up Plan:  1) Follow up with primary care physician in 1-2 weeks.   2) Follow up w/ Dr. Anderson w/ Interventional Radiology as listed.     Studies Pending: None    Discharge Disposition:  Patient was discharged to home in stable condition.    This discharge took more than 30 minutes to complete.     "

## 2022-11-19 NOTE — CONSULTS
Inpatient consult to Physical Medicine Rehab  Consult performed by: Carla Tong NP  Consult ordered by: Jose Aviles MD  Reason for consult: Assess rehab needs    Reviewed patient history and current admission.  Rehab team following.  Full consult to follow.    MARÍA Smith, FNP-C  Physical Medicine & Rehabilitation   11/19/2022

## 2022-11-19 NOTE — PLAN OF CARE
[facility] Care Plan    POC reviewed with Samuel Cast and family at 0500. Pt and family verbalized understanding. Questions and concerns addressed. No acute events overnight. Pt progressing toward goals. Will continue to monitor. See below and flowsheets for full assessment and VS info.     - PRN hydralazine given x 1  - Minimal controlled bleeding at R groin site; see note for more details.  - Wound care consulted for wounds present on admission.  - Huerta passed  - Electrolytes replaced      Is this a stroke patient? no    Neuro:  Chelle Coma Scale  Best Eye Response: 4-->(E4) spontaneous  Best Motor Response: 6-->(M6) obeys commands  Best Verbal Response: 5-->(V5) oriented  [place] Coma Scale Score: 15  Assessment Qualifiers: patient not sedated/intubated  Pupil PERRLA: yes     24hr Temp:  [97.2 °F (36.2 °C)-97.7 °F (36.5 °C)]     CV:   Rhythm: normal sinus rhythm  BP goals:   SBP < 160  MAP > 65    Resp:   O2 Device (Oxygen Therapy): room air       Plan: N/A    GI/:     Diet/Nutrition Received: regular  Last Bowel Movement: 11/17/22  Voiding Characteristics: external catheter    Intake/Output Summary (Last 24 hours) at 11/19/2022 0454  Last data filed at 11/19/2022 0405  Gross per 24 hour   Intake 1400 ml   Output 945 ml   Net 455 ml     Unmeasured Output  Stool Occurrence: 0    Labs/Accuchecks:  Recent Labs   Lab 11/14/22  1025   WBC 6.21   RBC 4.59*   HGB 14.4   HCT 42.5         Recent Labs   Lab 11/14/22  1025 11/19/22  0200    141   K 4.2 3.2*   CO2 22* 23    109   BUN 13 15   CREATININE 0.7 0.6   ALKPHOS 73  --    ALT 23  --    AST 20  --    BILITOT 0.7  --       Recent Labs   Lab 11/14/22  1025   INR 1.1    No results for input(s): CPK, CPKMB, TROPONINI, MB in the last 168 hours.    Electrolytes: Electrolytes replaced  Accuchecks: ACHS    Gtts:      LDA/Wounds:  Lines/Drains/Airways       Drain  Duration             Male External Urinary Catheter 11/18/22 1905 Small <1 day               Peripheral Intravenous Line  Duration                  Peripheral IV - Single Lumen 18 G Left Antecubital -- days         Peripheral IV - Single Lumen 11/18/22 1210 20 G Left;Anterior Forearm <1 day                  Wounds: Yes  Wound care consulted: Yes

## 2022-11-19 NOTE — NURSING
Small amount of fresh bloody drainage noticed on procedure site dressing. Notified JOSE EDUARDO Lee. JOSE EDUARDO Lee at bedside; no swelling, hematoma, or active bleeding noted. Pressure applied to site with gauze for 5 minutes and new guaze and pressure dressing applied per orders from JOSE EDUARDO Lee. No complications noted, WCTM.

## 2022-11-19 NOTE — H&P
[facility] - Neuro Critical Care  Neurocritical Care  History & Physical    Admit Date: (Not on file)  Service Date: 11/18/2022  Length of Stay: 0    Subjective:     Chief Complaint: Carotid stenosis    History of Present Illness: Samuel Cast is a 67 y.o. male with DM, HTN, large left MCA infarct in 2007 with residual R. Hemiparesis and expressive aphasia, seizures on keppra, hx of traumatic SAH 2022, and right ICA stenosis presents s/p R. ICA stenting. Baseline paralysis on the right and has an expressive aphasia. He sustained new stroke on the right hemisphere in 8/20222.  At that time he was not on any antiplatelet agents due to a prior brain bleed after a fall. He is admitted to [facility] for post-op monitoring.      Past Medical History:   Diagnosis Date    Diabetes mellitus, type 2     Hypertension     Seizures     no seizure 2008    Stroke      Past Surgical History:   Procedure Laterality Date    COLONOSCOPY      EPIDURAL STEROID INJECTION INTO LUMBAR SPINE N/A 7/28/2021    Procedure: Injection-steroid-epidural-lumbar L5/S1;  Surgeon: Alfonso Childers MD;  Location: [facility] OR;  Service: Pain Management;  Laterality: N/A;    FIXATION KYPHOPLASTY N/A 6/30/2021    Procedure: Kyphoplasty L1, L2, L3;  Surgeon: Alfonso Childers MD;  Location: [facility] OR;  Service: Pain Management;  Laterality: N/A;    INSERTION OF IMPLANTABLE LOOP RECORDER N/A 8/24/2022    Procedure: Insertion, Implantable Loop Recorder;  Surgeon: Jeanmarie Andrade MD;  Location: [facility] CATH;  Service: Cardiology;  Laterality: N/A;    KNEE SURGERY        No current facility-administered medications on file prior to encounter.     Current Outpatient Medications on File Prior to Encounter   Medication Sig Dispense Refill    aspirin 81 MG Chew Take 1 tablet (81 mg total) by mouth once daily. (Patient taking differently: Take 81 mg by mouth every morning.) 30 tablet 2    atorvastatin (LIPITOR) 80 MG tablet Take 1 tablet (80 mg total) by mouth once daily.  (Patient taking differently: Take 80 mg by mouth every morning.) 90 tablet 3    clopidogreL (PLAVIX) 75 mg tablet Take 1 tablet (75 mg total) by mouth once daily. Take plavix daily for only 21 days then stop per neurology recommendations (Patient taking differently: Take 75 mg by mouth every evening. Take plavix daily for only 21 days then stop per neurology recommendations) 90 tablet 0    insulin glargine, TOUJEO, (TOUJEO SOLOSTAR U-300 INSULIN) 300 unit/mL (1.5 mL) InPn pen Inject 32 Units into the skin every evening. 3 pen 6    levETIRAcetam (KEPPRA) 750 MG Tab Take 1 tablet (750 mg total) by mouth 2 (two) times daily. 180 tablet 3    losartan (COZAAR) 25 MG tablet Take 4 tablets (100 mg total) by mouth once daily. (Patient taking differently: Take 100 mg by mouth every morning.) 90 tablet 3    magnesium oxide (MAG-OX) 400 mg (241.3 mg magnesium) tablet Take 1 tablet (400 mg total) by mouth once daily. (Patient taking differently: Take 400 mg by mouth every morning.) 30 tablet 1    metFORMIN (GLUCOPHAGE-XR) 750 MG ER 24hr tablet Take 1 tablet (750 mg total) by mouth 2 (two) times daily with meals. 180 tablet 3    metoprolol tartrate (LOPRESSOR) 50 MG tablet Take 2 tablets (100 mg total) by mouth 2 (two) times a day. 360 tablet 3    pantoprazole (PROTONIX) 20 MG tablet Take 1 tablet (20 mg total) by mouth once daily. (Patient taking differently: Take 20 mg by mouth every morning.) 90 tablet 3    sertraline (ZOLOFT) 100 MG tablet Take 1 tablet (100 mg total) by mouth once daily. (Patient taking differently: Take 100 mg by mouth every morning.) 90 tablet 3    blood sugar diagnostic Strp To check BG 3-4 times daily, to use with insurance preferred meter. E11.65 200 strip 1    blood-glucose meter kit To check BG 3-4 times daily, to use with insurance preferred meter. E11.65 1 each 1    lancets Misc To check BG 3-4 times daily, to use with insurance preferred meter. E11.65 200 each 1    pen needle,  "diabetic 32 gauge x 5/32" Ndle Use with insulin pen as prescribed. E11.65 200 each 1    TRUEPLUS LANCETS 28 gauge Misc Apply topically.      [DISCONTINUED] amLODIPine (NORVASC) 10 MG tablet Take 1 tablet (10 mg total) by mouth once daily. 90 tablet 3    [DISCONTINUED] glimepiride (AMARYL) 4 MG tablet TAKE 1 TABLET (4 MG TOTAL) BY MOUTH BEFORE BREAKFAST. 90 tablet 2    [DISCONTINUED] hydrALAZINE (APRESOLINE) 25 MG tablet Take 1 tablet (25 mg total) by mouth every 8 (eight) hours. 270 tablet 3    [DISCONTINUED] omega 3-dha-epa-fish oil (FISH OIL) 1,000 mg (120 mg-180 mg) Cap Take 1 capsule by mouth every evening.      [DISCONTINUED] rosuvastatin (CRESTOR) 20 MG tablet Take 1 tablet (20 mg total) by mouth once daily. (Patient taking differently: Take 20 mg by mouth every evening.) 90 tablet 3    [DISCONTINUED] traZODone (DESYREL) 50 MG tablet TAKE 1 TABLET EVERY EVENING 90 tablet 0      Allergies: Patient has no known allergies.    Family History   Problem Relation Age of Onset    Cancer Mother         pancreatic cancer    Cancer Father 70        colon cancer    Glaucoma Neg Hx      Social History     Tobacco Use    Smoking status: Never    Smokeless tobacco: Never   Substance Use Topics    Alcohol use: Not Currently     Comment: used to drink 16beer a day for 30 yrs.    Drug use: Not Currently     Review of Systems   Constitutional:  Negative for chills and fever.   HENT:  Negative for congestion and rhinorrhea.    Eyes:  Negative for pain and visual disturbance.   Respiratory:  Negative for chest tightness and shortness of breath.    Cardiovascular:  Negative for chest pain and palpitations.   Gastrointestinal:  Negative for abdominal distention, abdominal pain, constipation, diarrhea, nausea and vomiting.   Genitourinary:  Negative for difficulty urinating and dysuria.   Musculoskeletal:  Negative for back pain and neck pain.   Skin:  Negative for rash and wound.   Neurological:  Positive for " headaches. Negative for dizziness.   Psychiatric/Behavioral:  Negative for confusion and sleep disturbance.    Objective:     Vitals:    Temp: 97.3 °F (36.3 °C)  Pulse: 60  Rhythm: normal sinus rhythm  BP: (!) 161/83  MAP (mmHg): 114  Resp: 13  SpO2: 99 %  O2 Device (Oxygen Therapy): room air    Temp  Min: 97.3 °F (36.3 °C)  Max: 97.6 °F (36.4 °C)  Pulse  Min: 59  Max: 69  BP  Min: 146/70  Max: 172/97  MAP (mmHg)  Min: 100  Max: 125  Resp  Min: 9  Max: 21  SpO2  Min: 98 %  Max: 100 %    No intake/output data recorded.           Physical Exam  Vitals and nursing note reviewed.   Constitutional:       General: He is not in acute distress.     Appearance: Normal appearance. He is not ill-appearing or diaphoretic.   HENT:      Head: Normocephalic and atraumatic.      Right Ear: External ear normal.      Left Ear: External ear normal.      Nose: Nose normal. No congestion or rhinorrhea.      Mouth/Throat:      Mouth: Mucous membranes are moist.      Pharynx: Oropharynx is clear.      Comments: Poor dentition   Blood on upper front teeth  Eyes:      General: No scleral icterus.     Extraocular Movements: Extraocular movements intact.      Pupils: Pupils are equal, round, and reactive to light.   Pulmonary:      Effort: Pulmonary effort is normal.   Abdominal:      Palpations: Abdomen is soft.   Musculoskeletal:         General: Normal range of motion.      Cervical back: Normal range of motion and neck supple.      Right lower leg: No edema.      Left lower leg: No edema.   Skin:     General: Skin is warm and dry.   Neurological:      Mental Status: He is alert and oriented to person, place, and time.      Motor: Weakness present.      Coordination: Coordination abnormal.      Comments: RUE rigidity/plegia  RLE rigidity and able to move toes, knee is brace  Left side 5/5  Expressive aphasia, limited one or two word answers.   Psychiatric:         Mood and Affect: Mood normal.         Behavior: Behavior normal.          Thought Content: Thought content normal.         Judgment: Judgment normal.     Unable to test gait due to level of consciousness.    Today I personally reviewed pertinent medications, lines/drains/airways, imaging, cardiology results, laboratory results, microbiology results, notably:    IR angiogram carotid cerebral      Assessment/Plan:     Neuro  Seizure disorder  -cont home keppra 750mg bid    Hemiparesis following cerebrovascular accident (CVA)  Baseline from 2007 infarct    Cardiac/Vascular  * Carotid stenosis  R. Carotid 70-80% stenosis prior to today.  IR angiogram + R. Carotid stent and balloon angioplasty    --Q1hr neuro checks  --Q1hr VS  --Cont DAPT  --Will hold AC for 24 hours post procedure  --SBP> 90; SBP<160  --To lay flat for 2 hours post procedure        HTN (hypertension)  Continue Home losartan     Endocrine  Type 2 diabetes mellitus with hyperglycemia, with long-term current use of insulin  -hold home meds  -MDSSI + accuchekc 4xdaily  -Diabetic diet          The patient is being Prophylaxed for:  Venous Thromboembolism with: Mechanical  Stress Ulcer with: PPI  Ventilator Pneumonia with: not applicable    Activity Orders          Progressive Mobility Protocol (mobilize patient to their highest level of functioning at least twice daily) starting at 11/18 2000    Turn patient starting at 11/18 1800    Elevate HOB starting at 11/18 1737    Diet diabetic [facility]; 2000 Calorie: Diabetic starting at 11/18 1735        Full Code    Jose Aviles MD  Neurocritical Care  [facility] - Neuro Critical Care

## 2022-11-19 NOTE — ANESTHESIA POSTPROCEDURE EVALUATION
Anesthesia Post Evaluation    Patient: Samuel Cast    Procedure(s) Performed: * No procedures listed *    Final Anesthesia Type: general      Patient location during evaluation: PACU  Patient participation: Yes- Able to Participate  Level of consciousness: awake and alert and oriented  Post-procedure vital signs: reviewed and stable  Pain management: adequate  Airway patency: patent    PONV status at discharge: No PONV  Anesthetic complications: no      Cardiovascular status: blood pressure returned to baseline and hemodynamically stable  Respiratory status: unassisted, spontaneous ventilation and room air  Hydration status: euvolemic  Follow-up not needed.          Vitals Value Taken Time   /83 11/18/22 1946   Temp 36.3 °C (97.3 °F) 11/18/22 1645   Pulse 60 11/18/22 1947   Resp 19 11/18/22 1947   SpO2 100 % 11/18/22 1947   Vitals shown include unvalidated device data.      Event Time   Out of Recovery 19:05:00         Pain/Marcello Score: Marcello Score: 9 (11/18/2022  5:00 PM)

## 2022-11-19 NOTE — PT/OT/SLP EVAL
Speech Language Pathology Evaluation  Bedside Swallow    Patient Name:  Samuel Cast   MRN:  [id number]  Admitting Diagnosis: Carotid stenosis    Recommendations:                 General Recommendations:  Follow-up not indicated  Diet recommendations:  Regular, Thin   Aspiration Precautions: Assistance with meals and Standard aspiration precautions   General Precautions: Standard, aphasia, fall  Communication strategies:  provide increased time to answer and go to room if call light pushed    History:     Past Medical History:   Diagnosis Date    Diabetes mellitus, type 2     Hypertension     Seizures     no seizure 2008    Stroke        Past Surgical History:   Procedure Laterality Date    COLONOSCOPY      EPIDURAL STEROID INJECTION INTO LUMBAR SPINE N/A 7/28/2021    Procedure: Injection-steroid-epidural-lumbar L5/S1;  Surgeon: Alfonso Childers MD;  Location: [facility] OR;  Service: Pain Management;  Laterality: N/A;    FIXATION KYPHOPLASTY N/A 6/30/2021    Procedure: Kyphoplasty L1, L2, L3;  Surgeon: Alfonso Childers MD;  Location: [facility] OR;  Service: Pain Management;  Laterality: N/A;    INSERTION OF IMPLANTABLE LOOP RECORDER N/A 8/24/2022    Procedure: Insertion, Implantable Loop Recorder;  Surgeon: Jeanmarie Andrade MD;  [facility];  Service: Cardiology;  Laterality: N/A;    KNEE SURGERY         Social History: Patient lives with his spouse in [place].    Prior Intubation HX:  None this admission    Modified Barium Swallow: None on file    Chest X-Rays: None this admission    Prior diet: reg/thin    Subjective     Patient awake and alert  Communicated with nurse prior to session    Pain/Comfort:  Pain Rating 1: 0/10  Pain Rating Post-Intervention 1: 0/10    Respiratory Status: Room air    Objective:     Oral Musculature Evaluation  Oral Musculature: WFL  Dentition: present and adequate  Secretion Management: adequate  Mucosal Quality: good  Mandibular Strength and Mobility: WFL  Oral Labial Strength and  Mobility: WFL  Lingual Strength and Mobility: WFL  Volitional Cough: adequate  Volitional Swallow: timely; good laryngeal elevation  Voice Prior to PO Intake: WFL    Bedside Swallow Eval:   Consistencies Assessed:  Thin liquids x3  Solids x2      Oral Phase:   WFL    Pharyngeal Phase:   no overt clinical signs/symptoms of aspiration  no overt clinical signs/symptoms of pharyngeal dysphagia    Compensatory Strategies  None    Treatment: Patient sitting up in bed eating lunch with spouse present. Patient with baseline communication deficit following CVA in 2007 and he is currently at ST baseline. SLP educated patient and spouse regarding role of ST and they verbalized understanding. Patient left in bed with spouse present.     Assessment:     Samuel Cast is a 67 y.o. male who is baseline from a ST perspective. He demonstrates chronic communication deficit.     Goals:   Multidisciplinary Problems       SLP Goals       Not on file              Multidisciplinary Problems (Resolved)          Problem: SLP    Goal Priority Disciplines Outcome   SLP Goal   (Resolved)     SLP Met                       Plan:     Plan of Care reviewed with:  patient, spouse   SLP Follow-Up:  No       Discharge recommendations:   (no further ST recommended)   Barriers to Discharge:  None    Time Tracking:     SLP Treatment Date:   11/19/22  Speech Start Time:  1321  Speech Stop Time:  1331     Speech Total Time (min):  10 min    Billable Minutes: Eval Swallow and Oral Function 10    Brandon Ospina CCC-SLP  Speech-Language Pathology  Pager: [phone]   11/19/2022

## 2022-11-19 NOTE — PROGRESS NOTES
Patient being discharged to HOME per orders.     Discharge instructions and AVS reviewed with patient. Patient and wife verbalized understanding. All questions addressed. LDAs removed. Assisted dressing pt in his clothes and shoes and charge nurse transported him via wheelchair.     Patient transported via wheelchair to ride home with belongings by RN. Wife left to bring car to 2nd floor of garage to meet pt there. Pt was intact and doing well.

## 2022-11-19 NOTE — PLAN OF CARE
Problem: Physical Therapy  Goal: Physical Therapy Goal  Description: Goals to be met by: 12/3/2022     Patient will increase functional independence with mobility by performin. Supine to sit with Stand-by Assistance  2. Sit to stand transfer with Minimal Assistance  3. Bed to chair transfer with Minimal Assistance using Caden-walker  4. Gait  x 10 feet with Minimal Assistance using Caden-walker.     Outcome: Ongoing, Progressing     Pt evaluated and appropriate goals established.

## 2022-11-19 NOTE — HPI
Samuel Cast is a 67 y.o. male with DM, HTN, large left MCA infarct in 2007 with residual R. Hemiparesis and expressive aphasia, seizures on keppra, hx of traumatic SAH 2022, and right ICA stenosis presents s/p R. ICA stenting. Baseline paralysis on the right and has an expressive aphasia. He sustained new stroke on the right hemisphere in 8/20222.  At that time he was not on any antiplatelet agents due to a prior brain bleed after a fall. He is admitted to [facility] for post-op monitoring.

## 2022-11-19 NOTE — NURSING
Patient arrived to [facility] from IR >> PACU >> [facility]    Report received from: PACU RN    Type of stroke/diagnosis: Carotid stenosis; post op stent placement    TPA start and end time: n/a    Thrombectomy start and end time: n/a     Current symptoms: minimal withdraw right side; spontaneous on left; A & O x3; right facial droop; sensation present in all extremities    Skin assessment done: Yes; right groin site (post op)    Wounds noted: Healing stage 2 left buttocks; skin tear on right buttocks; left knee abrasion; skin irritation to medial ankles    *If wounds noted, was Wound Care consulted? Y/N    Bradley Completed? Yes; passed    Patient Belongings on Admit: barbara jacket, cell phone,     NCC notified: Dylan Chicas NP

## 2022-11-19 NOTE — ASSESSMENT & PLAN NOTE
R. Carotid 70-80% stenosis prior to today.  IR angiogram + R. Carotid stent and balloon angioplasty    --Q1hr neuro checks  --Q1hr VS  --Cont DAPT  --Will hold AC for 24 hours post procedure  --SBP> 90; SBP<160  --To lay flat for 2 hours post procedure

## 2022-11-21 ENCOUNTER — PATIENT OUTREACH (OUTPATIENT)
Dept: ADMINISTRATIVE | Facility: CLINIC | Age: 67
End: 2022-11-21
Payer: MEDICARE

## 2022-11-21 LAB
POC ACTIVATED CLOTTING TIME K: 231 SEC (ref 74–137)
POC ACTIVATED CLOTTING TIME K: 242 SEC (ref 74–137)
SAMPLE: ABNORMAL
SAMPLE: ABNORMAL

## 2022-11-22 DIAGNOSIS — I65.21 STENOSIS OF RIGHT CAROTID ARTERY: Primary | ICD-10-CM

## 2022-11-30 ENCOUNTER — OFFICE VISIT (OUTPATIENT)
Dept: PODIATRY | Facility: CLINIC | Age: 67
End: 2022-11-30
Payer: MEDICARE

## 2022-11-30 DIAGNOSIS — E11.51 TYPE II DIABETES MELLITUS WITH PERIPHERAL CIRCULATORY DISORDER: ICD-10-CM

## 2022-11-30 DIAGNOSIS — M21.371 ACQUIRED RIGHT FOOT DROP: ICD-10-CM

## 2022-11-30 DIAGNOSIS — E11.9 TYPE 2 DIABETES MELLITUS WITHOUT COMPLICATION, WITHOUT LONG-TERM CURRENT USE OF INSULIN: Primary | ICD-10-CM

## 2022-11-30 DIAGNOSIS — B35.1 ONYCHOMYCOSIS DUE TO DERMATOPHYTE: ICD-10-CM

## 2022-11-30 DIAGNOSIS — I69.359 HEMIPARESIS FOLLOWING CEREBROVASCULAR ACCIDENT (CVA): ICD-10-CM

## 2022-11-30 PROCEDURE — 1160F RVW MEDS BY RX/DR IN RCRD: CPT | Mod: CPTII,S$GLB,, | Performed by: PODIATRIST

## 2022-11-30 PROCEDURE — 3061F NEG MICROALBUMINURIA REV: CPT | Mod: CPTII,S$GLB,, | Performed by: PODIATRIST

## 2022-11-30 PROCEDURE — 3044F PR MOST RECENT HEMOGLOBIN A1C LEVEL <7.0%: ICD-10-PCS | Mod: CPTII,S$GLB,, | Performed by: PODIATRIST

## 2022-11-30 PROCEDURE — 1159F MED LIST DOCD IN RCRD: CPT | Mod: CPTII,S$GLB,, | Performed by: PODIATRIST

## 2022-11-30 PROCEDURE — 99499 NO LOS: ICD-10-PCS | Mod: S$GLB,,, | Performed by: PODIATRIST

## 2022-11-30 PROCEDURE — 1159F PR MEDICATION LIST DOCUMENTED IN MEDICAL RECORD: ICD-10-PCS | Mod: CPTII,S$GLB,, | Performed by: PODIATRIST

## 2022-11-30 PROCEDURE — 3288F PR FALLS RISK ASSESSMENT DOCUMENTED: ICD-10-PCS | Mod: CPTII,S$GLB,, | Performed by: PODIATRIST

## 2022-11-30 PROCEDURE — 3061F PR NEG MICROALBUMINURIA RESULT DOCUMENTED/REVIEW: ICD-10-PCS | Mod: CPTII,S$GLB,, | Performed by: PODIATRIST

## 2022-11-30 PROCEDURE — 1101F PR PT FALLS ASSESS DOC 0-1 FALLS W/OUT INJ PAST YR: ICD-10-PCS | Mod: CPTII,S$GLB,, | Performed by: PODIATRIST

## 2022-11-30 PROCEDURE — 3066F NEPHROPATHY DOC TX: CPT | Mod: CPTII,S$GLB,, | Performed by: PODIATRIST

## 2022-11-30 PROCEDURE — 1101F PT FALLS ASSESS-DOCD LE1/YR: CPT | Mod: CPTII,S$GLB,, | Performed by: PODIATRIST

## 2022-11-30 PROCEDURE — 99499 UNLISTED E&M SERVICE: CPT | Mod: S$GLB,,, | Performed by: PODIATRIST

## 2022-11-30 PROCEDURE — 11721 PR DEBRIDEMENT OF NAILS, 6 OR MORE: ICD-10-PCS | Mod: Q9,S$GLB,, | Performed by: PODIATRIST

## 2022-11-30 PROCEDURE — 1160F PR REVIEW ALL MEDS BY PRESCRIBER/CLIN PHARMACIST DOCUMENTED: ICD-10-PCS | Mod: CPTII,S$GLB,, | Performed by: PODIATRIST

## 2022-11-30 PROCEDURE — 3044F HG A1C LEVEL LT 7.0%: CPT | Mod: CPTII,S$GLB,, | Performed by: PODIATRIST

## 2022-11-30 PROCEDURE — 3066F PR DOCUMENTATION OF TREATMENT FOR NEPHROPATHY: ICD-10-PCS | Mod: CPTII,S$GLB,, | Performed by: PODIATRIST

## 2022-11-30 PROCEDURE — 3288F FALL RISK ASSESSMENT DOCD: CPT | Mod: CPTII,S$GLB,, | Performed by: PODIATRIST

## 2022-11-30 PROCEDURE — 99999 PR PBB SHADOW E&M-EST. PATIENT-LVL III: CPT | Mod: PBBFAC,,, | Performed by: PODIATRIST

## 2022-11-30 PROCEDURE — 11721 DEBRIDE NAIL 6 OR MORE: CPT | Mod: Q9,S$GLB,, | Performed by: PODIATRIST

## 2022-11-30 PROCEDURE — 3072F PR LOW RISK FOR RETINOPATHY: ICD-10-PCS | Mod: CPTII,S$GLB,, | Performed by: PODIATRIST

## 2022-11-30 PROCEDURE — 99999 PR PBB SHADOW E&M-EST. PATIENT-LVL III: ICD-10-PCS | Mod: PBBFAC,,, | Performed by: PODIATRIST

## 2022-11-30 PROCEDURE — 4010F ACE/ARB THERAPY RXD/TAKEN: CPT | Mod: CPTII,S$GLB,, | Performed by: PODIATRIST

## 2022-11-30 PROCEDURE — 4010F PR ACE/ARB THEARPY RXD/TAKEN: ICD-10-PCS | Mod: CPTII,S$GLB,, | Performed by: PODIATRIST

## 2022-11-30 PROCEDURE — 3072F LOW RISK FOR RETINOPATHY: CPT | Mod: CPTII,S$GLB,, | Performed by: PODIATRIST

## 2022-11-30 NOTE — PROGRESS NOTES
Subjective:      Patient ID: Samuel Cast is a 67 y.o. male.    Chief Complaint: Diabetes Mellitus and Nail Care    Samuel is a 67 y.o. male who presents to the clinic upon referral from Dr. Evelia yang. provider found  for evaluation and treatment of diabetic feet. Samuel has a past medical history of Diabetes mellitus, type 2, Hypertension, Seizures, and Stroke. Patient relates no major problem with feet. Only complaints today consist of concern over long thick nails difficult to cut, high risk care with DM II and associated complications    PCP: Nallely Jurado MD    Date Last Seen by PCP: 110/27/21    Current shoe gear: Casual shoes    Hemoglobin A1C   Date Value Ref Range Status   10/14/2022 5.2 4.0 - 5.6 % Final     Comment:     ADA Screening Guidelines:  5.7-6.4%  Consistent with prediabetes  >or=6.5%  Consistent with diabetes    High levels of fetal hemoglobin interfere with the HbA1C  assay. Heterozygous hemoglobin variants (HbS, HgC, etc)do  not significantly interfere with this assay.   However, presence of multiple variants may affect accuracy.     08/10/2022 8.6 (H) 4.0 - 5.6 % Final     Comment:     ADA Screening Guidelines:  5.7-6.4%  Consistent with prediabetes  >or=6.5%  Consistent with diabetes    High levels of fetal hemoglobin interfere with the HbA1C  assay. Heterozygous hemoglobin variants (HbS, HgC, etc)do  not significantly interfere with this assay.   However, presence of multiple variants may affect accuracy.     04/27/2022 5.4 0.0 - 5.6 % Final     Comment:     Reference Interval:  5.0 - 5.6 Normal   5.7 - 6.4 High Risk   > 6.5 Diabetic      Hgb A1c results are standardized based on the (NGSP) National   Glycohemoglobin Standardization Program.      Hemoglobin A1C levels are related to mean serum/plasma glucose   during the preceding 2-3 months.                Review of Systems   Constitutional: Negative for chills and fever.   Cardiovascular:  Positive for leg swelling. Negative for  claudication.   Respiratory:  Negative for shortness of breath.    Skin:  Positive for nail changes. Negative for itching and rash.   Musculoskeletal:  Negative for muscle cramps, muscle weakness and myalgias.   Gastrointestinal:  Negative for nausea and vomiting.   Neurological:  Negative for focal weakness, loss of balance, numbness and paresthesias.         Objective:      Physical Exam  Constitutional:       General: He is not in acute distress.     Appearance: He is well-developed. He is not diaphoretic.   Cardiovascular:      Pulses:           Dorsalis pedis pulses are 2+ on the right side and 2+ on the left side.        Posterior tibial pulses are 2+ on the right side and 2+ on the left side.      Comments: < 3 sec capillary refill time to toes 1-5 bilateral. Toes and feet are warm to touch proximally with normal distal cooling b/l. There is no hair growth on the feet and toes b/l. There is edema b/l. No spider veins or varicosities present b/l.     Musculoskeletal:      Comments: Equinus noted b/l ankles with < 10 deg DF noted. Right sided muscle weakness to PF, DF, eversion and inversion 3-4/5. Passive range of motion of ankle and pedal joints is painless b/l.     Skin:     General: Skin is warm and dry.      Coloration: Skin is not pale.      Findings: No abrasion, bruising, burn, ecchymosis, erythema, laceration, lesion, petechiae or rash.      Nails: There is no clubbing.      Comments: Skin is thin and atrophic bilateral    Nails 1-5 bilateral are thick 3-4 mm, long 5-15 mm, and discolored with subungual debris       Neurological:      Mental Status: He is alert and oriented to person, place, and time.      Sensory: No sensory deficit.      Motor: No tremor, atrophy or abnormal muscle tone.      Comments: Negative tinel sign bilateral.   Psychiatric:         Behavior: Behavior normal.             Assessment:       Encounter Diagnoses   Name Primary?    Type 2 diabetes mellitus without complication,  without long-term current use of insulin Yes    Type II diabetes mellitus with peripheral circulatory disorder     Hemiparesis following cerebrovascular accident (CVA)     Acquired right foot drop            Plan:       Samuel was seen today for diabetes mellitus and nail care.    Diagnoses and all orders for this visit:    Type 2 diabetes mellitus without complication, without long-term current use of insulin    Type II diabetes mellitus with peripheral circulatory disorder    Hemiparesis following cerebrovascular accident (CVA)  -     ANKLE FOOT ORTHOSIS FOR HOME USE    Acquired right foot drop  -     ANKLE FOOT ORTHOSIS FOR HOME USE    I counseled the patient on his conditions, their implications and medical management.    Shoe inspection. Diabetic Foot Education. Patient reminded of the importance of good nutrition and blood sugar control to help prevent podiatric complications of diabetes. Patient instructed on proper foot hygeine. We discussed wearing proper shoe gear, daily foot inspections, never walking without protective shoe gear, never putting sharp instruments to feet     - With patient's permission, nails were aggressively reduced and debrided x 10 to their soft tissue attachment mechanically and with electric , removing all offending nail and debris. Patient relates relief following the procedure. She will continue to monitor the areas daily, inspect her feet, wear protective shoe gear when ambulatory, moisturizer to maintain skin integrity.    - Return to clinic in 4 months routine care or sooner if problems arise     Nikita Martínez DPM

## 2022-12-02 ENCOUNTER — OFFICE VISIT (OUTPATIENT)
Dept: OTOLARYNGOLOGY | Facility: CLINIC | Age: 67
End: 2022-12-02
Payer: MEDICARE

## 2022-12-02 VITALS — HEIGHT: 76 IN | BODY MASS INDEX: 31.55 KG/M2 | WEIGHT: 259.06 LBS

## 2022-12-02 DIAGNOSIS — H61.23 BILATERAL IMPACTED CERUMEN: ICD-10-CM

## 2022-12-02 DIAGNOSIS — H61.23 HEARING LOSS DUE TO CERUMEN IMPACTION, BILATERAL: Primary | ICD-10-CM

## 2022-12-02 PROCEDURE — 4010F ACE/ARB THERAPY RXD/TAKEN: CPT | Mod: CPTII,S$GLB,, | Performed by: NURSE PRACTITIONER

## 2022-12-02 PROCEDURE — 99999 PR PBB SHADOW E&M-EST. PATIENT-LVL IV: CPT | Mod: PBBFAC,,, | Performed by: NURSE PRACTITIONER

## 2022-12-02 PROCEDURE — 4010F PR ACE/ARB THEARPY RXD/TAKEN: ICD-10-PCS | Mod: CPTII,S$GLB,, | Performed by: NURSE PRACTITIONER

## 2022-12-02 PROCEDURE — 1159F MED LIST DOCD IN RCRD: CPT | Mod: CPTII,S$GLB,, | Performed by: NURSE PRACTITIONER

## 2022-12-02 PROCEDURE — 3061F PR NEG MICROALBUMINURIA RESULT DOCUMENTED/REVIEW: ICD-10-PCS | Mod: CPTII,S$GLB,, | Performed by: NURSE PRACTITIONER

## 2022-12-02 PROCEDURE — 99999 PR PBB SHADOW E&M-EST. PATIENT-LVL IV: ICD-10-PCS | Mod: PBBFAC,,, | Performed by: NURSE PRACTITIONER

## 2022-12-02 PROCEDURE — 3044F PR MOST RECENT HEMOGLOBIN A1C LEVEL <7.0%: ICD-10-PCS | Mod: CPTII,S$GLB,, | Performed by: NURSE PRACTITIONER

## 2022-12-02 PROCEDURE — 3008F BODY MASS INDEX DOCD: CPT | Mod: CPTII,S$GLB,, | Performed by: NURSE PRACTITIONER

## 2022-12-02 PROCEDURE — 3066F PR DOCUMENTATION OF TREATMENT FOR NEPHROPATHY: ICD-10-PCS | Mod: CPTII,S$GLB,, | Performed by: NURSE PRACTITIONER

## 2022-12-02 PROCEDURE — 69210 REMOVE IMPACTED EAR WAX UNI: CPT | Mod: S$GLB,,, | Performed by: NURSE PRACTITIONER

## 2022-12-02 PROCEDURE — 1159F PR MEDICATION LIST DOCUMENTED IN MEDICAL RECORD: ICD-10-PCS | Mod: CPTII,S$GLB,, | Performed by: NURSE PRACTITIONER

## 2022-12-02 PROCEDURE — 1101F PR PT FALLS ASSESS DOC 0-1 FALLS W/OUT INJ PAST YR: ICD-10-PCS | Mod: CPTII,S$GLB,, | Performed by: NURSE PRACTITIONER

## 2022-12-02 PROCEDURE — 3066F NEPHROPATHY DOC TX: CPT | Mod: CPTII,S$GLB,, | Performed by: NURSE PRACTITIONER

## 2022-12-02 PROCEDURE — 99499 UNLISTED E&M SERVICE: CPT | Mod: S$GLB,,, | Performed by: NURSE PRACTITIONER

## 2022-12-02 PROCEDURE — 1101F PT FALLS ASSESS-DOCD LE1/YR: CPT | Mod: CPTII,S$GLB,, | Performed by: NURSE PRACTITIONER

## 2022-12-02 PROCEDURE — 3008F PR BODY MASS INDEX (BMI) DOCUMENTED: ICD-10-PCS | Mod: CPTII,S$GLB,, | Performed by: NURSE PRACTITIONER

## 2022-12-02 PROCEDURE — 3288F FALL RISK ASSESSMENT DOCD: CPT | Mod: CPTII,S$GLB,, | Performed by: NURSE PRACTITIONER

## 2022-12-02 PROCEDURE — 3072F PR LOW RISK FOR RETINOPATHY: ICD-10-PCS | Mod: CPTII,S$GLB,, | Performed by: NURSE PRACTITIONER

## 2022-12-02 PROCEDURE — 99499 NO LOS: ICD-10-PCS | Mod: S$GLB,,, | Performed by: NURSE PRACTITIONER

## 2022-12-02 PROCEDURE — 3044F HG A1C LEVEL LT 7.0%: CPT | Mod: CPTII,S$GLB,, | Performed by: NURSE PRACTITIONER

## 2022-12-02 PROCEDURE — 3288F PR FALLS RISK ASSESSMENT DOCUMENTED: ICD-10-PCS | Mod: CPTII,S$GLB,, | Performed by: NURSE PRACTITIONER

## 2022-12-02 PROCEDURE — 69210 PR REMOVAL IMPACTED CERUMEN REQUIRING INSTRUMENTATION, UNILATERAL: ICD-10-PCS | Mod: S$GLB,,, | Performed by: NURSE PRACTITIONER

## 2022-12-02 PROCEDURE — 3061F NEG MICROALBUMINURIA REV: CPT | Mod: CPTII,S$GLB,, | Performed by: NURSE PRACTITIONER

## 2022-12-02 PROCEDURE — 3072F LOW RISK FOR RETINOPATHY: CPT | Mod: CPTII,S$GLB,, | Performed by: NURSE PRACTITIONER

## 2022-12-02 NOTE — PROGRESS NOTES
Subjective:       Patient ID: Samuel Cast is a 67 y.o. male.    Chief Complaint: Cerumen Impaction    HPI  Patient's wife is historian. Patient have CVA and is now wheelchair bound and does not speak much. Wife states she has had to repeat herself 2-3 times to get his attention. She suspects he has cerumen impactions. His ears were last cleaned >3 years ago while they were still living [place].     Review of Systems   Constitutional: Negative.    HENT: Negative.     Eyes: Negative.    Respiratory: Negative.     Cardiovascular: Negative.    Gastrointestinal: Negative.    Musculoskeletal: Negative.    Integumentary:  Negative.   Neurological: Negative.    Hematological: Negative.    Psychiatric/Behavioral: Negative.         Objective:      Physical Exam  Vitals and nursing note reviewed.   Constitutional:       General: He is not in acute distress.     Appearance: He is well-developed. He is not ill-appearing.      Comments: Did not attempt to transfer patient from his wheelchair for duration of visit today   HENT:      Head: Normocephalic and atraumatic.      Right Ear: Hearing, tympanic membrane, ear canal and external ear normal. No middle ear effusion. Tympanic membrane is not erythematous.      Left Ear: Hearing, tympanic membrane, ear canal and external ear normal.  No middle ear effusion. Tympanic membrane is not erythematous.      Nose: Nose normal.   Eyes:      General: Lids are normal. No scleral icterus.        Right eye: No discharge.         Left eye: No discharge.   Neck:      Trachea: Trachea normal. No tracheal deviation.   Cardiovascular:      Rate and Rhythm: Normal rate.   Pulmonary:      Effort: Pulmonary effort is normal. No respiratory distress.      Breath sounds: No stridor. No wheezing.   Musculoskeletal:         General: Normal range of motion.      Cervical back: Normal range of motion.   Skin:     General: Skin is warm and dry.   Neurological:      Mental Status: He is alert and  oriented to person, place, and time.      Coordination: Coordination is intact.      Gait: Gait normal.   Psychiatric:         Attention and Perception: Attention normal.         Mood and Affect: Mood normal.         Speech: Speech normal.         Behavior: Behavior normal. Behavior is cooperative.       SEPARATE PROCEDURE IN OFFICE:   Procedure: Removal of impacted cerumen, bilateral   Pre Procedure Diagnosis: Cerumen Impaction   Post Procedure Diagnosis: Cerumen Impaction   Verbal informed consent in regards to risk of trauma to ear canal, ear drum or hearing, discomfort during procedure and/or inability to remove cerumen impaction in one session or unforeseen events or complications.   No anesthesia.     Procedure in detail:   Ear canal visualized bilateral with appropriate size ear speculum utilizing Operating Head Binocular Otomicroscope   Utilizing the following: delicate alligator forceps and suction cannula was used. The impacted cerumen of the ear canals was removed atraumatically. The TM and EAC were then inspected and found to be clear of wax. See description of TMs/EACs in PE above.   Complications: No   Condition: Improved/Good    Assessment:       Problem List Items Addressed This Visit    None  Visit Diagnoses       Bilateral impacted cerumen                  Plan:     Debrox prn    Audiogram prn  Patient encouraged to return to clinic if symptoms worsen/persist and as needed for further ENT symptoms or concerns.

## 2022-12-05 ENCOUNTER — HOSPITAL ENCOUNTER (OUTPATIENT)
Dept: RADIOLOGY | Facility: HOSPITAL | Age: 67
Discharge: HOME | End: 2022-12-05
Attending: NURSE PRACTITIONER
Payer: MEDICARE

## 2022-12-05 DIAGNOSIS — Z87.81 HISTORY OF COMPRESSION FRACTURE OF SPINE: ICD-10-CM

## 2022-12-05 PROCEDURE — 77080 DXA BONE DENSITY AXIAL: CPT | Mod: 26,59,, | Performed by: RADIOLOGY

## 2022-12-05 PROCEDURE — 77080 DEXA BONE DENSITY SPINE HIP: ICD-10-PCS | Mod: 26,59,, | Performed by: RADIOLOGY

## 2022-12-05 PROCEDURE — 77081 DXA BONE DENSITY APPENDICULR: CPT | Mod: TC,PO

## 2022-12-05 PROCEDURE — 77081 DEXA BONE DENSITY APPENDICULAR SKELETON: ICD-10-PCS | Mod: 26,,, | Performed by: RADIOLOGY

## 2022-12-05 PROCEDURE — 77081 DXA BONE DENSITY APPENDICULR: CPT | Mod: 26,,, | Performed by: RADIOLOGY

## 2022-12-05 PROCEDURE — 77080 DXA BONE DENSITY AXIAL: CPT | Mod: TC,59,PO

## 2022-12-07 ENCOUNTER — OFFICE VISIT (OUTPATIENT)
Dept: NEUROLOGY | Facility: CLINIC | Age: 67
End: 2022-12-07
Payer: MEDICARE

## 2022-12-07 VITALS
HEART RATE: 62 BPM | SYSTOLIC BLOOD PRESSURE: 125 MMHG | WEIGHT: 259 LBS | BODY MASS INDEX: 31.53 KG/M2 | DIASTOLIC BLOOD PRESSURE: 78 MMHG

## 2022-12-07 DIAGNOSIS — Z86.73 HISTORY OF MULTIPLE STROKES: ICD-10-CM

## 2022-12-07 DIAGNOSIS — I69.359 HEMIPARESIS FOLLOWING CEREBROVASCULAR ACCIDENT (CVA): ICD-10-CM

## 2022-12-07 DIAGNOSIS — I48.0 PAROXYSMAL ATRIAL FIBRILLATION: ICD-10-CM

## 2022-12-07 DIAGNOSIS — G40.909 SEIZURE DISORDER: Primary | ICD-10-CM

## 2022-12-07 DIAGNOSIS — I65.23 BILATERAL CAROTID ARTERY STENOSIS: ICD-10-CM

## 2022-12-07 DIAGNOSIS — I10 PRIMARY HYPERTENSION: ICD-10-CM

## 2022-12-07 DIAGNOSIS — Z86.79 HISTORY OF SUBARACHNOID HEMORRHAGE: ICD-10-CM

## 2022-12-07 PROCEDURE — 3074F PR MOST RECENT SYSTOLIC BLOOD PRESSURE < 130 MM HG: ICD-10-PCS | Mod: CPTII,S$GLB,, | Performed by: NURSE PRACTITIONER

## 2022-12-07 PROCEDURE — 3044F PR MOST RECENT HEMOGLOBIN A1C LEVEL <7.0%: ICD-10-PCS | Mod: CPTII,S$GLB,, | Performed by: NURSE PRACTITIONER

## 2022-12-07 PROCEDURE — 3072F LOW RISK FOR RETINOPATHY: CPT | Mod: CPTII,S$GLB,, | Performed by: NURSE PRACTITIONER

## 2022-12-07 PROCEDURE — 4010F PR ACE/ARB THEARPY RXD/TAKEN: ICD-10-PCS | Mod: CPTII,S$GLB,, | Performed by: NURSE PRACTITIONER

## 2022-12-07 PROCEDURE — 3072F PR LOW RISK FOR RETINOPATHY: ICD-10-PCS | Mod: CPTII,S$GLB,, | Performed by: NURSE PRACTITIONER

## 2022-12-07 PROCEDURE — 3288F PR FALLS RISK ASSESSMENT DOCUMENTED: ICD-10-PCS | Mod: CPTII,S$GLB,, | Performed by: NURSE PRACTITIONER

## 2022-12-07 PROCEDURE — 99999 PR PBB SHADOW E&M-EST. PATIENT-LVL V: ICD-10-PCS | Mod: PBBFAC,,, | Performed by: NURSE PRACTITIONER

## 2022-12-07 PROCEDURE — 3066F NEPHROPATHY DOC TX: CPT | Mod: CPTII,S$GLB,, | Performed by: NURSE PRACTITIONER

## 2022-12-07 PROCEDURE — 4010F ACE/ARB THERAPY RXD/TAKEN: CPT | Mod: CPTII,S$GLB,, | Performed by: NURSE PRACTITIONER

## 2022-12-07 PROCEDURE — 99213 OFFICE O/P EST LOW 20 MIN: CPT | Mod: S$GLB,,, | Performed by: NURSE PRACTITIONER

## 2022-12-07 PROCEDURE — 99999 PR PBB SHADOW E&M-EST. PATIENT-LVL V: CPT | Mod: PBBFAC,,, | Performed by: NURSE PRACTITIONER

## 2022-12-07 PROCEDURE — 3008F BODY MASS INDEX DOCD: CPT | Mod: CPTII,S$GLB,, | Performed by: NURSE PRACTITIONER

## 2022-12-07 PROCEDURE — 1159F MED LIST DOCD IN RCRD: CPT | Mod: CPTII,S$GLB,, | Performed by: NURSE PRACTITIONER

## 2022-12-07 PROCEDURE — 3074F SYST BP LT 130 MM HG: CPT | Mod: CPTII,S$GLB,, | Performed by: NURSE PRACTITIONER

## 2022-12-07 PROCEDURE — 3066F PR DOCUMENTATION OF TREATMENT FOR NEPHROPATHY: ICD-10-PCS | Mod: CPTII,S$GLB,, | Performed by: NURSE PRACTITIONER

## 2022-12-07 PROCEDURE — 3288F FALL RISK ASSESSMENT DOCD: CPT | Mod: CPTII,S$GLB,, | Performed by: NURSE PRACTITIONER

## 2022-12-07 PROCEDURE — 1159F PR MEDICATION LIST DOCUMENTED IN MEDICAL RECORD: ICD-10-PCS | Mod: CPTII,S$GLB,, | Performed by: NURSE PRACTITIONER

## 2022-12-07 PROCEDURE — 3044F HG A1C LEVEL LT 7.0%: CPT | Mod: CPTII,S$GLB,, | Performed by: NURSE PRACTITIONER

## 2022-12-07 PROCEDURE — 3078F PR MOST RECENT DIASTOLIC BLOOD PRESSURE < 80 MM HG: ICD-10-PCS | Mod: CPTII,S$GLB,, | Performed by: NURSE PRACTITIONER

## 2022-12-07 PROCEDURE — 3061F PR NEG MICROALBUMINURIA RESULT DOCUMENTED/REVIEW: ICD-10-PCS | Mod: CPTII,S$GLB,, | Performed by: NURSE PRACTITIONER

## 2022-12-07 PROCEDURE — 1100F PR PT FALLS ASSESS DOC 2+ FALLS/FALL W/INJURY/YR: ICD-10-PCS | Mod: CPTII,S$GLB,, | Performed by: NURSE PRACTITIONER

## 2022-12-07 PROCEDURE — 3061F NEG MICROALBUMINURIA REV: CPT | Mod: CPTII,S$GLB,, | Performed by: NURSE PRACTITIONER

## 2022-12-07 PROCEDURE — 99213 PR OFFICE/OUTPT VISIT, EST, LEVL III, 20-29 MIN: ICD-10-PCS | Mod: S$GLB,,, | Performed by: NURSE PRACTITIONER

## 2022-12-07 PROCEDURE — 1111F PR DISCHARGE MEDS RECONCILED W/ CURRENT OUTPATIENT MED LIST: ICD-10-PCS | Mod: CPTII,S$GLB,, | Performed by: NURSE PRACTITIONER

## 2022-12-07 PROCEDURE — 1111F DSCHRG MED/CURRENT MED MERGE: CPT | Mod: CPTII,S$GLB,, | Performed by: NURSE PRACTITIONER

## 2022-12-07 PROCEDURE — 3008F PR BODY MASS INDEX (BMI) DOCUMENTED: ICD-10-PCS | Mod: CPTII,S$GLB,, | Performed by: NURSE PRACTITIONER

## 2022-12-07 PROCEDURE — 1100F PTFALLS ASSESS-DOCD GE2>/YR: CPT | Mod: CPTII,S$GLB,, | Performed by: NURSE PRACTITIONER

## 2022-12-07 PROCEDURE — 3078F DIAST BP <80 MM HG: CPT | Mod: CPTII,S$GLB,, | Performed by: NURSE PRACTITIONER

## 2022-12-07 NOTE — PROGRESS NOTES
NEUROLOGY  Outpatient Follow Up Visit     [facility]  [address] [facility] [address], [place] [address]  [phone] (office) / [phone] (fax)    Patient Name:  Samuel Cast  :  1955  MR #:  [id number]  Acct #:  [id number]    Date of  Visit: 2022    Other Physicians:  Nallely Jurado MD (Primary Care Physician); No ref. provider found (Referring)      CHIEF COMPLAINT: Stroke      INTERVAL HISTORY:  22:  Samuel Cast is a 67 y.o. 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 Anderson for possible carotid intervention. He subsequently underwent R carotid stenting [place] [facility] Surgery went well and he hasn't had any issues since d/c. Has f/u with Dr. Anderson soon.     Remains on DAPT. Now on  mg instead of 81 mg and same Plavix per vascular surgery.     No events on ILR that they are aware of.     No seizures. Same Keppra.     Completed HH. Walking some at home. To get new AFO soon. No spasticity pains.     Still having some HA when he wakes up in the morning. Resolves after having some coffee. He has ALYSSA and is not on CPAP.     Prior history:  22:  Samuel Cast is a 67 y.o. male seen in hospital follow up for CVA.     The patient is new to me today, but was evaluated by Dr. Horton during a recent admission to [facility].     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 He was comatose for several weeks. He has R HP and mild aphasia since. They are not sure of the cause of the stroke. Wife notes that he was abusing ETOH and under a lot of stress back then. They can't recall what or if he was on any AP agents afterwards.     He had a traumatic  "brain bleed in April 2022 after a fall at home. He didn't require surgery. He was on phenytoin at the time. Levels were high, so he was transitioned to Keppra.     Most recently, he had a an episode of worsening aphasia. He was grunting and unable to speak. He returned to baseline by the time that he got to the ED. MRI brain showed an acute ischemic stroke in the R MCA territory.     No personal or family history of VTE.     Wife denies known history of Afib, doesn't routinely see cardiology. Never on DOAC that she is aware of. ILR was placed prior to d/c for monitoring.     He is maintained on seizure medication. He was started on this after initial stroke in 2007. He had a seizure when they tried to take him off of it. No seizures since. No SE from Rx.     Not having HA. Back to baseline. Getting home health, therapy.     [facility] Neurology Consultation 8/21/22:  "Mr. Cast is a 66 year old man with history of left MCA stroke with resultant right hemiplegia and aphasia who presented for episode and was found to have a new right MCA territory ischemic stroke on MRI. He is unable to give much history so I called his wife who gave additional history.      She notes that he had a stroke in June 2007. She states this was an ischemic stroke but that he was in a coma for 6 weeks. He has remained paralyzed on his right side and with aphasia since that time.      Then he had a "brain bleed" last fall. She is unclear if he has a h/o afib though this is documented in his chart. She is unsure if he was on blood thinners in the past.      She states yesterday he had a late lunch and fell asleep about 3PM. He then woke up and was grunting and unable to speak. He was awake and alert and looked fearful. He was able to follow commands. She called EMS and by the time he arrived to the hospital, he was back to baseline.      He is on keppra 750 mg BID. His wife is unsure why he is on that because she is unaware of seizures in the " "past."     Allergies:  Review of patient's allergies indicates:  No Known Allergies    Current Medications:  Current Outpatient Medications   Medication Sig Dispense Refill    aspirin 81 MG Chew Take 1 tablet (81 mg total) by mouth once daily. (Patient taking differently: Take 81 mg by mouth every morning.) 30 tablet 2    atorvastatin (LIPITOR) 80 MG tablet Take 1 tablet (80 mg total) by mouth once daily. 90 tablet 3    blood sugar diagnostic Strp To check BG 3-4 times daily, to use with insurance preferred meter. E11.65 200 strip 1    blood-glucose meter kit To check BG 3-4 times daily, to use with insurance preferred meter. E11.65 1 each 1    clopidogreL (PLAVIX) 75 mg tablet Take 1 tablet (75 mg total) by mouth once daily. Take plavix daily for only 21 days then stop per neurology recommendations 30 tablet 0    insulin glargine, TOUJEO, (TOUJEO SOLOSTAR U-300 INSULIN) 300 unit/mL (1.5 mL) InPn pen Inject 32 Units into the skin every evening. 3 pen 6    lancets Misc To check BG 3-4 times daily, to use with insurance preferred meter. E11.65 200 each 1    levETIRAcetam (KEPPRA) 750 MG Tab Take 1 tablet (750 mg total) by mouth 2 (two) times daily. 180 tablet 3    losartan (COZAAR) 25 MG tablet Take 4 tablets (100 mg total) by mouth once daily. (Patient taking differently: Take 100 mg by mouth every morning.) 90 tablet 3    magnesium oxide (MAG-OX) 400 mg (241.3 mg magnesium) tablet Take 1 tablet (400 mg total) by mouth once daily. (Patient taking differently: Take 400 mg by mouth every morning.) 30 tablet 1    metFORMIN (GLUCOPHAGE-XR) 750 MG ER 24hr tablet Take 1 tablet (750 mg total) by mouth 2 (two) times daily with meals. 180 tablet 3    metoprolol tartrate (LOPRESSOR) 50 MG tablet Take 2 tablets (100 mg total) by mouth 2 (two) times a day. 360 tablet 3    pantoprazole (PROTONIX) 20 MG tablet Take 1 tablet (20 mg total) by mouth once daily. (Patient taking differently: Take 20 mg by mouth every morning.) 90 " "tablet 3    pen needle, diabetic 32 gauge x 5/32" Ndle Use with insulin pen as prescribed. E11.65 200 each 1    sertraline (ZOLOFT) 100 MG tablet Take 1 tablet (100 mg total) by mouth once daily. 90 tablet 3    TRUEPLUS LANCETS 28 gauge Misc Apply topically.       No current facility-administered medications for this visit.       Past Medical History:  Past Medical History:   Diagnosis Date    Diabetes mellitus, type 2     Hypertension     Seizures     no seizure 2008    Stroke        Past Surgical History:  Past Surgical History:   Procedure Laterality Date    COLONOSCOPY      EPIDURAL STEROID INJECTION INTO LUMBAR SPINE N/A 7/28/2021    Procedure: Injection-steroid-epidural-lumbar L5/S1;  Surgeon: Alfonso Childers MD;  Location: [facility] OR;  Service: Pain Management;  Laterality: N/A;    FIXATION KYPHOPLASTY N/A 6/30/2021    Procedure: Kyphoplasty L1, L2, L3;  Surgeon: Alfonso Childers MD;  Location: [facility] OR;  Service: Pain Management;  Laterality: N/A;    INSERTION OF IMPLANTABLE LOOP RECORDER N/A 8/24/2022    Procedure: Insertion, Implantable Loop Recorder;  Surgeon: Jeanmarie Andrade MD;  Location: [facility] CATH;  Service: Cardiology;  Laterality: N/A;    KNEE SURGERY         Family History:  family history includes Cancer in his mother; Cancer (age of onset: 70) in his father.    Social History:   reports that he has never smoked. He has never used smokeless tobacco. He reports that he does not currently use alcohol. He reports that he does not currently use drugs.      PHYSICAL EXAM:  /78 (BP Location: Left arm, Patient Position: Sitting, BP Method: Large (Automatic))   Pulse 62   Wt 117.5 kg (259 lb)   BMI 31.53 kg/m²     General: Well groomed. NAD.  Pulmonary: Normal effort and rate.   Musculoskeletal: No obvious joint deformities, moves all extremities.  Extremities: No clubbing, cyanosis or edema.     Neurological exam:  Mental status: Awake and alert.  Oriented to person, place and " situation.  Speech/Language: mild WFD. No dysarthria. Able to follow complex commands.   Cranial nerves (II-XII): Visual fields full. Pupils equal round and reactive to light, extraocular movements intact, no ptosis, no nystagmus. Facial sensation and symmetry intact bilaterally. Hearing grossly intact. Palate deferred. Shoulder shrug normal bilaterally. Normal tongue protrusion.   Motor: dense R hemiplegia, but minimal spasticity (arm > leg).   Sensation: decreased vibration to the knees bilaterally.    DTR: 3+ on the R, 2+ on the left.   Coordination: Finger-nose-finger intact on the L.  No tremor.   Gait: deferred.     DIAGNOSTIC DATA:  I have personally reviewed provider notes, labs and imaging made available to me today.     Pt was on ASA and rosuvastatin for CVA prophylaxis dating back to 2019, when he established care in [place]. After SAH in 4/2022, ASA was discontinued.     Records indicate that Afib with RVR was capturing on tele / EKG during SAH admission. AC was contraindicated at the time. He converted to NSR spontaneously.     Imaging:  MRI brain wo 8/21/22:    Impression:  1. Subtle area of infarct without hemorrhagic transformation involving the right frontal lobe adjacent to the gyri.  2. Area of prior infarct involving the left MCA distribution.    CTA head & neck 8/22/22:  Impression:  1. 70-80% stenosis of the right ICA near the origin in the neck with associated bulky predominantly noncalcified plaque.  2. 20-30% stenosis in the left ICA near the origin in the neck.  3. 3 cm right thyroid nodule.  Correlate with any prior thyroid ultrasound if none is available consider nonemergent outpatient thyroid ultrasound for further evaluation.  4. Additional findings as above.    EEG 8/22/22:  Interpretation  The short-term video EEG shows mild diffuse nonspecific slowing of the background.  No potentially epileptiform activity was seen.     Cardiac:  TTE 8/22/22:  The left ventricle is normal in size with  concentric remodeling and normal systolic function.  Indeterminate left ventricular diastolic function.  The estimated ejection fraction is 60%.  With normal right ventricular systolic function.  There is no evidence of intracardiac shunting.  Normal central venous pressure (3 mmHg).  Technically difficult study.    EKG 5/2022 - Afib with RVR    Labs:  CBC:   Lab Results   Component Value Date    WBC 5.21 11/19/2022    HGB 11.9 (L) 11/19/2022    HCT 35.7 (L) 11/19/2022     (L) 11/19/2022    MCV 93 11/19/2022    RDW 12.7 11/19/2022     BMP:   Lab Results   Component Value Date     11/19/2022    K 3.2 (L) 11/19/2022     11/19/2022    CO2 23 11/19/2022    BUN 15 11/19/2022    CREATININE 0.6 11/19/2022     11/19/2022    CALCIUM 8.8 11/19/2022    MG 1.5 (L) 11/19/2022    PHOS 3.5 11/19/2022     LFTS;   Lab Results   Component Value Date    PROT 6.9 11/14/2022    ALBUMIN 3.7 11/14/2022    BILITOT 0.7 11/14/2022    AST 20 11/14/2022    ALKPHOS 73 11/14/2022    ALT 23 11/14/2022     COAGS:   Lab Results   Component Value Date    INR 1.1 11/14/2022     FLP:   Lab Results   Component Value Date    CHOL 139 08/22/2022    HDL 30 (L) 08/22/2022    LDLCALC 83.0 08/22/2022    TRIG 130 08/22/2022    CHOLHDL 21.6 08/22/2022     Lab Results   Component Value Date    HGBA1C 5.2 10/14/2022     Lab Results   Component Value Date    TSH 2.250 08/21/2022   No results found for: [id number]  Lab Results   Component Value Date    HGBA1C 5.2 10/14/2022         ASSESSMENT & PLAN:  Samuel Cast is a 67 y.o.  male seen in follow up for CVA, epilepsy and symptomatic R carotid stenosis.     Problem List Items Addressed This Visit          Neuro    Hemiparesis following cerebrovascular accident (CVA)    History of subarachnoid hemorrhage    Overview     April 2022 - traumatic, no surgical intervention         Seizure disorder - Primary    History of multiple strokes    Overview     June 2007 - large L MCA territory CVA  w/residual R HP and aphasia, unclear etiology but suspicious for embolic stroke given appearance, occurred while living in [place]    Aug 2021 - R frontal (MCA) ischemic stroke without clinical residual             Cardiac/Vascular    HTN (hypertension)    A-fib    Current Assessment & Plan     ILR monitoring ongoing          Bilateral carotid artery stenosis    Overview     CTA head and neck 8/2022:  70-80% R ICA, 20-30% L ICA    S/p R carotid stent 11/2022         Current Assessment & Plan     S/p R carotid stenting Nov 2022, doing well   DAPT, statin              Follow up: yearly / PRN    I spent a total of 20 minutes on the day of the visit.    This includes face to face time with the patient, as well as non-face to face time preparing for and completing the visit (review of prior diagnostic testing and clinical notes, obtaining or reviewing history, documenting clinical information in the EMR, independently interpreting and communicating results to the patient/family and coordinating ongoing care).               I appreciate the opportunity to participate in the care of this patient. Please feel free to contact me with any questions or concerns.       Carolyn Dash, ACNPC-AG  [facility]  [address] [place] [address]

## 2022-12-07 NOTE — PATIENT INSTRUCTIONS
Continue same aspirin and Plavix as directed by Dr. Anderson     Continue same seizure medication    Follow up here in 6 months to 1 year as needed     Please call if you have any recurrent symptoms (seizure) or if you have any questions.

## 2022-12-16 ENCOUNTER — CLINICAL SUPPORT (OUTPATIENT)
Dept: CARDIOLOGY | Facility: HOSPITAL | Age: 67
End: 2022-12-16
Attending: NEUROLOGICAL SURGERY
Payer: MEDICARE

## 2022-12-16 DIAGNOSIS — I65.21 STENOSIS OF RIGHT CAROTID ARTERY: ICD-10-CM

## 2022-12-16 PROCEDURE — 93880 EXTRACRANIAL BILAT STUDY: CPT | Mod: PO

## 2022-12-16 PROCEDURE — 93880 CV US DOPPLER CAROTID (CUPID ONLY): ICD-10-PCS | Mod: 26,,, | Performed by: INTERNAL MEDICINE

## 2022-12-16 PROCEDURE — 93880 EXTRACRANIAL BILAT STUDY: CPT | Mod: 26,,, | Performed by: INTERNAL MEDICINE

## 2022-12-18 LAB
LEFT ARM DIASTOLIC BLOOD PRESSURE: 78 MMHG
LEFT ARM SYSTOLIC BLOOD PRESSURE: 125 MMHG
LEFT CBA DIAS: 6 CM/S
LEFT CBA SYS: 73 CM/S
LEFT CCA DIST DIAS: 12 CM/S
LEFT CCA DIST SYS: 55 CM/S
LEFT CCA MID DIAS: 12 CM/S
LEFT CCA MID SYS: 86 CM/S
LEFT CCA PROX DIAS: 12 CM/S
LEFT CCA PROX SYS: 88 CM/S
LEFT ECA DIAS: 9 CM/S
LEFT ECA SYS: 63 CM/S
LEFT ICA DIST DIAS: 21 CM/S
LEFT ICA DIST SYS: 56 CM/S
LEFT ICA MID DIAS: 21 CM/S
LEFT ICA MID SYS: 63 CM/S
LEFT ICA PROX DIAS: 15 CM/S
LEFT ICA PROX SYS: 53 CM/S
LEFT VERTEBRAL DIAS: 10 CM/S
LEFT VERTEBRAL SYS: 39 CM/S
OHS CV CAROTID RIGHT ICA EDV HIGHEST: 31
OHS CV CAROTID ULTRASOUND LEFT ICA/CCA RATIO: 1.15
OHS CV CAROTID ULTRASOUND RIGHT ICA/CCA RATIO: 1.71
OHS CV PV CAROTID LEFT HIGHEST CCA: 88
OHS CV PV CAROTID LEFT HIGHEST ICA: 63
OHS CV PV CAROTID RIGHT HIGHEST CCA: 77
OHS CV PV CAROTID RIGHT HIGHEST ICA: 101
OHS CV US CAROTID LEFT HIGHEST EDV: 21
RIGHT ARM DIASTOLIC BLOOD PRESSURE: 78 MMHG
RIGHT ARM SYSTOLIC BLOOD PRESSURE: 125 MMHG
RIGHT CBA DIAS: 10 CM/S
RIGHT CBA SYS: 46 CM/S
RIGHT CCA DIST DIAS: 11 CM/S
RIGHT CCA DIST SYS: 59 CM/S
RIGHT CCA MID DIAS: 11 CM/S
RIGHT CCA MID SYS: 65 CM/S
RIGHT CCA PROX DIAS: 11 CM/S
RIGHT CCA PROX SYS: 77 CM/S
RIGHT ECA DIAS: 8 CM/S
RIGHT ECA SYS: 64 CM/S
RIGHT ICA DIST DIAS: 18 CM/S
RIGHT ICA DIST SYS: 84 CM/S
RIGHT ICA MID DIAS: 31 CM/S
RIGHT ICA MID SYS: 101 CM/S
RIGHT ICA PROX DIAS: 28 CM/S
RIGHT ICA PROX SYS: 101 CM/S
RIGHT VERTEBRAL DIAS: 13 CM/S
RIGHT VERTEBRAL SYS: 41 CM/S

## 2022-12-20 ENCOUNTER — OFFICE VISIT (OUTPATIENT)
Dept: NEUROSURGERY | Facility: CLINIC | Age: 67
End: 2022-12-20
Payer: MEDICARE

## 2022-12-20 VITALS
WEIGHT: 259 LBS | RESPIRATION RATE: 18 BRPM | HEART RATE: 64 BPM | BODY MASS INDEX: 31.54 KG/M2 | HEIGHT: 76 IN | SYSTOLIC BLOOD PRESSURE: 124 MMHG | DIASTOLIC BLOOD PRESSURE: 72 MMHG

## 2022-12-20 DIAGNOSIS — I65.21 STENOSIS OF RIGHT CAROTID ARTERY: Primary | ICD-10-CM

## 2022-12-20 PROCEDURE — 1159F PR MEDICATION LIST DOCUMENTED IN MEDICAL RECORD: ICD-10-PCS | Mod: CPTII,S$GLB,, | Performed by: NEUROLOGICAL SURGERY

## 2022-12-20 PROCEDURE — 3078F PR MOST RECENT DIASTOLIC BLOOD PRESSURE < 80 MM HG: ICD-10-PCS | Mod: CPTII,S$GLB,, | Performed by: NEUROLOGICAL SURGERY

## 2022-12-20 PROCEDURE — 3288F PR FALLS RISK ASSESSMENT DOCUMENTED: ICD-10-PCS | Mod: CPTII,S$GLB,, | Performed by: NEUROLOGICAL SURGERY

## 2022-12-20 PROCEDURE — 3061F NEG MICROALBUMINURIA REV: CPT | Mod: CPTII,S$GLB,, | Performed by: NEUROLOGICAL SURGERY

## 2022-12-20 PROCEDURE — 3078F DIAST BP <80 MM HG: CPT | Mod: CPTII,S$GLB,, | Performed by: NEUROLOGICAL SURGERY

## 2022-12-20 PROCEDURE — 3072F LOW RISK FOR RETINOPATHY: CPT | Mod: CPTII,S$GLB,, | Performed by: NEUROLOGICAL SURGERY

## 2022-12-20 PROCEDURE — 99214 PR OFFICE/OUTPT VISIT, EST, LEVL IV, 30-39 MIN: ICD-10-PCS | Mod: S$GLB,,, | Performed by: NEUROLOGICAL SURGERY

## 2022-12-20 PROCEDURE — 99214 OFFICE O/P EST MOD 30 MIN: CPT | Mod: S$GLB,,, | Performed by: NEUROLOGICAL SURGERY

## 2022-12-20 PROCEDURE — 3074F SYST BP LT 130 MM HG: CPT | Mod: CPTII,S$GLB,, | Performed by: NEUROLOGICAL SURGERY

## 2022-12-20 PROCEDURE — 3061F PR NEG MICROALBUMINURIA RESULT DOCUMENTED/REVIEW: ICD-10-PCS | Mod: CPTII,S$GLB,, | Performed by: NEUROLOGICAL SURGERY

## 2022-12-20 PROCEDURE — 4010F PR ACE/ARB THEARPY RXD/TAKEN: ICD-10-PCS | Mod: CPTII,S$GLB,, | Performed by: NEUROLOGICAL SURGERY

## 2022-12-20 PROCEDURE — 3072F PR LOW RISK FOR RETINOPATHY: ICD-10-PCS | Mod: CPTII,S$GLB,, | Performed by: NEUROLOGICAL SURGERY

## 2022-12-20 PROCEDURE — 3066F NEPHROPATHY DOC TX: CPT | Mod: CPTII,S$GLB,, | Performed by: NEUROLOGICAL SURGERY

## 2022-12-20 PROCEDURE — 3074F PR MOST RECENT SYSTOLIC BLOOD PRESSURE < 130 MM HG: ICD-10-PCS | Mod: CPTII,S$GLB,, | Performed by: NEUROLOGICAL SURGERY

## 2022-12-20 PROCEDURE — 3288F FALL RISK ASSESSMENT DOCD: CPT | Mod: CPTII,S$GLB,, | Performed by: NEUROLOGICAL SURGERY

## 2022-12-20 PROCEDURE — 3066F PR DOCUMENTATION OF TREATMENT FOR NEPHROPATHY: ICD-10-PCS | Mod: CPTII,S$GLB,, | Performed by: NEUROLOGICAL SURGERY

## 2022-12-20 PROCEDURE — 1159F MED LIST DOCD IN RCRD: CPT | Mod: CPTII,S$GLB,, | Performed by: NEUROLOGICAL SURGERY

## 2022-12-20 PROCEDURE — 3044F PR MOST RECENT HEMOGLOBIN A1C LEVEL <7.0%: ICD-10-PCS | Mod: CPTII,S$GLB,, | Performed by: NEUROLOGICAL SURGERY

## 2022-12-20 PROCEDURE — 1100F PR PT FALLS ASSESS DOC 2+ FALLS/FALL W/INJURY/YR: ICD-10-PCS | Mod: CPTII,S$GLB,, | Performed by: NEUROLOGICAL SURGERY

## 2022-12-20 PROCEDURE — 3008F BODY MASS INDEX DOCD: CPT | Mod: CPTII,S$GLB,, | Performed by: NEUROLOGICAL SURGERY

## 2022-12-20 PROCEDURE — 4010F ACE/ARB THERAPY RXD/TAKEN: CPT | Mod: CPTII,S$GLB,, | Performed by: NEUROLOGICAL SURGERY

## 2022-12-20 PROCEDURE — 1100F PTFALLS ASSESS-DOCD GE2>/YR: CPT | Mod: CPTII,S$GLB,, | Performed by: NEUROLOGICAL SURGERY

## 2022-12-20 PROCEDURE — 3044F HG A1C LEVEL LT 7.0%: CPT | Mod: CPTII,S$GLB,, | Performed by: NEUROLOGICAL SURGERY

## 2022-12-20 PROCEDURE — 3008F PR BODY MASS INDEX (BMI) DOCUMENTED: ICD-10-PCS | Mod: CPTII,S$GLB,, | Performed by: NEUROLOGICAL SURGERY

## 2022-12-20 NOTE — PROGRESS NOTES
Neurosurgery History & Physical    Patient ID: Samuel Cast is a 67 y.o. male.    No chief complaint on file.      HPI:  Mr. Cast is a 67-year-old gentleman with history of diabetes, hypertension, and left ischemic stroke in 2007.  As a result of that stroke he has paralysis on the right-hand side as well as an expressive aphasia.  It is unclear the etiology of that stroke.  He sustained a new stroke in the right hemisphere in August of 2022.  He was not on any antiplatelet agents at that time and has since been started on 81 mg of aspirin.  MRI at that time revealed a right frontal infarct in CTA of the neck revealed right internal carotid artery stenosis.  At office visit in October of 2022 I discussed symptomatic right internal internal carotid artery stenosis.  Given his degree of stenosis greater than 70% I recommended revascularization of the carotid artery.  Given the high location of the stenosis I favored carotid sending over endarterectomy for less risk.    He underwent this procedure on 11/18/2022 and he tolerated the procedure well.  He denies any new issues since that time.      Review of Systems  Denies F/C/N/V    Past Medical History:   Diagnosis Date    Diabetes mellitus, type 2     Hypertension     Seizures     no seizure 2008    Stroke      Social History     Socioeconomic History    Marital status:    Tobacco Use    Smoking status: Never    Smokeless tobacco: Never   Substance and Sexual Activity    Alcohol use: Not Currently     Comment: used to drink 16beer a day for 30 yrs.    Drug use: Not Currently     Social Determinants of Health     Financial Resource Strain: Low Risk     Difficulty of Paying Living Expenses: Not hard at all   Food Insecurity: No Food Insecurity    Worried About Running Out of Food in the Last Year: Never true    Ran Out of Food in the Last Year: Never true   Transportation Needs: No Transportation Needs    Lack of Transportation (Medical): No    Lack of  Transportation (Non-Medical): No   Physical Activity: Inactive    Days of Exercise per Week: 0 days    Minutes of Exercise per Session: 0 min   Stress: No Stress Concern Present    Feeling of Stress : Not at all   Social Connections: Moderately Isolated    Frequency of Communication with Friends and Family: Once a week    Frequency of Social Gatherings with Friends and Family: Once a week    Attends [employer] Services: More than 4 times per year    Active Member of Clubs or Organizations: No    Attends Club or Organization Meetings: Never    Marital Status:    Housing Stability: Low Risk     Unable to Pay for Housing in the Last Year: No    Number of Places Lived in the Last Year: 2    Unstable Housing in the Last Year: No     Family History   Problem Relation Age of Onset    Cancer Mother         pancreatic cancer    Cancer Father 70        colon cancer    Glaucoma Neg Hx      Review of patient's allergies indicates:  No Known Allergies    Current Outpatient Medications:     aspirin 81 MG Chew, Take 1 tablet (81 mg total) by mouth once daily. (Patient taking differently: Take 81 mg by mouth every morning.), Disp: 30 tablet, Rfl: 2    atorvastatin (LIPITOR) 80 MG tablet, Take 1 tablet (80 mg total) by mouth once daily., Disp: 90 tablet, Rfl: 3    blood sugar diagnostic Strp, To check BG 3-4 times daily, to use with insurance preferred meter. E11.65, Disp: 200 strip, Rfl: 1    blood-glucose meter kit, To check BG 3-4 times daily, to use with insurance preferred meter. E11.65, Disp: 1 each, Rfl: 1    clopidogreL (PLAVIX) 75 mg tablet, Take 1 tablet (75 mg total) by mouth once daily. Take plavix daily for only 21 days then stop per neurology recommendations, Disp: 30 tablet, Rfl: 0    insulin glargine, TOUJEO, (TOUKAZ GARCÍAAR U-300 INSULIN) 300 unit/mL (1.5 mL) InPn pen, Inject 32 Units into the skin every evening., Disp: 3 pen, Rfl: 6    lancets Misc, To check BG 3-4 times daily, to use with insurance  "preferred meter. E11.65, Disp: 200 each, Rfl: 1    levETIRAcetam (KEPPRA) 750 MG Tab, Take 1 tablet (750 mg total) by mouth 2 (two) times daily., Disp: 180 tablet, Rfl: 3    losartan (COZAAR) 25 MG tablet, Take 4 tablets (100 mg total) by mouth once daily. (Patient taking differently: Take 100 mg by mouth every morning.), Disp: 90 tablet, Rfl: 3    magnesium oxide (MAG-OX) 400 mg (241.3 mg magnesium) tablet, Take 1 tablet (400 mg total) by mouth once daily. (Patient taking differently: Take 400 mg by mouth every morning.), Disp: 30 tablet, Rfl: 1    metFORMIN (GLUCOPHAGE-XR) 750 MG ER 24hr tablet, Take 1 tablet (750 mg total) by mouth 2 (two) times daily with meals., Disp: 180 tablet, Rfl: 3    metoprolol tartrate (LOPRESSOR) 50 MG tablet, Take 2 tablets (100 mg total) by mouth 2 (two) times a day., Disp: 360 tablet, Rfl: 3    pantoprazole (PROTONIX) 20 MG tablet, Take 1 tablet (20 mg total) by mouth once daily. (Patient taking differently: Take 20 mg by mouth every morning.), Disp: 90 tablet, Rfl: 3    pen needle, diabetic 32 gauge x 5/32" Ndle, Use with insulin pen as prescribed. E11.65, Disp: 200 each, Rfl: 1    sertraline (ZOLOFT) 100 MG tablet, Take 1 tablet (100 mg total) by mouth once daily., Disp: 90 tablet, Rfl: 3    TRUEPLUS LANCETS 28 gauge Misc, Apply topically., Disp: , Rfl:   There were no vitals taken for this visit.      Neurologic Exam  AAOx, NAD  Follows commands well  Right upper and lower extremity weakness arm > leg   Strength 5/5 in the left upper and lower extremity    Imaging:  Carotid ultrasound is personally reviewed revealing no significant stenosis of the right internal carotid artery within the stent.    Assessment/Plan:   67 year old male 1 month s/p R JOHN    Plan for follow-up at the 6 month jose for repeat carotid ultrasound    Ok to stop plavix and continue on  mg.  After three months he can reduce to ASA 81 mg .    "

## 2022-12-29 ENCOUNTER — PES CALL (OUTPATIENT)
Dept: ADMINISTRATIVE | Facility: CLINIC | Age: 67
End: 2022-12-29
Payer: MEDICARE

## 2023-01-03 ENCOUNTER — TELEPHONE (OUTPATIENT)
Dept: FAMILY MEDICINE | Facility: CLINIC | Age: 68
End: 2023-01-03
Payer: MEDICARE

## 2023-01-03 NOTE — TELEPHONE ENCOUNTER
----- Message from Yaritza Glaser sent at 1/3/2023  2:35 PM CST -----  Contact: Charisse (Wife)  [phone]  Type:  Needs Medical Advice    Who Called: Charisse (Wife)   Symptoms (please be specific): Diarrhea and headache  How long has patient had these symptoms: 2 so far, intermittent every other day for about week  Pharmacy name and phone #: [facility] - [place], [place] - [facility] [address] [facility] [address], [phone]  Would the patient rather a call back or a response via [facility]? Phone call   Best Call Back Number: [phone]  Additional Information:

## 2023-01-03 NOTE — TELEPHONE ENCOUNTER
Pt has had diarrhea off and on for one week.  Pt's wife states she has not given pt over the counter medication to help with the diarrhea because she wants to make sure it will not interfere with his daily medications.  She is asking if she should give the over the counter medication of if a prescription could be sent.  Offered an appointment to pt's wife, pt's wife refused.  Stated it would be difficult to get pt here for appt because diarrhea is happening so frequently

## 2023-01-04 NOTE — TELEPHONE ENCOUNTER
Spoke to pt's wife and advised her of what Dr. Jurado stated in her note.  Advised to give immodium if diarrhea comes back and to get an OTC probiotic.  Advised that Dr. Jurado would like a stool sample if diarrhea persists for a few more days.

## 2023-01-12 ENCOUNTER — OFFICE VISIT (OUTPATIENT)
Dept: OPTOMETRY | Facility: CLINIC | Age: 68
End: 2023-01-12
Payer: MEDICARE

## 2023-01-12 DIAGNOSIS — H52.4 HYPEROPIA WITH ASTIGMATISM AND PRESBYOPIA, BILATERAL: ICD-10-CM

## 2023-01-12 DIAGNOSIS — H35.033 HYPERTENSIVE RETINOPATHY OF BOTH EYES: ICD-10-CM

## 2023-01-12 DIAGNOSIS — H52.03 HYPEROPIA WITH ASTIGMATISM AND PRESBYOPIA, BILATERAL: ICD-10-CM

## 2023-01-12 DIAGNOSIS — E11.36 CATARACT ASSOCIATED WITH TYPE 2 DIABETES MELLITUS: ICD-10-CM

## 2023-01-12 DIAGNOSIS — Z13.5 GLAUCOMA SCREENING: ICD-10-CM

## 2023-01-12 DIAGNOSIS — E11.9 DIABETES MELLITUS TYPE 2 WITHOUT RETINOPATHY: Primary | ICD-10-CM

## 2023-01-12 DIAGNOSIS — H52.203 HYPEROPIA WITH ASTIGMATISM AND PRESBYOPIA, BILATERAL: ICD-10-CM

## 2023-01-12 PROCEDURE — 1126F PR PAIN SEVERITY QUANTIFIED, NO PAIN PRESENT: ICD-10-PCS | Mod: CPTII,S$GLB,, | Performed by: OPTOMETRIST

## 2023-01-12 PROCEDURE — 92014 COMPRE OPH EXAM EST PT 1/>: CPT | Mod: S$GLB,,, | Performed by: OPTOMETRIST

## 2023-01-12 PROCEDURE — 1160F RVW MEDS BY RX/DR IN RCRD: CPT | Mod: CPTII,S$GLB,, | Performed by: OPTOMETRIST

## 2023-01-12 PROCEDURE — 99999 PR PBB SHADOW E&M-EST. PATIENT-LVL IV: CPT | Mod: PBBFAC,,, | Performed by: OPTOMETRIST

## 2023-01-12 PROCEDURE — 92014 PR EYE EXAM, EST PATIENT,COMPREHESV: ICD-10-PCS | Mod: S$GLB,,, | Performed by: OPTOMETRIST

## 2023-01-12 PROCEDURE — 3288F PR FALLS RISK ASSESSMENT DOCUMENTED: ICD-10-PCS | Mod: CPTII,S$GLB,, | Performed by: OPTOMETRIST

## 2023-01-12 PROCEDURE — 92015 PR REFRACTION: ICD-10-PCS | Mod: S$GLB,,, | Performed by: OPTOMETRIST

## 2023-01-12 PROCEDURE — 3288F FALL RISK ASSESSMENT DOCD: CPT | Mod: CPTII,S$GLB,, | Performed by: OPTOMETRIST

## 2023-01-12 PROCEDURE — 1100F PR PT FALLS ASSESS DOC 2+ FALLS/FALL W/INJURY/YR: ICD-10-PCS | Mod: CPTII,S$GLB,, | Performed by: OPTOMETRIST

## 2023-01-12 PROCEDURE — 2023F DILAT RTA XM W/O RTNOPTHY: CPT | Mod: CPTII,S$GLB,, | Performed by: OPTOMETRIST

## 2023-01-12 PROCEDURE — 99999 PR PBB SHADOW E&M-EST. PATIENT-LVL IV: ICD-10-PCS | Mod: PBBFAC,,, | Performed by: OPTOMETRIST

## 2023-01-12 PROCEDURE — 1159F MED LIST DOCD IN RCRD: CPT | Mod: CPTII,S$GLB,, | Performed by: OPTOMETRIST

## 2023-01-12 PROCEDURE — 1160F PR REVIEW ALL MEDS BY PRESCRIBER/CLIN PHARMACIST DOCUMENTED: ICD-10-PCS | Mod: CPTII,S$GLB,, | Performed by: OPTOMETRIST

## 2023-01-12 PROCEDURE — 1159F PR MEDICATION LIST DOCUMENTED IN MEDICAL RECORD: ICD-10-PCS | Mod: CPTII,S$GLB,, | Performed by: OPTOMETRIST

## 2023-01-12 PROCEDURE — 92015 DETERMINE REFRACTIVE STATE: CPT | Mod: S$GLB,,, | Performed by: OPTOMETRIST

## 2023-01-12 PROCEDURE — 1126F AMNT PAIN NOTED NONE PRSNT: CPT | Mod: CPTII,S$GLB,, | Performed by: OPTOMETRIST

## 2023-01-12 PROCEDURE — 2023F PR DILATED RETINAL EXAM W/O EVID OF RETINOPATHY: ICD-10-PCS | Mod: CPTII,S$GLB,, | Performed by: OPTOMETRIST

## 2023-01-12 PROCEDURE — 1100F PTFALLS ASSESS-DOCD GE2>/YR: CPT | Mod: CPTII,S$GLB,, | Performed by: OPTOMETRIST

## 2023-01-12 NOTE — PROGRESS NOTES
HPI    Last DFE 11/19/21  + DM2    LBS: Last night 102    States VA is not blurry with glasses on.  Denies flashes, floaters, pain, or use of gtts.     Hemoglobin A1C       Date                     Value               Ref Range             Status                10/14/2022               5.2                 4.0 - 5.6 %           Final                   08/10/2022               8.6 (H)             4.0 - 5.6 %           Final                   04/27/2022               5.4                 0.0 - 5.6 %           Final            Last edited by Rachel Ascencio on 1/12/2023  9:29 AM.        ROS    Positive for: Eyes  Negative for: Constitutional, Gastrointestinal, Neurological, Skin,   Genitourinary, Musculoskeletal, HENT, Endocrine, Cardiovascular,   Respiratory, Psychiatric, Allergic/Imm, Heme/Lymph  Last edited by LASHAWN Villeda, OD on 1/12/2023 10:05 AM.        Assessment /Plan     For exam results, see Encounter Report.    Diabetes mellitus type 2 without retinopathy    Hypertensive retinopathy of both eyes    Cataract associated with type 2 diabetes mellitus    Glaucoma screening    Hyperopia with astigmatism and presbyopia, bilateral      No orestes/ no csme, gave Diabetic Retinopathy info, advise tight control glucose, BP---Advise annual dilated fundus exam  Vascular changes OU w/ nicking and Art narrowing---small area w/ blot heme and cws inf to disc OD ---tight control BP   Will recheck at next DFE 6 months  3.   Early vis sig  NS, not ready for consult   4.   Not suspect   5.   Updated specs rx, gave copy, ok continut with current specs      Discussed and educated patient on current findings /plan.  RTC 1 year, prn if any changes / issues

## 2023-01-12 NOTE — PATIENT INSTRUCTIONS
"DRY EYES -- BURNING OR CARRINGTON SYMPTOMS:  Use Over The Counter artificial tears as needed for dry eye symptoms.   Some common brands include:  Systane, Optive, Refresh, and Thera-Tears.  These drops can be used as frequently as desired, but may be most helpful use during long periods of concentrated work.  For example, reading / working at the computer. Start with 3-4x per day.     Nighttime Ophthalmic gel or ointments are available: Refresh PM, Genteal, and Lacrilube.    Avoid drops that "get redness out" (Visine, Murine, Clear Eyes), as these may contain medication that could further irritate the eyes, especially with chronic use.    ALLERGY EYES -- ITCHING SYMPTOMS:  Over the counter medications include--Pataday, Zaditor, and Alaway.  Use as directed 1-2 drops daily for symptoms of itching / watering eyes.  These drops will not help for dry eye or exposure symptoms.    REDNESS RELIEF:  Lumify---is a good redness reliever that will not cause irritation if used chronically.        DIABETES AND THE EYE / DIABETIC RETINOPATHY    Diabetic retinopathy is a condition occurring in persons with diabetes, which causes progressive damage to the retina, the light sensitive lining at the back of the eye. It is a serious sight-threatening complication of diabetes.    Diabetic retinopathy is the result of damage to the tiny blood vessels that nourish the retina. They leak blood and other fluids that cause swelling of retinal tissue and clouding of vision. The condition usually affects both eyes. The longer a person has diabetes, the more likely they will develop diabetic retinopathy. If left untreated, diabetic retinopathy can cause blindness.  There are two basic types of diabetic retinopathy:    Background or nonproliferative diabetic retinopathy (NPDR)  Nonproliferative diabetic retinopathy (NPDR) is the earliest stage of diabetic retinopathy. With this condition, damaged blood vessels in the retina begin to leak extra fluid " and small amounts of blood into the eye. Sometimes, deposits of cholesterol or other fats from the blood may leak into the retina. Many people with diabetes have mild NPDR, which usually does not affect their vision. However, if their vision is affected, it is the result of macular edema and macular ischemia.    If vision is affected due to macular changes, a consult with a Retina Specialist may be advised.  This is an ophthalmologist that treats retina conditions, including diabetic retinopathy.     Proliferative diabetic retinopathy (PDR)  Proliferative diabetic retinopathy (PDR) mainly occurs when many of the blood vessels in the retina close, preventing enough blood flow. In an attempt to supply blood to the area where the original vessels closed, the retina responds by growing new blood vessels. This is called neovascularization. However, these new blood vessels are abnormal and do not supply the retina with proper blood flow. The new vessels are also often accompanied by scar tissue that may cause the retina to wrinkle or detach. PDR may cause more severe vision loss than NPDR because it can affect both central and peripheral vision.     A patient diagnosed with proliferative diabetic eye disease will be referred to a retinal specialist for consultation.    Often there are no visual symptoms in the early stages of diabetic retinopathy. That is why our eye care professionals recommend that everyone with diabetes have a comprehensive dilated eye examination once a year. Early detection and treatment can limit the potential for significant vision loss from diabetic retinopathy.       FLASHES / FLOATERS / POSTERIOR VITREOUS DETACHMENT    Call the clinic if you have any further changes in symptoms.  Including:  Increased numbers of floaters or flashing lights, dimness or darkness that moves through or stays constant in your vision, or any pain in the eye (s).    You may sometimes see small specks or clouds moving  in your field of vision.  They are called FLOATERS.  You can often see them when looking at a plain background, like a blank wall or blue tremayne.  Floaters are actually tiny clumps of gel or cells inside the VITREOUS, the clear jelly-like fluid that fills the inside of your eye.    While these objects look like they are in front of your eye, they are actually floating inside.  What you see are the shadows they cast on the RETINA, the nerve layer at the back of the eye that senses light and allows you to see.      POSTERIOR VITREOUS DETACHMENT    The appearance of new floaters may be alarming.  If you suddenly develop new floaters, you should contact your eye care professional  right away.    The retina can tear if the shrinking vitreous pulls away from the wall of the eye.  This sometimes causes a small amount of bleeding in the eye that may appear as new floaters.    A torn retina is always a serious problem, since it can lead to a retinal detachment.  You should see your eye care professional as soon as possible if:    even one new floater appears suddenly;  you see sudden flashes of light;  you notice other symptoms, like the loss of side vision, or a curtain closes down in your vision        POSTERIOR VITREOUS DETACHMENT is more common for people who:    are nearsighted;  have had cataract surgery;  have had YAG laser surgery of the eye;  have had inflammation inside the eye;  are over age 60.      While some floaters may remain visible, many of them will fade over time and become less noticeable.  Even if you've had some floaters for years, you should have your eyes checked as soon as possible if you notice new ones.    FLASHING LIGHTS    When the vitreous gel rubs or pulls on the retina, you may see what look like flashing lights or lightning streaks.  These flashes can appear off and on for several weeks or months.      Some people experience flashes of light that appear as jagged lines or heat waves in both  eyes, lasting 10-20 minutes.  These flashes are caused by a spasm of blood vessels in the brain, which is called a migraine.    If a headache follows these flashes, it's called a migraine headache.  If   no headache occurs, these flashes are called Ophthalmic or Ocular Migraine.

## 2023-01-19 ENCOUNTER — LAB VISIT (OUTPATIENT)
Dept: LAB | Facility: HOSPITAL | Age: 68
End: 2023-01-19
Payer: MEDICARE

## 2023-01-19 DIAGNOSIS — E11.65 TYPE 2 DIABETES MELLITUS WITH HYPERGLYCEMIA, WITHOUT LONG-TERM CURRENT USE OF INSULIN: ICD-10-CM

## 2023-01-19 LAB
ESTIMATED AVG GLUCOSE: 94 MG/DL (ref 68–131)
HBA1C MFR BLD: 4.9 % (ref 4–5.6)

## 2023-01-19 PROCEDURE — 80053 COMPREHEN METABOLIC PANEL: CPT | Performed by: NURSE PRACTITIONER

## 2023-01-19 PROCEDURE — 36415 COLL VENOUS BLD VENIPUNCTURE: CPT | Mod: PO | Performed by: NURSE PRACTITIONER

## 2023-01-19 PROCEDURE — 83036 HEMOGLOBIN GLYCOSYLATED A1C: CPT | Performed by: NURSE PRACTITIONER

## 2023-01-20 LAB
ALBUMIN SERPL BCP-MCNC: 4 G/DL (ref 3.5–5.2)
ALP SERPL-CCNC: 68 U/L (ref 55–135)
ALT SERPL W/O P-5'-P-CCNC: 24 U/L (ref 10–44)
ANION GAP SERPL CALC-SCNC: 11 MMOL/L (ref 8–16)
AST SERPL-CCNC: 19 U/L (ref 10–40)
BILIRUB SERPL-MCNC: 0.9 MG/DL (ref 0.1–1)
BUN SERPL-MCNC: 18 MG/DL (ref 8–23)
CALCIUM SERPL-MCNC: 10.2 MG/DL (ref 8.7–10.5)
CHLORIDE SERPL-SCNC: 106 MMOL/L (ref 95–110)
CO2 SERPL-SCNC: 22 MMOL/L (ref 23–29)
CREAT SERPL-MCNC: 0.8 MG/DL (ref 0.5–1.4)
EST. GFR  (NO RACE VARIABLE): >60 ML/MIN/1.73 M^2
GLUCOSE SERPL-MCNC: 78 MG/DL (ref 70–110)
POTASSIUM SERPL-SCNC: 4.7 MMOL/L (ref 3.5–5.1)
PROT SERPL-MCNC: 7.4 G/DL (ref 6–8.4)
SODIUM SERPL-SCNC: 139 MMOL/L (ref 136–145)

## 2023-01-26 ENCOUNTER — OFFICE VISIT (OUTPATIENT)
Dept: ENDOCRINOLOGY | Facility: CLINIC | Age: 68
End: 2023-01-26
Payer: MEDICARE

## 2023-01-26 VITALS
WEIGHT: 259 LBS | BODY MASS INDEX: 31.54 KG/M2 | HEART RATE: 74 BPM | SYSTOLIC BLOOD PRESSURE: 118 MMHG | DIASTOLIC BLOOD PRESSURE: 80 MMHG | HEIGHT: 76 IN

## 2023-01-26 DIAGNOSIS — I10 PRIMARY HYPERTENSION: ICD-10-CM

## 2023-01-26 DIAGNOSIS — E11.65 TYPE 2 DIABETES MELLITUS WITH HYPERGLYCEMIA, WITHOUT LONG-TERM CURRENT USE OF INSULIN: Primary | ICD-10-CM

## 2023-01-26 DIAGNOSIS — G40.909 SEIZURE DISORDER: ICD-10-CM

## 2023-01-26 DIAGNOSIS — Z86.73 HISTORY OF CARDIOEMBOLIC CEREBROVASCULAR ACCIDENT (CVA): ICD-10-CM

## 2023-01-26 PROCEDURE — 1101F PT FALLS ASSESS-DOCD LE1/YR: CPT | Mod: CPTII,S$GLB,, | Performed by: NURSE PRACTITIONER

## 2023-01-26 PROCEDURE — 3288F FALL RISK ASSESSMENT DOCD: CPT | Mod: CPTII,S$GLB,, | Performed by: NURSE PRACTITIONER

## 2023-01-26 PROCEDURE — 3044F PR MOST RECENT HEMOGLOBIN A1C LEVEL <7.0%: ICD-10-PCS | Mod: CPTII,S$GLB,, | Performed by: NURSE PRACTITIONER

## 2023-01-26 PROCEDURE — 3044F HG A1C LEVEL LT 7.0%: CPT | Mod: CPTII,S$GLB,, | Performed by: NURSE PRACTITIONER

## 2023-01-26 PROCEDURE — 1160F RVW MEDS BY RX/DR IN RCRD: CPT | Mod: CPTII,S$GLB,, | Performed by: NURSE PRACTITIONER

## 2023-01-26 PROCEDURE — 1101F PR PT FALLS ASSESS DOC 0-1 FALLS W/OUT INJ PAST YR: ICD-10-PCS | Mod: CPTII,S$GLB,, | Performed by: NURSE PRACTITIONER

## 2023-01-26 PROCEDURE — 3074F PR MOST RECENT SYSTOLIC BLOOD PRESSURE < 130 MM HG: ICD-10-PCS | Mod: CPTII,S$GLB,, | Performed by: NURSE PRACTITIONER

## 2023-01-26 PROCEDURE — 3008F PR BODY MASS INDEX (BMI) DOCUMENTED: ICD-10-PCS | Mod: CPTII,S$GLB,, | Performed by: NURSE PRACTITIONER

## 2023-01-26 PROCEDURE — 99214 PR OFFICE/OUTPT VISIT, EST, LEVL IV, 30-39 MIN: ICD-10-PCS | Mod: S$GLB,,, | Performed by: NURSE PRACTITIONER

## 2023-01-26 PROCEDURE — 99214 OFFICE O/P EST MOD 30 MIN: CPT | Mod: S$GLB,,, | Performed by: NURSE PRACTITIONER

## 2023-01-26 PROCEDURE — 3079F DIAST BP 80-89 MM HG: CPT | Mod: CPTII,S$GLB,, | Performed by: NURSE PRACTITIONER

## 2023-01-26 PROCEDURE — 1125F AMNT PAIN NOTED PAIN PRSNT: CPT | Mod: CPTII,S$GLB,, | Performed by: NURSE PRACTITIONER

## 2023-01-26 PROCEDURE — 99999 PR PBB SHADOW E&M-EST. PATIENT-LVL IV: CPT | Mod: PBBFAC,,, | Performed by: NURSE PRACTITIONER

## 2023-01-26 PROCEDURE — 1159F PR MEDICATION LIST DOCUMENTED IN MEDICAL RECORD: ICD-10-PCS | Mod: CPTII,S$GLB,, | Performed by: NURSE PRACTITIONER

## 2023-01-26 PROCEDURE — 1125F PR PAIN SEVERITY QUANTIFIED, PAIN PRESENT: ICD-10-PCS | Mod: CPTII,S$GLB,, | Performed by: NURSE PRACTITIONER

## 2023-01-26 PROCEDURE — 1159F MED LIST DOCD IN RCRD: CPT | Mod: CPTII,S$GLB,, | Performed by: NURSE PRACTITIONER

## 2023-01-26 PROCEDURE — 3074F SYST BP LT 130 MM HG: CPT | Mod: CPTII,S$GLB,, | Performed by: NURSE PRACTITIONER

## 2023-01-26 PROCEDURE — 1160F PR REVIEW ALL MEDS BY PRESCRIBER/CLIN PHARMACIST DOCUMENTED: ICD-10-PCS | Mod: CPTII,S$GLB,, | Performed by: NURSE PRACTITIONER

## 2023-01-26 PROCEDURE — 3079F PR MOST RECENT DIASTOLIC BLOOD PRESSURE 80-89 MM HG: ICD-10-PCS | Mod: CPTII,S$GLB,, | Performed by: NURSE PRACTITIONER

## 2023-01-26 PROCEDURE — 3288F PR FALLS RISK ASSESSMENT DOCUMENTED: ICD-10-PCS | Mod: CPTII,S$GLB,, | Performed by: NURSE PRACTITIONER

## 2023-01-26 PROCEDURE — 99999 PR PBB SHADOW E&M-EST. PATIENT-LVL IV: ICD-10-PCS | Mod: PBBFAC,,, | Performed by: NURSE PRACTITIONER

## 2023-01-26 PROCEDURE — 3008F BODY MASS INDEX DOCD: CPT | Mod: CPTII,S$GLB,, | Performed by: NURSE PRACTITIONER

## 2023-01-26 RX ORDER — INSULIN GLARGINE 300 U/ML
26 INJECTION, SOLUTION SUBCUTANEOUS NIGHTLY
Qty: 3 PEN | Refills: 6
Start: 2023-01-26 | End: 2023-06-16

## 2023-01-26 NOTE — PATIENT INSTRUCTIONS
Decrease Toujeo to 26 units.   Continue metformin XR.    Notify me if blood sugars are frequently < 110 or > 150

## 2023-01-26 NOTE — PROGRESS NOTES
"CC: Mr. Samuel Cast arrives today for management of Type 2 DM and review of chronic medical conditions, as listed in the Visit Diagnosis section of this encounter.       HPI: Mr. Samuel Cast was diagnosed with Type 2 DM in 2007, during workup of stroke. Initial treatment consisted of metformin, glimepiride. Insulin added in 2022, following hospital stay after subarachnoid hemorrhage. Denies FH of DM. Denies hospitalizations due to DM.   Of note, he has R hemiplegia from past CVA, as well as expressive aphasia.     He is accompanied by his wife, Charisse, who contributes to most of the history.     Patient was last seen by me in October. At that time, metformin was changed to XR form, due to abdominal cramping. Novolog was discontinued.     BG readings are checked 2x/day.          Hypoglycemia: No    Missing Insulin/PO medication doses: Rare   Timing prandial insulin 5-15 minutes before meals: yes    Exercise: Has started walking around the apt with walker. Hopes to start outpatient PT    Dietary Habits: Eats 3 meals/day. Occasional snack - SF oatmeal raisin cookie. Avoids sugary beverages.     Last DM education appointment:      CURRENT DIABETIC MEDS: metformin  mg BID, Toujeo 32 units QHS  Vial or pen: pen  Glucometer type: Relion    Previous DM treatments:  Metformin - abdominal cramping  Glimepiride   Novolog     Last Eye Exam: 1/2023, no DR  Last Podiatry Exam: 11/2022    REVIEW OF SYSTEMS  Constitutional: no c/o weight loss. + fatigue, weakness. In wheelchair.  Cardiac: no palpitations or chest pain.  Respiratory: no cough or dyspnea.  GI: no c/o abdominal pain or nausea. Denies h/o pancreatitis.  Skin: no lesions or rashes.  Neuro: no numbness, tingling, or parasthesias.  Endocrine: denies polyphagia, polydipsia, polyuria      Personally reviewed Past Medical, Surgical, Social History.    Vital Signs  /80   Pulse 74   Ht 6' 4" (1.93 m)   Wt 117.5 kg (259 lb)   BMI 31.53 kg/m²     Personally " reviewed the below labs:    Hemoglobin A1C   Date Value Ref Range Status   01/19/2023 4.9 4.0 - 5.6 % Final     Comment:     ADA Screening Guidelines:  5.7-6.4%  Consistent with prediabetes  >or=6.5%  Consistent with diabetes    High levels of fetal hemoglobin interfere with the HbA1C  assay. Heterozygous hemoglobin variants (HbS, HgC, etc)do  not significantly interfere with this assay.   However, presence of multiple variants may affect accuracy.     10/14/2022 5.2 4.0 - 5.6 % Final     Comment:     ADA Screening Guidelines:  5.7-6.4%  Consistent with prediabetes  >or=6.5%  Consistent with diabetes    High levels of fetal hemoglobin interfere with the HbA1C  assay. Heterozygous hemoglobin variants (HbS, HgC, etc)do  not significantly interfere with this assay.   However, presence of multiple variants may affect accuracy.     08/10/2022 8.6 (H) 4.0 - 5.6 % Final     Comment:     ADA Screening Guidelines:  5.7-6.4%  Consistent with prediabetes  >or=6.5%  Consistent with diabetes    High levels of fetal hemoglobin interfere with the HbA1C  assay. Heterozygous hemoglobin variants (HbS, HgC, etc)do  not significantly interfere with this assay.   However, presence of multiple variants may affect accuracy.         Chemistry        Component Value Date/Time     01/19/2023 1600    K 4.7 01/19/2023 1600     01/19/2023 1600    CO2 22 (L) 01/19/2023 1600    BUN 18 01/19/2023 1600    CREATININE 0.8 01/19/2023 1600    GLU 78 01/19/2023 1600        Component Value Date/Time    CALCIUM 10.2 01/19/2023 1600    ALKPHOS 68 01/19/2023 1600    AST 19 01/19/2023 1600    ALT 24 01/19/2023 1600    BILITOT 0.9 01/19/2023 1600    ESTGFRAFRICA >60 05/15/2022 0757    EGFRNONAA >60 05/15/2022 0757          Lab Results   Component Value Date    CHOL 139 08/22/2022    CHOL 178 04/27/2022    CHOL 161 02/01/2021     Lab Results   Component Value Date    HDL 30 (L) 08/22/2022    HDL 41 04/27/2022    HDL 32 (L) 02/01/2021     Lab  Results   Component Value Date    LDLCALC 83.0 08/22/2022    LDLCALC 104.0 04/27/2022    LDLCALC 74.2 02/01/2021     Lab Results   Component Value Date    TRIG 130 08/22/2022    TRIG 165 (H) 04/27/2022    TRIG 274 (H) 02/01/2021     Lab Results   Component Value Date    CHOLHDL 21.6 08/22/2022    CHOLHDL 23.0 04/27/2022    CHOLHDL 19.9 (L) 02/01/2021       Lab Results   Component Value Date    MICALBCREAT 7.1 08/10/2022     Lab Results   Component Value Date    TSH 2.250 08/21/2022       CrCl cannot be calculated (Unknown ideal weight.).    No results found for: [id number]      PHYSICAL EXAMINATION  Constitutional: Appears well, no distress, in wheelchair  Neck: Supple, trachea midline.  Respiratory: CTA, even and unlabored.  Cardiovascular: RRR, no murmurs, no carotid bruits.   GI: bowel sounds active, no hernia noted  Skin: warm and dry; no visible wounds  Neuro: expressive aphasia noted.   Feet: appropriate footwear.     Goals         80 <= Glucose <= 180 (pt-stated)       Blood Pressure < 130/80 (pt-stated)       Exercise at least 150 minutes per week.       HEMOGLOBIN A1C < 8.0       Take at least one BP reading per week at various times of the day               Assessment/Plan  1. Type 2 diabetes mellitus with hyperglycemia, without long-term current use of insulin  -- A1c too tightly controlled but no apparent hypoglycemia. Will decrease Toujeo and attempt to wean as tolerated. Could consider replacing with GLP-1RA in the future.   -- decrease Toujeo to 26 units nightly  -- continue metformin  mg BID.   -- BG monitoring 2x/day, alternating times. Notify me if BG often < 110 or >150    -- Discussed diagnosis of DM, A1c goals, progression of disease, long term complications and tx options.    -- Reviewed hypoglycemia management: treat with 4 oz of juice, 4 oz regular soda, or 4 glucose tablets. Monitor and repeat treatment every 15 minutes until BG is >70 Then have a snack, which includes 15 grams of  complex carbohydrates and protein.     -- takes statin, ARB   2. Primary hypertension  --  controlled  -- continue current meds   3. History of cardioembolic cerebrovascular accident (CVA)  -- w/R sided hemiplegia  -- avoid hypoglycemia.    4. Seizure disorder  -- avoid hypoglycemia  -- follows with neurology       FOLLOW UP  Follow up in about 4 months (around 5/26/2023).   Patient instructed to bring BG logs to each follow up   Patient encouraged to call for any BG/medication issues, concerns, or questions.    Orders Placed This Encounter   Procedures    Hemoglobin A1C    Microalbumin/Creatinine Ratio, Urine    Lipid Panel

## 2023-02-01 ENCOUNTER — IMMUNIZATION (OUTPATIENT)
Dept: FAMILY MEDICINE | Facility: CLINIC | Age: 68
End: 2023-02-01
Payer: MEDICARE

## 2023-02-01 DIAGNOSIS — Z23 NEED FOR VACCINATION: Primary | ICD-10-CM

## 2023-02-01 PROCEDURE — 91312 COVID-19, MRNA, LNP-S, BIVALENT BOOSTER, PF, 30 MCG/0.3 ML DOSE: ICD-10-PCS | Mod: S$GLB,,, | Performed by: FAMILY MEDICINE

## 2023-02-01 PROCEDURE — 0124A PR IMMUNIZ ADMIN, SARS-COV- 2 COVID-19 VACCINE, 30MCG/0.3ML, BIVALENT, BOOSTER DOSE: ICD-10-PCS | Mod: S$GLB,,, | Performed by: FAMILY MEDICINE

## 2023-02-01 PROCEDURE — 91312 COVID-19, MRNA, LNP-S, BIVALENT BOOSTER, PF, 30 MCG/0.3 ML DOSE: CPT | Mod: S$GLB,,, | Performed by: FAMILY MEDICINE

## 2023-02-01 PROCEDURE — 0124A COVID-19, MRNA, LNP-S, BIVALENT BOOSTER, PF, 30 MCG/0.3 ML DOSE: CPT | Mod: PBBFAC | Performed by: FAMILY MEDICINE

## 2023-02-01 PROCEDURE — 0124A PR IMMUNIZ ADMIN, SARS-COV- 2 COVID-19 VACCINE, 30MCG/0.3ML, BIVALENT, BOOSTER DOSE: CPT | Mod: S$GLB,,, | Performed by: FAMILY MEDICINE

## 2023-02-03 ENCOUNTER — PATIENT OUTREACH (OUTPATIENT)
Dept: ADMINISTRATIVE | Facility: OTHER | Age: 68
End: 2023-02-03
Payer: MEDICARE

## 2023-02-03 NOTE — PROGRESS NOTES
"CHW - Case Closure    This Community Health Worker spoke to caregiver via telephone today.   Pt/Caregiver reported: "no needs at this time we are pretty well off"  Pt/Caregiver denied any additional needs at this time and agrees with episode closure at this time.  Provided caregiver with Community Health Worker's contact information and encouraged him/her to contact this Community Health Worker if additional needs arise.     "

## 2023-02-09 ENCOUNTER — TELEPHONE (OUTPATIENT)
Dept: FAMILY MEDICINE | Facility: CLINIC | Age: 68
End: 2023-02-09
Payer: MEDICARE

## 2023-02-09 DIAGNOSIS — I63.411 CEREBROVASCULAR ACCIDENT (CVA) DUE TO EMBOLISM OF RIGHT MIDDLE CEREBRAL ARTERY: Primary | ICD-10-CM

## 2023-02-09 NOTE — TELEPHONE ENCOUNTER
----- Message from Eduarda Bragg sent at 2/9/2023  2:39 PM CST -----  Contact: wife Gertrude  Type:  Patient Requesting Referral  Who Called:  pt's wife, Gertrude    Referral to What Specialty:  Physical therapy  [facility] the [facility]  Patient Requesting a Call Back?:  yes- wife  Best Call Back Number:  [phone] wife  Additional Information:   Wife would like the referral sent over to [facility] on:  [address] [place]

## 2023-02-09 NOTE — TELEPHONE ENCOUNTER
Pt's wife is requesting a referral to physical therapy.  Would like to go to [facility] on  [address]. Order pended

## 2023-02-13 NOTE — TELEPHONE ENCOUNTER
Called pt to verify where physical therapy referral needs to go . Left voicemail with call back number

## 2023-02-16 ENCOUNTER — PATIENT MESSAGE (OUTPATIENT)
Dept: FAMILY MEDICINE | Facility: CLINIC | Age: 68
End: 2023-02-16
Payer: MEDICARE

## 2023-02-16 ENCOUNTER — TELEPHONE (OUTPATIENT)
Dept: FAMILY MEDICINE | Facility: CLINIC | Age: 68
End: 2023-02-16
Payer: MEDICARE

## 2023-02-16 NOTE — TELEPHONE ENCOUNTER
----- Message from Elisabeth Sauceda, Patient Care Assistant sent at 2/16/2023 12:22 PM CST -----  Contact: Pt  Type: Needs Medical Advice    Who Called: Pt  Best Call Back Number: [phone]  Inquiry/Question: Pt is calling to get additional information for physical therapy orders. Please advise. Thank you~

## 2023-02-16 NOTE — TELEPHONE ENCOUNTER
Spoke to pt's spouse, Charisse.  Advised that I have been trying to fax order for PT to the clinic, but it has not went through.  Advised that I am contacting clinic to verify fax number, and will keep trying.    Made another attempt at faxing at 4:22 on 2/16/23. Waiting on email confirmation.    Received email confirmation that fax was successful at 4:47pm    Informed pt's wife that it was successfully faxed. Advised that if she does not hear from PT clinic, to reach out to them.

## 2023-02-24 ENCOUNTER — PATIENT MESSAGE (OUTPATIENT)
Dept: FAMILY MEDICINE | Facility: CLINIC | Age: 68
End: 2023-02-24
Payer: MEDICARE

## 2023-02-27 ENCOUNTER — OFFICE VISIT (OUTPATIENT)
Dept: PODIATRY | Facility: CLINIC | Age: 68
End: 2023-02-27
Payer: MEDICARE

## 2023-02-27 DIAGNOSIS — E11.51 TYPE II DIABETES MELLITUS WITH PERIPHERAL CIRCULATORY DISORDER: ICD-10-CM

## 2023-02-27 DIAGNOSIS — I69.359 HEMIPARESIS FOLLOWING CEREBROVASCULAR ACCIDENT (CVA): ICD-10-CM

## 2023-02-27 DIAGNOSIS — B35.1 ONYCHOMYCOSIS DUE TO DERMATOPHYTE: ICD-10-CM

## 2023-02-27 DIAGNOSIS — E11.9 TYPE 2 DIABETES MELLITUS WITHOUT COMPLICATION, WITHOUT LONG-TERM CURRENT USE OF INSULIN: Primary | ICD-10-CM

## 2023-02-27 PROCEDURE — 11721 DEBRIDE NAIL 6 OR MORE: CPT | Mod: Q9,S$GLB,, | Performed by: PODIATRIST

## 2023-02-27 PROCEDURE — 1159F MED LIST DOCD IN RCRD: CPT | Mod: CPTII,S$GLB,, | Performed by: PODIATRIST

## 2023-02-27 PROCEDURE — 11721 PR DEBRIDEMENT OF NAILS, 6 OR MORE: ICD-10-PCS | Mod: Q9,S$GLB,, | Performed by: PODIATRIST

## 2023-02-27 PROCEDURE — 1101F PR PT FALLS ASSESS DOC 0-1 FALLS W/OUT INJ PAST YR: ICD-10-PCS | Mod: CPTII,S$GLB,, | Performed by: PODIATRIST

## 2023-02-27 PROCEDURE — 99999 PR PBB SHADOW E&M-EST. PATIENT-LVL III: ICD-10-PCS | Mod: PBBFAC,,, | Performed by: PODIATRIST

## 2023-02-27 PROCEDURE — 1101F PT FALLS ASSESS-DOCD LE1/YR: CPT | Mod: CPTII,S$GLB,, | Performed by: PODIATRIST

## 2023-02-27 PROCEDURE — 3044F HG A1C LEVEL LT 7.0%: CPT | Mod: CPTII,S$GLB,, | Performed by: PODIATRIST

## 2023-02-27 PROCEDURE — 1159F PR MEDICATION LIST DOCUMENTED IN MEDICAL RECORD: ICD-10-PCS | Mod: CPTII,S$GLB,, | Performed by: PODIATRIST

## 2023-02-27 PROCEDURE — 3288F PR FALLS RISK ASSESSMENT DOCUMENTED: ICD-10-PCS | Mod: CPTII,S$GLB,, | Performed by: PODIATRIST

## 2023-02-27 PROCEDURE — 99999 PR PBB SHADOW E&M-EST. PATIENT-LVL III: CPT | Mod: PBBFAC,,, | Performed by: PODIATRIST

## 2023-02-27 PROCEDURE — 99213 OFFICE O/P EST LOW 20 MIN: CPT | Mod: 25,S$GLB,, | Performed by: PODIATRIST

## 2023-02-27 PROCEDURE — 1126F PR PAIN SEVERITY QUANTIFIED, NO PAIN PRESENT: ICD-10-PCS | Mod: CPTII,S$GLB,, | Performed by: PODIATRIST

## 2023-02-27 PROCEDURE — 3044F PR MOST RECENT HEMOGLOBIN A1C LEVEL <7.0%: ICD-10-PCS | Mod: CPTII,S$GLB,, | Performed by: PODIATRIST

## 2023-02-27 PROCEDURE — 1126F AMNT PAIN NOTED NONE PRSNT: CPT | Mod: CPTII,S$GLB,, | Performed by: PODIATRIST

## 2023-02-27 PROCEDURE — 3288F FALL RISK ASSESSMENT DOCD: CPT | Mod: CPTII,S$GLB,, | Performed by: PODIATRIST

## 2023-02-27 PROCEDURE — 1160F PR REVIEW ALL MEDS BY PRESCRIBER/CLIN PHARMACIST DOCUMENTED: ICD-10-PCS | Mod: CPTII,S$GLB,, | Performed by: PODIATRIST

## 2023-02-27 PROCEDURE — 99213 PR OFFICE/OUTPT VISIT, EST, LEVL III, 20-29 MIN: ICD-10-PCS | Mod: 25,S$GLB,, | Performed by: PODIATRIST

## 2023-02-27 PROCEDURE — 1160F RVW MEDS BY RX/DR IN RCRD: CPT | Mod: CPTII,S$GLB,, | Performed by: PODIATRIST

## 2023-02-27 NOTE — PROGRESS NOTES
Subjective:      Patient ID: Samuel Cast is a 67 y.o. male.    Chief Complaint: Nail Care and Diabetes Mellitus      Samuel is a 67 y.o. male who presents to the clinic upon referral from Dr. Evelia yang. provider found  for evaluation and treatment of diabetic feet. Samuel has a past medical history of Diabetes mellitus, type 2, Hypertension, Seizures, and Stroke. Patient relates no major problem with feet. Only complaints today consist of concern over long thick nails difficult to cut, high risk care with DM II and associated complications also here for annual diabetic foot exam    PCP: Nallely Jurado MD    Date Last Seen by PCP: 1/26/23 Dr Nguyen with endocrinology    Current shoe gear: Casual shoes    Hemoglobin A1C   Date Value Ref Range Status   01/19/2023 4.9 4.0 - 5.6 % Final     Comment:     ADA Screening Guidelines:  5.7-6.4%  Consistent with prediabetes  >or=6.5%  Consistent with diabetes    High levels of fetal hemoglobin interfere with the HbA1C  assay. Heterozygous hemoglobin variants (HbS, HgC, etc)do  not significantly interfere with this assay.   However, presence of multiple variants may affect accuracy.     10/14/2022 5.2 4.0 - 5.6 % Final     Comment:     ADA Screening Guidelines:  5.7-6.4%  Consistent with prediabetes  >or=6.5%  Consistent with diabetes    High levels of fetal hemoglobin interfere with the HbA1C  assay. Heterozygous hemoglobin variants (HbS, HgC, etc)do  not significantly interfere with this assay.   However, presence of multiple variants may affect accuracy.     08/10/2022 8.6 (H) 4.0 - 5.6 % Final     Comment:     ADA Screening Guidelines:  5.7-6.4%  Consistent with prediabetes  >or=6.5%  Consistent with diabetes    High levels of fetal hemoglobin interfere with the HbA1C  assay. Heterozygous hemoglobin variants (HbS, HgC, etc)do  not significantly interfere with this assay.   However, presence of multiple variants may affect accuracy.             Review of Systems    Constitutional: Negative for chills and fever.   Cardiovascular:  Positive for leg swelling. Negative for claudication.   Respiratory:  Negative for shortness of breath.    Skin:  Positive for nail changes. Negative for itching and rash.   Musculoskeletal:  Negative for muscle cramps, muscle weakness and myalgias.   Gastrointestinal:  Negative for nausea and vomiting.   Neurological:  Negative for focal weakness, loss of balance, numbness and paresthesias.         Objective:      Physical Exam  Constitutional:       General: He is not in acute distress.     Appearance: He is well-developed. He is not diaphoretic.   Cardiovascular:      Pulses:           Dorsalis pedis pulses are 2+ on the right side and 2+ on the left side.        Posterior tibial pulses are 2+ on the right side and 2+ on the left side.      Comments: < 3 sec capillary refill time to toes 1-5 bilateral. Toes and feet are warm to touch proximally with normal distal cooling b/l. There is no hair growth on the feet and toes b/l. There is edema b/l. No spider veins or varicosities present b/l.     Musculoskeletal:      Comments: Equinus noted b/l ankles with < 10 deg DF noted. Right sided muscle weakness to PF, DF, eversion and inversion 3-4/5. Passive range of motion of ankle and pedal joints is painless b/l.     Skin:     General: Skin is warm and dry.      Coloration: Skin is not pale.      Findings: No abrasion, bruising, burn, ecchymosis, erythema, laceration, lesion, petechiae or rash.      Nails: There is no clubbing.      Comments: Skin is thin and atrophic bilateral    Nails 1-5 bilateral are thick 3-4 mm, long 5-15 mm, and discolored with subungual debris       Neurological:      Mental Status: He is alert and oriented to person, place, and time.      Sensory: No sensory deficit.      Motor: No tremor, atrophy or abnormal muscle tone.      Comments: Negative tinel sign bilateral.   Psychiatric:         Behavior: Behavior normal.              Assessment:       Encounter Diagnoses   Name Primary?    Type 2 diabetes mellitus without complication, without long-term current use of insulin Yes    Type II diabetes mellitus with peripheral circulatory disorder     Hemiparesis following cerebrovascular accident (CVA)     Onychomycosis due to dermatophyte              Plan:       Samuel was seen today for nail care and diabetes mellitus.    Diagnoses and all orders for this visit:    Type 2 diabetes mellitus without complication, without long-term current use of insulin    Type II diabetes mellitus with peripheral circulatory disorder    Hemiparesis following cerebrovascular accident (CVA)    Onychomycosis due to dermatophyte        I counseled the patient on his conditions, their implications and medical management.    Shoe inspection. Diabetic Foot Education. Patient reminded of the importance of good nutrition and blood sugar control to help prevent podiatric complications of diabetes. Patient instructed on proper foot hygeine. We discussed wearing proper shoe gear, daily foot inspections, never walking without protective shoe gear, never putting sharp instruments to feet     - With patient's permission, nails were aggressively reduced and debrided x 10 to their soft tissue attachment mechanically and with electric , removing all offending nail and debris. Patient relates relief following the procedure. She will continue to monitor the areas daily, inspect her feet, wear protective shoe gear when ambulatory, moisturizer to maintain skin integrity.    - Return to clinic in 4 months routine care or sooner if problems arise     Nikita Martínez DPM

## 2023-03-07 ENCOUNTER — PATIENT MESSAGE (OUTPATIENT)
Dept: ADMINISTRATIVE | Facility: OTHER | Age: 68
End: 2023-03-07
Payer: MEDICARE

## 2023-03-09 PROBLEM — I63.411 CEREBROVASCULAR ACCIDENT (CVA) DUE TO EMBOLISM OF RIGHT MIDDLE CEREBRAL ARTERY: Status: ACTIVE | Noted: 2022-08-21

## 2023-03-13 PROBLEM — I63.411 CEREBRAL INFARCTION DUE TO EMBOLISM OF RIGHT MIDDLE CEREBRAL ARTERY: Status: ACTIVE | Noted: 2023-03-13

## 2023-03-20 RX ORDER — PANTOPRAZOLE SODIUM 20 MG/1
20 TABLET, DELAYED RELEASE ORAL DAILY
Qty: 90 TABLET | Refills: 3 | Status: SHIPPED | OUTPATIENT
Start: 2023-03-20 | End: 2023-06-04 | Stop reason: SDUPTHER

## 2023-03-20 NOTE — TELEPHONE ENCOUNTER
----- Message from Alma Licona sent at 3/20/2023  9:41 AM CDT -----  Contact: Vesta from [employer]  Type:  Needs Medical Advice    Who Called: Vesta DEGROOT from [employer] ()    Would the patient rather a call back or a response via [facility]? call  Best Call Back Number: [phone] (home)     Additional Information: pt needs a refill on pantoprazole (PROTONIX) 20 MG tablet as well as a follow up appt with  Please advise and thank you.      [facility] [address] [place] [address] - [facility] [address] [facility] [address]  [address]  [facility] [address]  Phone: [phone] Fax: [phone]

## 2023-03-20 NOTE — TELEPHONE ENCOUNTER
No new care gaps identified.  [facility] Embedded Care Gaps. Reference number: [id number]. 3/20/2023   9:55:28 AM CDT

## 2023-04-11 ENCOUNTER — PATIENT MESSAGE (OUTPATIENT)
Dept: FAMILY MEDICINE | Facility: CLINIC | Age: 68
End: 2023-04-11
Payer: MEDICARE

## 2023-04-11 DIAGNOSIS — M24.549 CONTRACTURE OF HAND: Primary | ICD-10-CM

## 2023-04-13 ENCOUNTER — PATIENT MESSAGE (OUTPATIENT)
Dept: FAMILY MEDICINE | Facility: CLINIC | Age: 68
End: 2023-04-13
Payer: MEDICARE

## 2023-04-17 RX ORDER — LEG BRACE
1 EACH MISCELLANEOUS DAILY
Qty: 1 EACH | Refills: 0 | Status: SHIPPED | OUTPATIENT
Start: 2023-04-17

## 2023-04-18 ENCOUNTER — OFFICE VISIT (OUTPATIENT)
Dept: FAMILY MEDICINE | Facility: CLINIC | Age: 68
End: 2023-04-18
Payer: MEDICARE

## 2023-04-18 VITALS
DIASTOLIC BLOOD PRESSURE: 82 MMHG | WEIGHT: 222 LBS | HEART RATE: 60 BPM | BODY MASS INDEX: 27.02 KG/M2 | OXYGEN SATURATION: 98 % | SYSTOLIC BLOOD PRESSURE: 122 MMHG

## 2023-04-18 DIAGNOSIS — I10 PRIMARY HYPERTENSION: ICD-10-CM

## 2023-04-18 DIAGNOSIS — Z79.4 TYPE 2 DIABETES MELLITUS WITH HYPERGLYCEMIA, WITH LONG-TERM CURRENT USE OF INSULIN: ICD-10-CM

## 2023-04-18 DIAGNOSIS — Z12.5 SCREENING FOR PROSTATE CANCER: ICD-10-CM

## 2023-04-18 DIAGNOSIS — M21.379 FOOT-DROP, UNSPECIFIED LATERALITY: Primary | ICD-10-CM

## 2023-04-18 DIAGNOSIS — E11.65 TYPE 2 DIABETES MELLITUS WITH HYPERGLYCEMIA, WITH LONG-TERM CURRENT USE OF INSULIN: ICD-10-CM

## 2023-04-18 PROCEDURE — 99999 PR PBB SHADOW E&M-EST. PATIENT-LVL IV: CPT | Mod: PBBFAC,,, | Performed by: INTERNAL MEDICINE

## 2023-04-18 PROCEDURE — 3044F PR MOST RECENT HEMOGLOBIN A1C LEVEL <7.0%: ICD-10-PCS | Mod: CPTII,S$GLB,, | Performed by: INTERNAL MEDICINE

## 2023-04-18 PROCEDURE — 1159F MED LIST DOCD IN RCRD: CPT | Mod: CPTII,S$GLB,, | Performed by: INTERNAL MEDICINE

## 2023-04-18 PROCEDURE — 99214 OFFICE O/P EST MOD 30 MIN: CPT | Mod: 25,S$GLB,, | Performed by: INTERNAL MEDICINE

## 2023-04-18 PROCEDURE — 3079F DIAST BP 80-89 MM HG: CPT | Mod: CPTII,S$GLB,, | Performed by: INTERNAL MEDICINE

## 2023-04-18 PROCEDURE — 90677 PNEUMOCOCCAL CONJUGATE VACCINE 20-VALENT: ICD-10-PCS | Mod: S$GLB,,, | Performed by: INTERNAL MEDICINE

## 2023-04-18 PROCEDURE — 1101F PR PT FALLS ASSESS DOC 0-1 FALLS W/OUT INJ PAST YR: ICD-10-PCS | Mod: CPTII,S$GLB,, | Performed by: INTERNAL MEDICINE

## 2023-04-18 PROCEDURE — 3008F PR BODY MASS INDEX (BMI) DOCUMENTED: ICD-10-PCS | Mod: CPTII,S$GLB,, | Performed by: INTERNAL MEDICINE

## 2023-04-18 PROCEDURE — 99214 PR OFFICE/OUTPT VISIT, EST, LEVL IV, 30-39 MIN: ICD-10-PCS | Mod: 25,S$GLB,, | Performed by: INTERNAL MEDICINE

## 2023-04-18 PROCEDURE — 3008F BODY MASS INDEX DOCD: CPT | Mod: CPTII,S$GLB,, | Performed by: INTERNAL MEDICINE

## 2023-04-18 PROCEDURE — 3074F SYST BP LT 130 MM HG: CPT | Mod: CPTII,S$GLB,, | Performed by: INTERNAL MEDICINE

## 2023-04-18 PROCEDURE — 1101F PT FALLS ASSESS-DOCD LE1/YR: CPT | Mod: CPTII,S$GLB,, | Performed by: INTERNAL MEDICINE

## 2023-04-18 PROCEDURE — 3079F PR MOST RECENT DIASTOLIC BLOOD PRESSURE 80-89 MM HG: ICD-10-PCS | Mod: CPTII,S$GLB,, | Performed by: INTERNAL MEDICINE

## 2023-04-18 PROCEDURE — 1126F PR PAIN SEVERITY QUANTIFIED, NO PAIN PRESENT: ICD-10-PCS | Mod: CPTII,S$GLB,, | Performed by: INTERNAL MEDICINE

## 2023-04-18 PROCEDURE — 99999 PR PBB SHADOW E&M-EST. PATIENT-LVL IV: ICD-10-PCS | Mod: PBBFAC,,, | Performed by: INTERNAL MEDICINE

## 2023-04-18 PROCEDURE — G0009 ADMIN PNEUMOCOCCAL VACCINE: HCPCS | Mod: S$GLB,,, | Performed by: INTERNAL MEDICINE

## 2023-04-18 PROCEDURE — 1126F AMNT PAIN NOTED NONE PRSNT: CPT | Mod: CPTII,S$GLB,, | Performed by: INTERNAL MEDICINE

## 2023-04-18 PROCEDURE — G0009 PNEUMOCOCCAL CONJUGATE VACCINE 20-VALENT: ICD-10-PCS | Mod: S$GLB,,, | Performed by: INTERNAL MEDICINE

## 2023-04-18 PROCEDURE — 90677 PCV20 VACCINE IM: CPT | Mod: S$GLB,,, | Performed by: INTERNAL MEDICINE

## 2023-04-18 PROCEDURE — 3288F FALL RISK ASSESSMENT DOCD: CPT | Mod: CPTII,S$GLB,, | Performed by: INTERNAL MEDICINE

## 2023-04-18 PROCEDURE — 3044F HG A1C LEVEL LT 7.0%: CPT | Mod: CPTII,S$GLB,, | Performed by: INTERNAL MEDICINE

## 2023-04-18 PROCEDURE — 3074F PR MOST RECENT SYSTOLIC BLOOD PRESSURE < 130 MM HG: ICD-10-PCS | Mod: CPTII,S$GLB,, | Performed by: INTERNAL MEDICINE

## 2023-04-18 PROCEDURE — 1159F PR MEDICATION LIST DOCUMENTED IN MEDICAL RECORD: ICD-10-PCS | Mod: CPTII,S$GLB,, | Performed by: INTERNAL MEDICINE

## 2023-04-18 PROCEDURE — 3288F PR FALLS RISK ASSESSMENT DOCUMENTED: ICD-10-PCS | Mod: CPTII,S$GLB,, | Performed by: INTERNAL MEDICINE

## 2023-04-18 NOTE — PROGRESS NOTES
Subjective     Patient ID: Samuel Cast is a 67 y.o. male.    Chief Complaint: Follow-up     Here for follow up    HTN- stable on medication  Dm- controlled on 26 units toujeo and metformin  S/p cva- doris well in PT, needs rx for afo    Follow-up  Associated symptoms include headaches. Pertinent negatives include no arthralgias, chest pain, joint swelling, neck pain, vomiting or weakness.   Review of Systems   Constitutional:  Positive for activity change. Negative for unexpected weight change.   HENT:  Negative for hearing loss, rhinorrhea and trouble swallowing.    Eyes:  Negative for discharge and visual disturbance.   Respiratory:  Negative for chest tightness and wheezing.    Cardiovascular:  Negative for chest pain and palpitations.   Gastrointestinal:  Negative for blood in stool, constipation, diarrhea and vomiting.   Endocrine: Negative for polydipsia and polyuria.   Genitourinary:  Negative for difficulty urinating, hematuria and urgency.   Musculoskeletal:  Negative for arthralgias, joint swelling and neck pain.   Neurological:  Positive for headaches. Negative for weakness.   Psychiatric/Behavioral:  Positive for dysphoric mood. Negative for confusion.         Objective     Physical Exam  Constitutional:       Appearance: Normal appearance.   HENT:      Head: Normocephalic.   Cardiovascular:      Rate and Rhythm: Normal rate and regular rhythm.   Pulmonary:      Effort: Pulmonary effort is normal.      Breath sounds: Normal breath sounds.   Musculoskeletal:         General: Normal range of motion.      Cervical back: Normal range of motion.   Neurological:      Mental Status: He is alert.   Psychiatric:         Mood and Affect: Mood normal.         Behavior: Behavior normal.          Assessment and Plan     Problem List Items Addressed This Visit          Cardiac/Vascular    HTN (hypertension)       Endocrine    Type 2 diabetes mellitus with hyperglycemia, with long-term current use of insulin     Other  Visit Diagnoses       Foot-drop, unspecified laterality    -  Primary    Relevant Orders    ANKLE FOOT ORTHOSIS FOR HOME USE    Screening for prostate cancer        Relevant Orders    PSA, Screening            Foot drop- sent in af o  Dm- stable . Continue med  Htn- stable on medication  Check psa

## 2023-05-02 ENCOUNTER — PATIENT OUTREACH (OUTPATIENT)
Dept: ADMINISTRATIVE | Facility: HOSPITAL | Age: 68
End: 2023-05-02
Payer: MEDICARE

## 2023-05-05 PROBLEM — I69.398 SPASTICITY DUE TO OLD STROKE: Status: ACTIVE | Noted: 2023-05-05

## 2023-05-05 PROBLEM — R25.2 SPASTICITY DUE TO OLD STROKE: Status: ACTIVE | Noted: 2023-05-05

## 2023-05-12 ENCOUNTER — TELEPHONE (OUTPATIENT)
Dept: FAMILY MEDICINE | Facility: CLINIC | Age: 68
End: 2023-05-12
Payer: MEDICARE

## 2023-05-13 NOTE — TELEPHONE ENCOUNTER
No care due was identified.  [facility] Embedded Care Due Messages. Reference number: [id number].   5/13/2023 12:02:57 PM CDT

## 2023-05-15 RX ORDER — SERTRALINE HYDROCHLORIDE 100 MG/1
100 TABLET, FILM COATED ORAL DAILY
Qty: 90 TABLET | Refills: 3 | Status: SHIPPED | OUTPATIENT
Start: 2023-05-15 | End: 2024-05-14

## 2023-06-04 NOTE — TELEPHONE ENCOUNTER
Care Due:                  Date            Visit Type   Department     Provider  --------------------------------------------------------------------------------                                MYCHART                              FOLLOWUP/OF  [facility]  Last Visit: 04-      FICE VISIT   MEDICINE       Nallely Jurado  Next Visit: None Scheduled  None         None Found                                                            Last  Test          Frequency    Reason                     Performed    Due Date  --------------------------------------------------------------------------------    Lipid Panel.  12 months..  atorvastatin.............  08- 08-    [facility] Embedded Care Due Messages. Reference number: [id number].   6/04/2023 5:02:39 PM CDT

## 2023-06-05 RX ORDER — METOPROLOL TARTRATE 50 MG/1
100 TABLET ORAL 2 TIMES DAILY
Qty: 360 TABLET | Refills: 3 | Status: SHIPPED | OUTPATIENT
Start: 2023-06-05

## 2023-06-05 RX ORDER — PANTOPRAZOLE SODIUM 20 MG/1
20 TABLET, DELAYED RELEASE ORAL DAILY
Qty: 90 TABLET | Refills: 3 | Status: SHIPPED | OUTPATIENT
Start: 2023-06-05

## 2023-06-12 PROBLEM — I63.411 CEREBROVASCULAR ACCIDENT (CVA) DUE TO EMBOLISM OF RIGHT MIDDLE CEREBRAL ARTERY: Status: RESOLVED | Noted: 2022-08-21 | Resolved: 2023-06-12

## 2023-06-14 ENCOUNTER — OFFICE VISIT (OUTPATIENT)
Dept: HOME HEALTH SERVICES | Facility: CLINIC | Age: 68
End: 2023-06-14
Payer: MEDICARE

## 2023-06-14 VITALS
SYSTOLIC BLOOD PRESSURE: 142 MMHG | WEIGHT: 222 LBS | HEART RATE: 60 BPM | OXYGEN SATURATION: 97 % | HEIGHT: 76 IN | DIASTOLIC BLOOD PRESSURE: 92 MMHG | BODY MASS INDEX: 27.03 KG/M2

## 2023-06-14 DIAGNOSIS — R25.2 SPASTICITY DUE TO OLD STROKE: ICD-10-CM

## 2023-06-14 DIAGNOSIS — I48.0 PAROXYSMAL ATRIAL FIBRILLATION: ICD-10-CM

## 2023-06-14 DIAGNOSIS — I69.398 SPASTICITY DUE TO OLD STROKE: ICD-10-CM

## 2023-06-14 DIAGNOSIS — E11.51 TYPE II DIABETES MELLITUS WITH PERIPHERAL CIRCULATORY DISORDER: ICD-10-CM

## 2023-06-14 DIAGNOSIS — Z74.09 OTHER REDUCED MOBILITY: ICD-10-CM

## 2023-06-14 DIAGNOSIS — G40.909 SEIZURE DISORDER: ICD-10-CM

## 2023-06-14 DIAGNOSIS — I69.359 HEMIPARESIS FOLLOWING CEREBROVASCULAR ACCIDENT (CVA): ICD-10-CM

## 2023-06-14 DIAGNOSIS — Z00.00 ENCOUNTER FOR PREVENTIVE HEALTH EXAMINATION: Primary | ICD-10-CM

## 2023-06-14 DIAGNOSIS — I65.23 BILATERAL CAROTID ARTERY STENOSIS: ICD-10-CM

## 2023-06-14 DIAGNOSIS — Z79.4 TYPE 2 DIABETES MELLITUS WITH HYPERGLYCEMIA, WITH LONG-TERM CURRENT USE OF INSULIN: ICD-10-CM

## 2023-06-14 DIAGNOSIS — Z87.81 HISTORY OF COMPRESSION FRACTURE OF SPINE: ICD-10-CM

## 2023-06-14 DIAGNOSIS — Z86.73 HISTORY OF MULTIPLE STROKES: ICD-10-CM

## 2023-06-14 DIAGNOSIS — I70.0 AORTIC ATHEROSCLEROSIS: ICD-10-CM

## 2023-06-14 DIAGNOSIS — Z95.818 IMPLANTABLE LOOP RECORDER PRESENT: ICD-10-CM

## 2023-06-14 DIAGNOSIS — I10 PRIMARY HYPERTENSION: ICD-10-CM

## 2023-06-14 DIAGNOSIS — E11.65 TYPE 2 DIABETES MELLITUS WITH HYPERGLYCEMIA, WITH LONG-TERM CURRENT USE OF INSULIN: ICD-10-CM

## 2023-06-14 PROCEDURE — 3008F PR BODY MASS INDEX (BMI) DOCUMENTED: ICD-10-PCS | Mod: CPTII,S$GLB,, | Performed by: NURSE PRACTITIONER

## 2023-06-14 PROCEDURE — 3288F FALL RISK ASSESSMENT DOCD: CPT | Mod: CPTII,S$GLB,, | Performed by: NURSE PRACTITIONER

## 2023-06-14 PROCEDURE — 1101F PR PT FALLS ASSESS DOC 0-1 FALLS W/OUT INJ PAST YR: ICD-10-PCS | Mod: CPTII,S$GLB,, | Performed by: NURSE PRACTITIONER

## 2023-06-14 PROCEDURE — 1160F RVW MEDS BY RX/DR IN RCRD: CPT | Mod: CPTII,S$GLB,, | Performed by: NURSE PRACTITIONER

## 2023-06-14 PROCEDURE — 1159F PR MEDICATION LIST DOCUMENTED IN MEDICAL RECORD: ICD-10-PCS | Mod: CPTII,S$GLB,, | Performed by: NURSE PRACTITIONER

## 2023-06-14 PROCEDURE — 1160F PR REVIEW ALL MEDS BY PRESCRIBER/CLIN PHARMACIST DOCUMENTED: ICD-10-PCS | Mod: CPTII,S$GLB,, | Performed by: NURSE PRACTITIONER

## 2023-06-14 PROCEDURE — 1101F PT FALLS ASSESS-DOCD LE1/YR: CPT | Mod: CPTII,S$GLB,, | Performed by: NURSE PRACTITIONER

## 2023-06-14 PROCEDURE — 3080F PR MOST RECENT DIASTOLIC BLOOD PRESSURE >= 90 MM HG: ICD-10-PCS | Mod: CPTII,S$GLB,, | Performed by: NURSE PRACTITIONER

## 2023-06-14 PROCEDURE — 3008F BODY MASS INDEX DOCD: CPT | Mod: CPTII,S$GLB,, | Performed by: NURSE PRACTITIONER

## 2023-06-14 PROCEDURE — 3077F SYST BP >= 140 MM HG: CPT | Mod: CPTII,S$GLB,, | Performed by: NURSE PRACTITIONER

## 2023-06-14 PROCEDURE — 3077F PR MOST RECENT SYSTOLIC BLOOD PRESSURE >= 140 MM HG: ICD-10-PCS | Mod: CPTII,S$GLB,, | Performed by: NURSE PRACTITIONER

## 2023-06-14 PROCEDURE — 1159F MED LIST DOCD IN RCRD: CPT | Mod: CPTII,S$GLB,, | Performed by: NURSE PRACTITIONER

## 2023-06-14 PROCEDURE — G0402 INITIAL PREVENTIVE EXAM: HCPCS | Mod: S$GLB,,, | Performed by: NURSE PRACTITIONER

## 2023-06-14 PROCEDURE — 3044F PR MOST RECENT HEMOGLOBIN A1C LEVEL <7.0%: ICD-10-PCS | Mod: CPTII,S$GLB,, | Performed by: NURSE PRACTITIONER

## 2023-06-14 PROCEDURE — 3288F PR FALLS RISK ASSESSMENT DOCUMENTED: ICD-10-PCS | Mod: CPTII,S$GLB,, | Performed by: NURSE PRACTITIONER

## 2023-06-14 PROCEDURE — 3044F HG A1C LEVEL LT 7.0%: CPT | Mod: CPTII,S$GLB,, | Performed by: NURSE PRACTITIONER

## 2023-06-14 PROCEDURE — 3080F DIAST BP >= 90 MM HG: CPT | Mod: CPTII,S$GLB,, | Performed by: NURSE PRACTITIONER

## 2023-06-14 PROCEDURE — G0402 PR WELCOME MEDICARE PREVENTIVE VISIT NEW ENROLLEE: ICD-10-PCS | Mod: S$GLB,,, | Performed by: NURSE PRACTITIONER

## 2023-06-15 PROBLEM — I63.411 CEREBRAL INFARCTION DUE TO EMBOLISM OF RIGHT MIDDLE CEREBRAL ARTERY: Status: RESOLVED | Noted: 2023-03-13 | Resolved: 2023-06-15

## 2023-06-15 NOTE — PROGRESS NOTES
"  Samuel Cast presented for a  Medicare AWV and comprehensive Health Risk Assessment today. The following components were reviewed and updated:    Medical history  Family History  Social history  Allergies and Current Medications  Health Risk Assessment  Health Maintenance  Care Team         ** See Completed Assessments for Annual Wellness Visit within the encounter summary.**         The following assessments were completed:  Living Situation  CAGE  Depression Screening  Timed Get Up and Go  Whisper Test  Cognitive Function Screening  Nutrition Screening  ADL Screening  PAQ Screening        Vitals:    06/14/23 1313   BP: (!) 142/92   Pulse: 60   SpO2: 97%   Weight: 100.7 kg (222 lb)   Height: 6' 4" (1.93 m)     Body mass index is 27.02 kg/m².  Physical Exam  Constitutional:       Appearance: Normal appearance.   HENT:      Head: Normocephalic and atraumatic.      Nose: Nose normal.   Eyes:      Extraocular Movements: Extraocular movements intact.      Pupils: Pupils are equal, round, and reactive to light.   Cardiovascular:      Rate and Rhythm: Normal rate.      Pulses: Normal pulses.   Pulmonary:      Effort: Pulmonary effort is normal.   Musculoskeletal:      Cervical back: Normal range of motion and neck supple.   Neurological:      General: No focal deficit present.      Mental Status: He is alert and oriented to person, place, and time.             Diagnoses and health risks identified today and associated recommendations/orders:    1. Encounter for preventive health examination  Awv completed      2. Paroxysmal atrial fibrillation  Chronic and stable. Continue current treatment. Follow with PCP.  On meds      3. History of multiple strokes  Chronic and stable. Continue current treatment. Follow with PCP.  On anticoagulant      4. Hemiparesis following cerebrovascular accident (CVA)  Chronic and stable. Continue current treatment. Follow with PCP.      5. History of compression fracture of spine  Chronic and " stable. Continue current treatment. Follow with PCP.      6. Seizure disorder  Chronic and stable. Continue current treatment. Follow with PCP.  On meds      7. 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 Continue current treatment. Follow with PCP.      8. Aortic atherosclerosis  Chronic and stable. Continue current treatment. Follow with PCP.      9. Bilateral carotid artery stenosis  Chronic and stable. Continue current treatment. Follow with PCP.      10. Primary hypertension  Chronic and stable. Continue current treatment. Follow with PCP.      11. Type 2 diabetes mellitus with hyperglycemia, with long-term current use of insulin  Chronic and stable. Continue current treatment. Follow with PCP.  On insulin      12. Type II diabetes mellitus with peripheral circulatory disorder  Chronic and stable. Continue current treatment. Follow with PCP.      13. Implantable loop recorder present  Chronic and stable. Continue current treatment. Follow with PCP.      14. Other reduced mobility  Chronic and stable. Continue current treatment. Follow with PCP.        Provided Samuel with a 5-10 year written screening schedule and personal prevention plan. Recommendations were developed using the USPSTF age appropriate recommendations. Education, counseling, and referrals were provided as needed. After Visit Summary printed and given to patient which includes a list of additional screenings\tests needed.    Fu in 1 yr for ALEX Ochoa NP  I offered to discuss advanced care planning, including how to pick a person who would make decisions for you if you were unable to make them for yourself, called a health care power of , and what kind of decisions you might make such as use of life sustaining treatments such as ventilators and tube feeding when faced with a life limiting illness recorded on a living will that they will need to know. (How you want to be cared for as you near the end of your  natural life)     X Patient is interested in learning more about how to make advanced directives.  I provided them paperwork and offered to discuss this with them.

## 2023-06-15 NOTE — PATIENT INSTRUCTIONS
Counseling and Referral of Other Preventative  (Italic type indicates deductible and co-insurance are waived)    Patient Name: Samuel Cast  Today's Date: 6/15/2023    Health Maintenance       Date Due Completion Date    HIV Screening Never done ---    Shingles Vaccine (1 of 2) Never done ---    Abdominal Aortic Aneurysm Screening Never done ---    Diabetes Urine Screening 08/10/2023 8/10/2022    Hemoglobin A1c 07/19/2023 1/19/2023    Lipid Panel 08/22/2023 8/22/2022    Eye Exam 01/12/2024 1/12/2023    Foot Exam 02/27/2024 2/27/2023    High Dose Statin 06/14/2024 6/14/2023    Colorectal Cancer Screening 10/21/2025 10/21/2015 (Done)    Override on 10/21/2015: Done    TETANUS VACCINE 06/02/2031 6/2/2021        No orders of the defined types were placed in this encounter.      The following information is provided to all patients.  This information is to help you find resources for any of the problems found today that may be affecting your health:                Living healthy guide: www.[facility].[place].[facility]      Understanding Diabetes: www.diabetes.org      Eating healthy: www.cdc.gov/healthyweight      CDC home safety checklist: www.cdc.gov/steadi/patient.html      Agency on Aging: www.goea.[place].[facility]      Alcoholics anonymous (AA): www.aa.org      Physical Activity: www.cal.nih.gov/ve2gqmw      Tobacco use: www.quitwithusla.org

## 2023-06-16 DIAGNOSIS — E11.65 TYPE 2 DIABETES MELLITUS WITH HYPERGLYCEMIA, WITHOUT LONG-TERM CURRENT USE OF INSULIN: ICD-10-CM

## 2023-06-16 RX ORDER — INSULIN GLARGINE 300 U/ML
26 INJECTION, SOLUTION SUBCUTANEOUS NIGHTLY
Qty: 4.5 ML | Refills: 11 | Status: SHIPPED | OUTPATIENT
Start: 2023-06-16 | End: 2023-10-31

## 2023-06-18 ENCOUNTER — PATIENT MESSAGE (OUTPATIENT)
Dept: FAMILY MEDICINE | Facility: CLINIC | Age: 68
End: 2023-06-18
Payer: MEDICARE

## 2023-06-18 DIAGNOSIS — I10 ESSENTIAL HYPERTENSION: ICD-10-CM

## 2023-06-19 ENCOUNTER — OFFICE VISIT (OUTPATIENT)
Dept: PODIATRY | Facility: CLINIC | Age: 68
End: 2023-06-19
Payer: MEDICARE

## 2023-06-19 DIAGNOSIS — E11.51 TYPE II DIABETES MELLITUS WITH PERIPHERAL CIRCULATORY DISORDER: ICD-10-CM

## 2023-06-19 DIAGNOSIS — E11.9 TYPE 2 DIABETES MELLITUS WITHOUT COMPLICATION, WITHOUT LONG-TERM CURRENT USE OF INSULIN: ICD-10-CM

## 2023-06-19 DIAGNOSIS — M21.371 ACQUIRED RIGHT FOOT DROP: ICD-10-CM

## 2023-06-19 DIAGNOSIS — B35.1 ONYCHOMYCOSIS DUE TO DERMATOPHYTE: ICD-10-CM

## 2023-06-19 DIAGNOSIS — M20.5X1 ACQUIRED HALLUX LIMITUS OF RIGHT FOOT: Primary | ICD-10-CM

## 2023-06-19 PROCEDURE — 3061F PR NEG MICROALBUMINURIA RESULT DOCUMENTED/REVIEW: ICD-10-PCS | Mod: CPTII,S$GLB,, | Performed by: PODIATRIST

## 2023-06-19 PROCEDURE — 99213 PR OFFICE/OUTPT VISIT, EST, LEVL III, 20-29 MIN: ICD-10-PCS | Mod: S$GLB,,, | Performed by: PODIATRIST

## 2023-06-19 PROCEDURE — 4010F PR ACE/ARB THEARPY RXD/TAKEN: ICD-10-PCS | Mod: CPTII,S$GLB,, | Performed by: PODIATRIST

## 2023-06-19 PROCEDURE — 99999 PR PBB SHADOW E&M-EST. PATIENT-LVL III: CPT | Mod: PBBFAC,,, | Performed by: PODIATRIST

## 2023-06-19 PROCEDURE — 3066F NEPHROPATHY DOC TX: CPT | Mod: CPTII,S$GLB,, | Performed by: PODIATRIST

## 2023-06-19 PROCEDURE — 1126F PR PAIN SEVERITY QUANTIFIED, NO PAIN PRESENT: ICD-10-PCS | Mod: CPTII,S$GLB,, | Performed by: PODIATRIST

## 2023-06-19 PROCEDURE — 99213 OFFICE O/P EST LOW 20 MIN: CPT | Mod: S$GLB,,, | Performed by: PODIATRIST

## 2023-06-19 PROCEDURE — 1159F MED LIST DOCD IN RCRD: CPT | Mod: CPTII,S$GLB,, | Performed by: PODIATRIST

## 2023-06-19 PROCEDURE — 99999 PR PBB SHADOW E&M-EST. PATIENT-LVL III: ICD-10-PCS | Mod: PBBFAC,,, | Performed by: PODIATRIST

## 2023-06-19 PROCEDURE — 3061F NEG MICROALBUMINURIA REV: CPT | Mod: CPTII,S$GLB,, | Performed by: PODIATRIST

## 2023-06-19 PROCEDURE — 1126F AMNT PAIN NOTED NONE PRSNT: CPT | Mod: CPTII,S$GLB,, | Performed by: PODIATRIST

## 2023-06-19 PROCEDURE — 4010F ACE/ARB THERAPY RXD/TAKEN: CPT | Mod: CPTII,S$GLB,, | Performed by: PODIATRIST

## 2023-06-19 PROCEDURE — 3066F PR DOCUMENTATION OF TREATMENT FOR NEPHROPATHY: ICD-10-PCS | Mod: CPTII,S$GLB,, | Performed by: PODIATRIST

## 2023-06-19 PROCEDURE — 1160F RVW MEDS BY RX/DR IN RCRD: CPT | Mod: CPTII,S$GLB,, | Performed by: PODIATRIST

## 2023-06-19 PROCEDURE — 1159F PR MEDICATION LIST DOCUMENTED IN MEDICAL RECORD: ICD-10-PCS | Mod: CPTII,S$GLB,, | Performed by: PODIATRIST

## 2023-06-19 PROCEDURE — 1160F PR REVIEW ALL MEDS BY PRESCRIBER/CLIN PHARMACIST DOCUMENTED: ICD-10-PCS | Mod: CPTII,S$GLB,, | Performed by: PODIATRIST

## 2023-06-19 PROCEDURE — 3044F PR MOST RECENT HEMOGLOBIN A1C LEVEL <7.0%: ICD-10-PCS | Mod: CPTII,S$GLB,, | Performed by: PODIATRIST

## 2023-06-19 PROCEDURE — 3044F HG A1C LEVEL LT 7.0%: CPT | Mod: CPTII,S$GLB,, | Performed by: PODIATRIST

## 2023-06-19 RX ORDER — LOSARTAN POTASSIUM 25 MG/1
100 TABLET ORAL DAILY
Qty: 90 TABLET | Refills: 3 | Status: SHIPPED | OUTPATIENT
Start: 2023-06-19 | End: 2023-07-06 | Stop reason: SDUPTHER

## 2023-06-19 NOTE — TELEPHONE ENCOUNTER
No care due was identified.  [facility] Embedded Care Due Messages. Reference number: [id number].   6/18/2023 7:53:39 PM CDT

## 2023-06-20 ENCOUNTER — HOSPITAL ENCOUNTER (OUTPATIENT)
Dept: RADIOLOGY | Facility: HOSPITAL | Age: 68
Discharge: HOME | End: 2023-06-20
Attending: PODIATRIST
Payer: MEDICARE

## 2023-06-20 DIAGNOSIS — M20.5X1 ACQUIRED HALLUX LIMITUS OF RIGHT FOOT: ICD-10-CM

## 2023-06-20 PROCEDURE — 73630 X-RAY EXAM OF FOOT: CPT | Mod: 26,RT,, | Performed by: RADIOLOGY

## 2023-06-20 PROCEDURE — 73630 X-RAY EXAM OF FOOT: CPT | Mod: TC,FY,PO,RT

## 2023-06-20 PROCEDURE — 73630 XR FOOT COMPLETE 3 VIEW RIGHT: ICD-10-PCS | Mod: 26,RT,, | Performed by: RADIOLOGY

## 2023-06-21 ENCOUNTER — TELEPHONE (OUTPATIENT)
Dept: PODIATRY | Facility: CLINIC | Age: 68
End: 2023-06-21
Payer: MEDICARE

## 2023-06-21 NOTE — PROGRESS NOTES
There is arthritic changes and spurring at the big toe joint as suspected, keep shoes loose in that area try to keep the shoe from rubbing on where that bump is

## 2023-06-21 NOTE — TELEPHONE ENCOUNTER
----- Message from Nikita Martínez, RIYA sent at 6/21/2023 12:19 PM CDT -----  There is arthritic changes and spurring at the big toe joint as suspected, keep shoes loose in that area try to keep the shoe from rubbing on where that bump is

## 2023-06-23 ENCOUNTER — PATIENT MESSAGE (OUTPATIENT)
Dept: FAMILY MEDICINE | Facility: CLINIC | Age: 68
End: 2023-06-23
Payer: MEDICARE

## 2023-06-24 ENCOUNTER — PATIENT MESSAGE (OUTPATIENT)
Dept: FAMILY MEDICINE | Facility: CLINIC | Age: 68
End: 2023-06-24
Payer: MEDICARE

## 2023-06-25 NOTE — PROGRESS NOTES
Subjective:      Patient ID: Samuel Cast is a 67 y.o. male.    Chief Complaint: Foot Pain (Rt foot)      Samuel is a 67 y.o. male who presents to the clinic upon referral from Dr. Evelia yang. provider found  for evaluation and treatment of diabetic feet. Samuel has a past medical history of Diabetes mellitus, type 2, Hypertension, Seizures, and Stroke (07/2007). Patient relates no major problem with feet. Only complaints today consist of right foot pain and lump and the dorsal foot at the base of the big toe, here for evaluation of this    PCP: Nallely Jurado MD    Date Last Seen by PCP: 1/26/23 Dr Nguyen with endocrinology    Current shoe gear: Casual shoes    Hemoglobin A1C   Date Value Ref Range Status   06/20/2023 4.8 4.0 - 5.6 % Final     Comment:     ADA Screening Guidelines:  5.7-6.4%  Consistent with prediabetes  >or=6.5%  Consistent with diabetes    High levels of fetal hemoglobin interfere with the HbA1C  assay. Heterozygous hemoglobin variants (HbS, HgC, etc)do  not significantly interfere with this assay.   However, presence of multiple variants may affect accuracy.     01/19/2023 4.9 4.0 - 5.6 % Final     Comment:     ADA Screening Guidelines:  5.7-6.4%  Consistent with prediabetes  >or=6.5%  Consistent with diabetes    High levels of fetal hemoglobin interfere with the HbA1C  assay. Heterozygous hemoglobin variants (HbS, HgC, etc)do  not significantly interfere with this assay.   However, presence of multiple variants may affect accuracy.     10/14/2022 5.2 4.0 - 5.6 % Final     Comment:     ADA Screening Guidelines:  5.7-6.4%  Consistent with prediabetes  >or=6.5%  Consistent with diabetes    High levels of fetal hemoglobin interfere with the HbA1C  assay. Heterozygous hemoglobin variants (HbS, HgC, etc)do  not significantly interfere with this assay.   However, presence of multiple variants may affect accuracy.             Review of Systems   Constitutional: Negative for chills and fever.    Cardiovascular:  Positive for leg swelling. Negative for claudication.   Respiratory:  Negative for shortness of breath.    Skin:  Positive for nail changes. Negative for itching and rash.   Musculoskeletal:  Negative for muscle cramps, muscle weakness and myalgias.   Gastrointestinal:  Negative for nausea and vomiting.   Neurological:  Negative for focal weakness, loss of balance, numbness and paresthesias.         Objective:      Physical Exam  Constitutional:       General: He is not in acute distress.     Appearance: He is well-developed. He is not diaphoretic.   Cardiovascular:      Pulses:           Dorsalis pedis pulses are 2+ on the right side and 2+ on the left side.        Posterior tibial pulses are 2+ on the right side and 2+ on the left side.      Comments: < 3 sec capillary refill time to toes 1-5 bilateral. Toes and feet are warm to touch proximally with normal distal cooling b/l. There is no hair growth on the feet and toes b/l. There is edema b/l. No spider veins or varicosities present b/l.     Musculoskeletal:      Comments: Equinus noted b/l ankles with < 10 deg DF noted. Right sided muscle weakness to PF, DF, eversion and inversion 3-4/5. Passive range of motion of ankle and pedal joints is painless b/l.    RIght great toe dorsal spurring noted at the first MTPJ with limited ROM at the joint   Skin:     General: Skin is warm and dry.      Coloration: Skin is not pale.      Findings: No abrasion, bruising, burn, ecchymosis, erythema, laceration, lesion, petechiae or rash.      Nails: There is no clubbing.      Comments: Skin is thin and atrophic bilateral    Nails 1-5 bilateral are thick 3-4 mm, long 5-15 mm, and discolored with subungual debris       Neurological:      Mental Status: He is alert and oriented to person, place, and time.      Sensory: No sensory deficit.      Motor: No tremor, atrophy or abnormal muscle tone.      Comments: Negative tinel sign bilateral.   Psychiatric:          Behavior: Behavior normal.             Assessment:       Encounter Diagnoses   Name Primary?    Type 2 diabetes mellitus without complication, without long-term current use of insulin     Type II diabetes mellitus with peripheral circulatory disorder     Onychomycosis due to dermatophyte     Acquired right foot drop     Acquired hallux limitus of right foot Yes             Plan:       Samuel was seen today for foot pain.    Diagnoses and all orders for this visit:    Acquired hallux limitus of right foot  -     X-Ray Foot Complete Right; Future    Type 2 diabetes mellitus without complication, without long-term current use of insulin    Type II diabetes mellitus with peripheral circulatory disorder    Onychomycosis due to dermatophyte    Acquired right foot drop        I counseled the patient on his conditions, their implications and medical management.    Shoe inspection. Diabetic Foot Education. Patient reminded of the importance of good nutrition and blood sugar control to help prevent podiatric complications of diabetes. Patient instructed on proper foot hygeine. We discussed wearing proper shoe gear, daily foot inspections, never walking without protective shoe gear, never putting sharp instruments to feet     DIscussed that the bump is a spur noted with arthritis of the bog toe joint, will get an x-ray to confirm this    Recommend loose shoes to the area supportive shoe if ambulating can use voltaren gel to the area of pain    - Return for routine care exam    Nikita Martínez DPM

## 2023-06-26 RX ORDER — PEN NEEDLE, DIABETIC 30 GX3/16"
NEEDLE, DISPOSABLE MISCELLANEOUS
Qty: 200 EACH | Refills: 1 | Status: SHIPPED | OUTPATIENT
Start: 2023-06-26 | End: 2024-01-17

## 2023-06-27 ENCOUNTER — OFFICE VISIT (OUTPATIENT)
Dept: ENDOCRINOLOGY | Facility: CLINIC | Age: 68
End: 2023-06-27
Payer: MEDICARE

## 2023-06-27 VITALS
HEIGHT: 76 IN | BODY MASS INDEX: 27.02 KG/M2 | DIASTOLIC BLOOD PRESSURE: 80 MMHG | SYSTOLIC BLOOD PRESSURE: 130 MMHG | HEART RATE: 65 BPM

## 2023-06-27 DIAGNOSIS — E11.65 TYPE 2 DIABETES MELLITUS WITH HYPERGLYCEMIA, WITHOUT LONG-TERM CURRENT USE OF INSULIN: Primary | ICD-10-CM

## 2023-06-27 DIAGNOSIS — Z86.73 HISTORY OF CARDIOEMBOLIC CEREBROVASCULAR ACCIDENT (CVA): ICD-10-CM

## 2023-06-27 DIAGNOSIS — I10 PRIMARY HYPERTENSION: ICD-10-CM

## 2023-06-27 DIAGNOSIS — G40.909 SEIZURE DISORDER: ICD-10-CM

## 2023-06-27 PROCEDURE — 3061F PR NEG MICROALBUMINURIA RESULT DOCUMENTED/REVIEW: ICD-10-PCS | Mod: CPTII,S$GLB,, | Performed by: NURSE PRACTITIONER

## 2023-06-27 PROCEDURE — 3079F PR MOST RECENT DIASTOLIC BLOOD PRESSURE 80-89 MM HG: ICD-10-PCS | Mod: CPTII,S$GLB,, | Performed by: NURSE PRACTITIONER

## 2023-06-27 PROCEDURE — 99999 PR PBB SHADOW E&M-EST. PATIENT-LVL IV: ICD-10-PCS | Mod: PBBFAC,,, | Performed by: NURSE PRACTITIONER

## 2023-06-27 PROCEDURE — 99214 OFFICE O/P EST MOD 30 MIN: CPT | Mod: S$GLB,,, | Performed by: NURSE PRACTITIONER

## 2023-06-27 PROCEDURE — 3288F PR FALLS RISK ASSESSMENT DOCUMENTED: ICD-10-PCS | Mod: CPTII,S$GLB,, | Performed by: NURSE PRACTITIONER

## 2023-06-27 PROCEDURE — 1159F PR MEDICATION LIST DOCUMENTED IN MEDICAL RECORD: ICD-10-PCS | Mod: CPTII,S$GLB,, | Performed by: NURSE PRACTITIONER

## 2023-06-27 PROCEDURE — 1160F RVW MEDS BY RX/DR IN RCRD: CPT | Mod: CPTII,S$GLB,, | Performed by: NURSE PRACTITIONER

## 2023-06-27 PROCEDURE — 3044F PR MOST RECENT HEMOGLOBIN A1C LEVEL <7.0%: ICD-10-PCS | Mod: CPTII,S$GLB,, | Performed by: NURSE PRACTITIONER

## 2023-06-27 PROCEDURE — 3066F NEPHROPATHY DOC TX: CPT | Mod: CPTII,S$GLB,, | Performed by: NURSE PRACTITIONER

## 2023-06-27 PROCEDURE — 3008F PR BODY MASS INDEX (BMI) DOCUMENTED: ICD-10-PCS | Mod: CPTII,S$GLB,, | Performed by: NURSE PRACTITIONER

## 2023-06-27 PROCEDURE — 4010F PR ACE/ARB THEARPY RXD/TAKEN: ICD-10-PCS | Mod: CPTII,S$GLB,, | Performed by: NURSE PRACTITIONER

## 2023-06-27 PROCEDURE — 3075F PR MOST RECENT SYSTOLIC BLOOD PRESS GE 130-139MM HG: ICD-10-PCS | Mod: CPTII,S$GLB,, | Performed by: NURSE PRACTITIONER

## 2023-06-27 PROCEDURE — 3061F NEG MICROALBUMINURIA REV: CPT | Mod: CPTII,S$GLB,, | Performed by: NURSE PRACTITIONER

## 2023-06-27 PROCEDURE — 3044F HG A1C LEVEL LT 7.0%: CPT | Mod: CPTII,S$GLB,, | Performed by: NURSE PRACTITIONER

## 2023-06-27 PROCEDURE — 99999 PR PBB SHADOW E&M-EST. PATIENT-LVL IV: CPT | Mod: PBBFAC,,, | Performed by: NURSE PRACTITIONER

## 2023-06-27 PROCEDURE — 3066F PR DOCUMENTATION OF TREATMENT FOR NEPHROPATHY: ICD-10-PCS | Mod: CPTII,S$GLB,, | Performed by: NURSE PRACTITIONER

## 2023-06-27 PROCEDURE — 3008F BODY MASS INDEX DOCD: CPT | Mod: CPTII,S$GLB,, | Performed by: NURSE PRACTITIONER

## 2023-06-27 PROCEDURE — 3075F SYST BP GE 130 - 139MM HG: CPT | Mod: CPTII,S$GLB,, | Performed by: NURSE PRACTITIONER

## 2023-06-27 PROCEDURE — 1160F PR REVIEW ALL MEDS BY PRESCRIBER/CLIN PHARMACIST DOCUMENTED: ICD-10-PCS | Mod: CPTII,S$GLB,, | Performed by: NURSE PRACTITIONER

## 2023-06-27 PROCEDURE — 1159F MED LIST DOCD IN RCRD: CPT | Mod: CPTII,S$GLB,, | Performed by: NURSE PRACTITIONER

## 2023-06-27 PROCEDURE — 1126F AMNT PAIN NOTED NONE PRSNT: CPT | Mod: CPTII,S$GLB,, | Performed by: NURSE PRACTITIONER

## 2023-06-27 PROCEDURE — 4010F ACE/ARB THERAPY RXD/TAKEN: CPT | Mod: CPTII,S$GLB,, | Performed by: NURSE PRACTITIONER

## 2023-06-27 PROCEDURE — 1126F PR PAIN SEVERITY QUANTIFIED, NO PAIN PRESENT: ICD-10-PCS | Mod: CPTII,S$GLB,, | Performed by: NURSE PRACTITIONER

## 2023-06-27 PROCEDURE — 99214 PR OFFICE/OUTPT VISIT, EST, LEVL IV, 30-39 MIN: ICD-10-PCS | Mod: S$GLB,,, | Performed by: NURSE PRACTITIONER

## 2023-06-27 PROCEDURE — 1101F PR PT FALLS ASSESS DOC 0-1 FALLS W/OUT INJ PAST YR: ICD-10-PCS | Mod: CPTII,S$GLB,, | Performed by: NURSE PRACTITIONER

## 2023-06-27 PROCEDURE — 1101F PT FALLS ASSESS-DOCD LE1/YR: CPT | Mod: CPTII,S$GLB,, | Performed by: NURSE PRACTITIONER

## 2023-06-27 PROCEDURE — 3079F DIAST BP 80-89 MM HG: CPT | Mod: CPTII,S$GLB,, | Performed by: NURSE PRACTITIONER

## 2023-06-27 PROCEDURE — 3288F FALL RISK ASSESSMENT DOCD: CPT | Mod: CPTII,S$GLB,, | Performed by: NURSE PRACTITIONER

## 2023-06-27 NOTE — PROGRESS NOTES
"CC: Mr. Samuel Cast arrives today for management of Type 2 DM and review of chronic medical conditions, as listed in the Visit Diagnosis section of this encounter.       HPI: Mr. Samuel Cast was diagnosed with Type 2 DM in 2007, during workup of stroke. Initial treatment consisted of metformin, glimepiride. Insulin added in 2022, following hospital stay after subarachnoid hemorrhage. Denies FH of DM. Denies hospitalizations due to DM.   Of note, he has R hemiplegia from past CVA, as well as expressive aphasia.     He is accompanied by his wife, Charisse, who contributes to most of the history.     Patient was last seen by me in January.     BG readings are checked 2x/day.          Hypoglycemia: No    Missing Insulin/PO medication doses: Rare     Exercise: does PT 1 day/week. Walking more often. Also getting OT for his R hand.     Dietary Habits: Eats 3 meals/day. Lunch may be more of a snack - SF pudding. Occasional snack - SF cookie. Avoids sugary beverages.     Last DM education appointment:    They are planning on returning to [place] in 1 year, which is where they are from and family still is.       CURRENT DIABETIC MEDS: metformin  mg BID, Toujeo 26 units QHS  Vial or pen: pen  Glucometer type: Relion    Previous DM treatments:  Metformin - abdominal cramping  Glimepiride   Novolog     Last Eye Exam: 1/2023, no DR  Last Podiatry Exam: 6/2023, + bone spur in R foot    REVIEW OF SYSTEMS  Constitutional: + fatigue, weakness. In wheelchair. + weight loss over the year  Cardiac: no palpitations or chest pain.  Respiratory: no cough or dyspnea.  GI: no c/o abdominal pain or nausea. Denies h/o pancreatitis.  Skin: no lesions or rashes.  Neuro: no numbness, tingling, or parasthesias.  Endocrine: denies polyphagia, polydipsia, polyuria      Personally reviewed Past Medical, Surgical, Social History.    Vital Signs  /80   Pulse 65   Ht 6' 4" (1.93 m)   BMI 27.02 kg/m²     Personally reviewed the below " labs:    Hemoglobin A1C   Date Value Ref Range Status   06/20/2023 4.8 4.0 - 5.6 % Final     Comment:     ADA Screening Guidelines:  5.7-6.4%  Consistent with prediabetes  >or=6.5%  Consistent with diabetes    High levels of fetal hemoglobin interfere with the HbA1C  assay. Heterozygous hemoglobin variants (HbS, HgC, etc)do  not significantly interfere with this assay.   However, presence of multiple variants may affect accuracy.     01/19/2023 4.9 4.0 - 5.6 % Final     Comment:     ADA Screening Guidelines:  5.7-6.4%  Consistent with prediabetes  >or=6.5%  Consistent with diabetes    High levels of fetal hemoglobin interfere with the HbA1C  assay. Heterozygous hemoglobin variants (HbS, HgC, etc)do  not significantly interfere with this assay.   However, presence of multiple variants may affect accuracy.     10/14/2022 5.2 4.0 - 5.6 % Final     Comment:     ADA Screening Guidelines:  5.7-6.4%  Consistent with prediabetes  >or=6.5%  Consistent with diabetes    High levels of fetal hemoglobin interfere with the HbA1C  assay. Heterozygous hemoglobin variants (HbS, HgC, etc)do  not significantly interfere with this assay.   However, presence of multiple variants may affect accuracy.         Chemistry        Component Value Date/Time     01/19/2023 1600    K 4.7 01/19/2023 1600     01/19/2023 1600    CO2 22 (L) 01/19/2023 1600    BUN 18 01/19/2023 1600    CREATININE 0.8 01/19/2023 1600    GLU 78 01/19/2023 1600        Component Value Date/Time    CALCIUM 10.2 01/19/2023 1600    ALKPHOS 68 01/19/2023 1600    AST 19 01/19/2023 1600    ALT 24 01/19/2023 1600    BILITOT 0.9 01/19/2023 1600    ESTGFRAFRICA >60 05/15/2022 0757    EGFRNONAA >60 05/15/2022 0757          Lab Results   Component Value Date    CHOL 89 (L) 06/20/2023    CHOL 139 08/22/2022    CHOL 178 04/27/2022     Lab Results   Component Value Date    HDL 29 (L) 06/20/2023    HDL 30 (L) 08/22/2022    HDL 41 04/27/2022     Lab Results   Component Value  Date    LDLCALC 44.2 (L) 06/20/2023    LDLCALC 83.0 08/22/2022    LDLCALC 104.0 04/27/2022     Lab Results   Component Value Date    TRIG 79 06/20/2023    TRIG 130 08/22/2022    TRIG 165 (H) 04/27/2022     Lab Results   Component Value Date    CHOLHDL 32.6 06/20/2023    CHOLHDL 21.6 08/22/2022    CHOLHDL 23.0 04/27/2022       Lab Results   Component Value Date    MICALBCREAT 4.2 06/20/2023     Lab Results   Component Value Date    TSH 2.250 08/21/2022       CrCl cannot be calculated (Patient's most recent lab result is older than the maximum 7 days allowed.).    No results found for: [id number]      PHYSICAL EXAMINATION  Constitutional: Appears well, no distress, in wheelchair  Respiratory: CTA, even and unlabored.  Cardiovascular: RRR, no murmurs, no carotid bruits.   GI: bowel sounds active, no hernia noted  Skin: warm and dry; no visible wounds  Neuro: expressive aphasia noted.   Feet: appropriate footwear.     Goals         80 <= Glucose <= 180 (pt-stated)       Blood Pressure < 130/80 (pt-stated)       Exercise at least 150 minutes per week.       HEMOGLOBIN A1C < 8.0       Take at least one BP reading per week at various times of the day               Assessment/Plan  1. Type 2 diabetes mellitus with hyperglycemia, without long-term current use of insulin  -- A1c tightly controlled but no apparent hypoglycemia. Glucoses on logs appear higher than A1c suggests. Will continue weaning Toujeo.  -- decrease Toujeo to 20 units nightly  -- continue metformin  mg BID.   -- discussed replacing insulin with GLP-1RA in the future. Discussed Ozempic.   -- BG monitoring 1x/day, alternating times. Notify me if BG often  >150  -- reviewed dietary principles and offered online resources.     -- Discussed diagnosis of DM, A1c goals, progression of disease, long term complications and tx options.    -- Reviewed hypoglycemia management: treat with 4 oz of juice, 4 oz regular soda, or 4 glucose tablets. Monitor and repeat  treatment every 15 minutes until BG is >70 Then have a snack, which includes 15 grams of complex carbohydrates and protein.     -- takes statin, ARB   2. Primary hypertension  -- controlled  -- continue current meds   3. History of cardioembolic cerebrovascular accident (CVA)  -- w/R sided hemiplegia  -- avoid hypoglycemia.    4. Seizure disorder  -- avoid hypoglycemia  -- follows with neurology       FOLLOW UP  Follow up in about 4 months (around 10/27/2023).   Patient instructed to bring BG logs to each follow up   Patient encouraged to call for any BG/medication issues, concerns, or questions.    Orders Placed This Encounter   Procedures    Hemoglobin A1C    Comprehensive Metabolic Panel

## 2023-06-27 NOTE — PATIENT INSTRUCTIONS
American Diabetes Association for dietary resources.    Look up Diabetes Food Hub    Decrease Toujeo to 20 units. Continue metformin XR.    Send me message of fasting blood sugars are consistently > 130.

## 2023-07-05 ENCOUNTER — PATIENT MESSAGE (OUTPATIENT)
Dept: FAMILY MEDICINE | Facility: CLINIC | Age: 68
End: 2023-07-05
Payer: MEDICARE

## 2023-07-05 DIAGNOSIS — I10 ESSENTIAL HYPERTENSION: ICD-10-CM

## 2023-07-06 RX ORDER — LOSARTAN POTASSIUM 25 MG/1
100 TABLET ORAL DAILY
Qty: 120 TABLET | Refills: 11 | Status: SHIPPED | OUTPATIENT
Start: 2023-07-06 | End: 2024-07-05

## 2023-07-10 ENCOUNTER — OFFICE VISIT (OUTPATIENT)
Dept: OPTOMETRY | Facility: CLINIC | Age: 68
End: 2023-07-10
Payer: MEDICARE

## 2023-07-10 DIAGNOSIS — Z13.5 GLAUCOMA SCREENING: ICD-10-CM

## 2023-07-10 DIAGNOSIS — E11.36 CATARACT ASSOCIATED WITH TYPE 2 DIABETES MELLITUS: ICD-10-CM

## 2023-07-10 DIAGNOSIS — E11.9 DIABETES MELLITUS TYPE 2 WITHOUT RETINOPATHY: Primary | ICD-10-CM

## 2023-07-10 DIAGNOSIS — H35.033 HYPERTENSIVE RETINOPATHY OF BOTH EYES: ICD-10-CM

## 2023-07-10 PROCEDURE — 3061F NEG MICROALBUMINURIA REV: CPT | Mod: CPTII,S$GLB,, | Performed by: OPTOMETRIST

## 2023-07-10 PROCEDURE — 3061F PR NEG MICROALBUMINURIA RESULT DOCUMENTED/REVIEW: ICD-10-PCS | Mod: CPTII,S$GLB,, | Performed by: OPTOMETRIST

## 2023-07-10 PROCEDURE — 92014 COMPRE OPH EXAM EST PT 1/>: CPT | Mod: S$GLB,,, | Performed by: OPTOMETRIST

## 2023-07-10 PROCEDURE — 3044F HG A1C LEVEL LT 7.0%: CPT | Mod: CPTII,S$GLB,, | Performed by: OPTOMETRIST

## 2023-07-10 PROCEDURE — 1159F PR MEDICATION LIST DOCUMENTED IN MEDICAL RECORD: ICD-10-PCS | Mod: CPTII,S$GLB,, | Performed by: OPTOMETRIST

## 2023-07-10 PROCEDURE — 4010F ACE/ARB THERAPY RXD/TAKEN: CPT | Mod: CPTII,S$GLB,, | Performed by: OPTOMETRIST

## 2023-07-10 PROCEDURE — 3066F NEPHROPATHY DOC TX: CPT | Mod: CPTII,S$GLB,, | Performed by: OPTOMETRIST

## 2023-07-10 PROCEDURE — 2023F DILAT RTA XM W/O RTNOPTHY: CPT | Mod: CPTII,S$GLB,, | Performed by: OPTOMETRIST

## 2023-07-10 PROCEDURE — 3044F PR MOST RECENT HEMOGLOBIN A1C LEVEL <7.0%: ICD-10-PCS | Mod: CPTII,S$GLB,, | Performed by: OPTOMETRIST

## 2023-07-10 PROCEDURE — 1100F PR PT FALLS ASSESS DOC 2+ FALLS/FALL W/INJURY/YR: ICD-10-PCS | Mod: CPTII,S$GLB,, | Performed by: OPTOMETRIST

## 2023-07-10 PROCEDURE — 3288F PR FALLS RISK ASSESSMENT DOCUMENTED: ICD-10-PCS | Mod: CPTII,S$GLB,, | Performed by: OPTOMETRIST

## 2023-07-10 PROCEDURE — 1126F AMNT PAIN NOTED NONE PRSNT: CPT | Mod: CPTII,S$GLB,, | Performed by: OPTOMETRIST

## 2023-07-10 PROCEDURE — 3288F FALL RISK ASSESSMENT DOCD: CPT | Mod: CPTII,S$GLB,, | Performed by: OPTOMETRIST

## 2023-07-10 PROCEDURE — 92014 PR EYE EXAM, EST PATIENT,COMPREHESV: ICD-10-PCS | Mod: S$GLB,,, | Performed by: OPTOMETRIST

## 2023-07-10 PROCEDURE — 2023F PR DILATED RETINAL EXAM W/O EVID OF RETINOPATHY: ICD-10-PCS | Mod: CPTII,S$GLB,, | Performed by: OPTOMETRIST

## 2023-07-10 PROCEDURE — 1100F PTFALLS ASSESS-DOCD GE2>/YR: CPT | Mod: CPTII,S$GLB,, | Performed by: OPTOMETRIST

## 2023-07-10 PROCEDURE — 1159F MED LIST DOCD IN RCRD: CPT | Mod: CPTII,S$GLB,, | Performed by: OPTOMETRIST

## 2023-07-10 PROCEDURE — 4010F PR ACE/ARB THEARPY RXD/TAKEN: ICD-10-PCS | Mod: CPTII,S$GLB,, | Performed by: OPTOMETRIST

## 2023-07-10 PROCEDURE — 99999 PR PBB SHADOW E&M-EST. PATIENT-LVL III: CPT | Mod: PBBFAC,,, | Performed by: OPTOMETRIST

## 2023-07-10 PROCEDURE — 3066F PR DOCUMENTATION OF TREATMENT FOR NEPHROPATHY: ICD-10-PCS | Mod: CPTII,S$GLB,, | Performed by: OPTOMETRIST

## 2023-07-10 PROCEDURE — 1126F PR PAIN SEVERITY QUANTIFIED, NO PAIN PRESENT: ICD-10-PCS | Mod: CPTII,S$GLB,, | Performed by: OPTOMETRIST

## 2023-07-10 PROCEDURE — 99999 PR PBB SHADOW E&M-EST. PATIENT-LVL III: ICD-10-PCS | Mod: PBBFAC,,, | Performed by: OPTOMETRIST

## 2023-07-10 NOTE — PROGRESS NOTES
HPI    DLS  01/12/2023  Here for IOP ck and Dilation    Pt states [facility] is doing good.  No changes to report.  No drops. No f/f.   Last edited by Coral Engel on 7/10/2023  1:33 PM.            Assessment /Plan     For exam results, see Encounter Report.    Diabetes mellitus type 2 without retinopathy    Hypertensive retinopathy of both eyes    Cataract associated with type 2 diabetes mellitus    Glaucoma screening         No orestes/ no csme, gave Diabetic Retinopathy info, advise tight control glucose, BP---Advise annual dilated fundus exam  Vascular changes OU w/ nicking and Art narrowing------tight control BP   Blot heme from previous exam resolved, no new retinopathy noted OU     3.   Early vis sig  NS, not ready for consult   4.   Not suspect   5.   Updated specs rx, gave copy, ok continut with current specs        Discussed and educated patient on current findings /plan.  RTC 6 months annual DM2 exam,  prn if any changes / issues

## 2023-09-06 ENCOUNTER — PATIENT MESSAGE (OUTPATIENT)
Dept: ADMINISTRATIVE | Facility: OTHER | Age: 68
End: 2023-09-06
Payer: MEDICARE

## 2023-10-03 ENCOUNTER — OFFICE VISIT (OUTPATIENT)
Dept: PODIATRY | Facility: CLINIC | Age: 68
End: 2023-10-03
Payer: MEDICARE

## 2023-10-03 VITALS — WEIGHT: 222 LBS | HEIGHT: 76 IN | BODY MASS INDEX: 27.03 KG/M2

## 2023-10-03 DIAGNOSIS — E11.9 TYPE 2 DIABETES MELLITUS WITHOUT COMPLICATION, WITHOUT LONG-TERM CURRENT USE OF INSULIN: Primary | ICD-10-CM

## 2023-10-03 DIAGNOSIS — E11.51 TYPE II DIABETES MELLITUS WITH PERIPHERAL CIRCULATORY DISORDER: ICD-10-CM

## 2023-10-03 DIAGNOSIS — B35.1 ONYCHOMYCOSIS DUE TO DERMATOPHYTE: ICD-10-CM

## 2023-10-03 PROCEDURE — 99999 PR PBB SHADOW E&M-EST. PATIENT-LVL II: ICD-10-PCS | Mod: PBBFAC,,, | Performed by: PODIATRIST

## 2023-10-03 PROCEDURE — 11721 DEBRIDE NAIL 6 OR MORE: CPT | Mod: Q9,S$GLB,, | Performed by: PODIATRIST

## 2023-10-03 PROCEDURE — 11721 PR DEBRIDEMENT OF NAILS, 6 OR MORE: ICD-10-PCS | Mod: Q9,S$GLB,, | Performed by: PODIATRIST

## 2023-10-03 PROCEDURE — 99499 NO LOS: ICD-10-PCS | Mod: S$GLB,,, | Performed by: PODIATRIST

## 2023-10-03 PROCEDURE — 99999 PR PBB SHADOW E&M-EST. PATIENT-LVL II: CPT | Mod: PBBFAC,,, | Performed by: PODIATRIST

## 2023-10-03 PROCEDURE — 99499 UNLISTED E&M SERVICE: CPT | Mod: S$GLB,,, | Performed by: PODIATRIST

## 2023-10-03 NOTE — PROGRESS NOTES
Scheduled procedure with Patient at      Telephone Information:   Mobile [phone]      Scheduled Via: Case Creation for emq, case # [id number]  Patient prefers [facility] facility rather than the doctor's preferred facility  Procedure date: 12/14  Procedure time: 215pm  Rep Contacted?: n/a  Ana Program code:    phone?:Yes   email?:Yes  Entered into MD's [place]? n/a  Insurance confirmed as anth, will be the same at time of procedure?: Yes  Insurance Accepted at Facility? Yes    The following have been confirmed:  Latex Allergy No  Diabetic No  Sleep Apnea No  Diuretic/Water pill No  Defibrillator/Pacemaker No  MRSA hx No  Blood thinners: Coumadin (Wafarin) or Plavix No      Aspirin Yes      Phentermine (diet pill) No  Pre-Op testing required n/a, Patient informed NA  Prep required? Yes, Pharmacy is [facility] [address] [place] [address]   P: [phone]  Briefly reviewed? Yes; Prep cost range discussed? Yes  If procedure is scheduled 7 days or less, patient was told to  prep letter?: n/a     Subjective:      Patient ID: Samuel Cast is a 67 y.o. male.    Chief Complaint: Foot Problem and Nail Care (Bone spurs and nail care)      Samuel is a 67 y.o. male who presents to the clinic upon referral from Dr. Evelia yang. provider found  for evaluation and treatment of diabetic feet. Samuel has a past medical history of Diabetes mellitus, type 2, Hypertension, Seizures, and Stroke (07/2007). Patient relates no major problem with feet. Only complaints today consist of nails needing debridement    PCP: Nallely Jurado MD    Date Last Seen by PCP: 6/27/23 Dr Nguyen with endocrinology    Current shoe gear: Casual shoes    Hemoglobin A1C   Date Value Ref Range Status   06/20/2023 4.8 4.0 - 5.6 % Final     Comment:     ADA Screening Guidelines:  5.7-6.4%  Consistent with prediabetes  >or=6.5%  Consistent with diabetes    High levels of fetal hemoglobin interfere with the HbA1C  assay. Heterozygous hemoglobin variants (HbS, HgC, etc)do  not significantly interfere with this assay.   However, presence of multiple variants may affect accuracy.     01/19/2023 4.9 4.0 - 5.6 % Final     Comment:     ADA Screening Guidelines:  5.7-6.4%  Consistent with prediabetes  >or=6.5%  Consistent with diabetes    High levels of fetal hemoglobin interfere with the HbA1C  assay. Heterozygous hemoglobin variants (HbS, HgC, etc)do  not significantly interfere with this assay.   However, presence of multiple variants may affect accuracy.     10/14/2022 5.2 4.0 - 5.6 % Final     Comment:     ADA Screening Guidelines:  5.7-6.4%  Consistent with prediabetes  >or=6.5%  Consistent with diabetes    High levels of fetal hemoglobin interfere with the HbA1C  assay. Heterozygous hemoglobin variants (HbS, HgC, etc)do  not significantly interfere with this assay.   However, presence of multiple variants may affect accuracy.             Review of Systems   Constitutional: Negative for chills and fever.   Cardiovascular:  Positive for leg swelling.  Negative for claudication.   Respiratory:  Negative for shortness of breath.    Skin:  Positive for nail changes. Negative for itching and rash.   Musculoskeletal:  Negative for muscle cramps, muscle weakness and myalgias.   Gastrointestinal:  Negative for nausea and vomiting.   Neurological:  Negative for focal weakness, loss of balance, numbness and paresthesias.           Objective:      Physical Exam  Constitutional:       General: He is not in acute distress.     Appearance: He is well-developed. He is not diaphoretic.   Cardiovascular:      Pulses:           Dorsalis pedis pulses are 2+ on the right side and 2+ on the left side.        Posterior tibial pulses are 2+ on the right side and 2+ on the left side.      Comments: < 3 sec capillary refill time to toes 1-5 bilateral. Toes and feet are warm to touch proximally with normal distal cooling b/l. There is no hair growth on the feet and toes b/l. There is edema b/l. No spider veins or varicosities present b/l.     Musculoskeletal:      Comments: Equinus noted b/l ankles with < 10 deg DF noted. Right sided muscle weakness to PF, DF, eversion and inversion 3-4/5. Passive range of motion of ankle and pedal joints is painless b/l.    RIght great toe dorsal spurring noted at the first MTPJ with limited ROM at the joint   Skin:     General: Skin is warm and dry.      Coloration: Skin is not pale.      Findings: No abrasion, bruising, burn, ecchymosis, erythema, laceration, lesion, petechiae or rash.      Nails: There is no clubbing.      Comments: Skin is thin and atrophic bilateral    Nails 1-5 bilateral are thick 3-4 mm, long 5-15 mm, and discolored with subungual debris       Neurological:      Mental Status: He is alert and oriented to person, place, and time.      Sensory: No sensory deficit.      Motor: No tremor, atrophy or abnormal muscle tone.      Comments: Negative tinel sign bilateral.   Psychiatric:         Behavior: Behavior normal.                Assessment:       Encounter Diagnoses   Name Primary?    Type 2 diabetes mellitus without complication, without long-term current use of insulin Yes    Type II diabetes mellitus with peripheral circulatory disorder     Onychomycosis due to dermatophyte                Plan:       Samuel was seen today for foot problem and nail care.    Diagnoses and all orders for this visit:    Type 2 diabetes mellitus without complication, without long-term current use of insulin    Type II diabetes mellitus with peripheral circulatory disorder    Onychomycosis due to dermatophyte          I counseled the patient on his conditions, their implications and medical management.    Shoe inspection. Diabetic Foot Education. Patient reminded of the importance of good nutrition and blood sugar control to help prevent podiatric complications of diabetes. Patient instructed on proper foot hygeine. We discussed wearing proper shoe gear, daily foot inspections, never walking without protective shoe gear, never putting sharp instruments to feet     With patient's verbal consent, nails were aggressively reduced and debrided x 10 to their soft tissue attachment mechanically and with electric , removing all offending nail and debris. Patient relates relief following the procedure. No anesthesia or hemostasis required. No blood loss.       - Return for routine care exam    Nikita Martínez DPM

## 2023-10-24 ENCOUNTER — LAB VISIT (OUTPATIENT)
Dept: LAB | Facility: HOSPITAL | Age: 68
End: 2023-10-24
Payer: MEDICARE

## 2023-10-24 DIAGNOSIS — E11.65 TYPE 2 DIABETES MELLITUS WITH HYPERGLYCEMIA, WITHOUT LONG-TERM CURRENT USE OF INSULIN: ICD-10-CM

## 2023-10-24 LAB
ESTIMATED AVG GLUCOSE: 94 MG/DL (ref 68–131)
HBA1C MFR BLD: 4.9 % (ref 4–5.6)

## 2023-10-24 PROCEDURE — 83036 HEMOGLOBIN GLYCOSYLATED A1C: CPT | Performed by: NURSE PRACTITIONER

## 2023-10-24 PROCEDURE — 36415 COLL VENOUS BLD VENIPUNCTURE: CPT | Mod: PO | Performed by: NURSE PRACTITIONER

## 2023-10-24 PROCEDURE — 80053 COMPREHEN METABOLIC PANEL: CPT | Performed by: NURSE PRACTITIONER

## 2023-10-25 LAB
ALBUMIN SERPL BCP-MCNC: 3.9 G/DL (ref 3.5–5.2)
ALP SERPL-CCNC: 68 U/L (ref 55–135)
ALT SERPL W/O P-5'-P-CCNC: 27 U/L (ref 10–44)
ANION GAP SERPL CALC-SCNC: 11 MMOL/L (ref 8–16)
AST SERPL-CCNC: 24 U/L (ref 10–40)
BILIRUB SERPL-MCNC: 1.1 MG/DL (ref 0.1–1)
BUN SERPL-MCNC: 18 MG/DL (ref 8–23)
CALCIUM SERPL-MCNC: 9.8 MG/DL (ref 8.7–10.5)
CHLORIDE SERPL-SCNC: 105 MMOL/L (ref 95–110)
CO2 SERPL-SCNC: 22 MMOL/L (ref 23–29)
CREAT SERPL-MCNC: 0.8 MG/DL (ref 0.5–1.4)
EST. GFR  (NO RACE VARIABLE): >60 ML/MIN/1.73 M^2
GLUCOSE SERPL-MCNC: 144 MG/DL (ref 70–110)
POTASSIUM SERPL-SCNC: 4.1 MMOL/L (ref 3.5–5.1)
PROT SERPL-MCNC: 7.2 G/DL (ref 6–8.4)
SODIUM SERPL-SCNC: 138 MMOL/L (ref 136–145)

## 2023-10-31 ENCOUNTER — OFFICE VISIT (OUTPATIENT)
Dept: ENDOCRINOLOGY | Facility: CLINIC | Age: 68
End: 2023-10-31
Payer: MEDICARE

## 2023-10-31 VITALS
HEIGHT: 76 IN | DIASTOLIC BLOOD PRESSURE: 80 MMHG | WEIGHT: 222 LBS | SYSTOLIC BLOOD PRESSURE: 120 MMHG | HEART RATE: 60 BPM | BODY MASS INDEX: 27.03 KG/M2

## 2023-10-31 DIAGNOSIS — I10 PRIMARY HYPERTENSION: ICD-10-CM

## 2023-10-31 DIAGNOSIS — G40.909 SEIZURE DISORDER: ICD-10-CM

## 2023-10-31 DIAGNOSIS — E11.65 TYPE 2 DIABETES MELLITUS WITH HYPERGLYCEMIA, WITHOUT LONG-TERM CURRENT USE OF INSULIN: Primary | ICD-10-CM

## 2023-10-31 DIAGNOSIS — Z86.73 HISTORY OF CARDIOEMBOLIC CEREBROVASCULAR ACCIDENT (CVA): ICD-10-CM

## 2023-10-31 PROCEDURE — 1100F PTFALLS ASSESS-DOCD GE2>/YR: CPT | Mod: CPTII,S$GLB,, | Performed by: NURSE PRACTITIONER

## 2023-10-31 PROCEDURE — 4010F PR ACE/ARB THEARPY RXD/TAKEN: ICD-10-PCS | Mod: CPTII,S$GLB,, | Performed by: NURSE PRACTITIONER

## 2023-10-31 PROCEDURE — 99999 PR PBB SHADOW E&M-EST. PATIENT-LVL IV: CPT | Mod: PBBFAC,,, | Performed by: NURSE PRACTITIONER

## 2023-10-31 PROCEDURE — 3044F HG A1C LEVEL LT 7.0%: CPT | Mod: CPTII,S$GLB,, | Performed by: NURSE PRACTITIONER

## 2023-10-31 PROCEDURE — 3066F NEPHROPATHY DOC TX: CPT | Mod: CPTII,S$GLB,, | Performed by: NURSE PRACTITIONER

## 2023-10-31 PROCEDURE — 1160F PR REVIEW ALL MEDS BY PRESCRIBER/CLIN PHARMACIST DOCUMENTED: ICD-10-PCS | Mod: CPTII,S$GLB,, | Performed by: NURSE PRACTITIONER

## 2023-10-31 PROCEDURE — 3044F PR MOST RECENT HEMOGLOBIN A1C LEVEL <7.0%: ICD-10-PCS | Mod: CPTII,S$GLB,, | Performed by: NURSE PRACTITIONER

## 2023-10-31 PROCEDURE — 3288F PR FALLS RISK ASSESSMENT DOCUMENTED: ICD-10-PCS | Mod: CPTII,S$GLB,, | Performed by: NURSE PRACTITIONER

## 2023-10-31 PROCEDURE — 1100F PR PT FALLS ASSESS DOC 2+ FALLS/FALL W/INJURY/YR: ICD-10-PCS | Mod: CPTII,S$GLB,, | Performed by: NURSE PRACTITIONER

## 2023-10-31 PROCEDURE — 3066F PR DOCUMENTATION OF TREATMENT FOR NEPHROPATHY: ICD-10-PCS | Mod: CPTII,S$GLB,, | Performed by: NURSE PRACTITIONER

## 2023-10-31 PROCEDURE — 4010F ACE/ARB THERAPY RXD/TAKEN: CPT | Mod: CPTII,S$GLB,, | Performed by: NURSE PRACTITIONER

## 2023-10-31 PROCEDURE — 3074F PR MOST RECENT SYSTOLIC BLOOD PRESSURE < 130 MM HG: ICD-10-PCS | Mod: CPTII,S$GLB,, | Performed by: NURSE PRACTITIONER

## 2023-10-31 PROCEDURE — 1160F RVW MEDS BY RX/DR IN RCRD: CPT | Mod: CPTII,S$GLB,, | Performed by: NURSE PRACTITIONER

## 2023-10-31 PROCEDURE — 99999 PR PBB SHADOW E&M-EST. PATIENT-LVL IV: ICD-10-PCS | Mod: PBBFAC,,, | Performed by: NURSE PRACTITIONER

## 2023-10-31 PROCEDURE — 99214 OFFICE O/P EST MOD 30 MIN: CPT | Mod: S$GLB,,, | Performed by: NURSE PRACTITIONER

## 2023-10-31 PROCEDURE — 3074F SYST BP LT 130 MM HG: CPT | Mod: CPTII,S$GLB,, | Performed by: NURSE PRACTITIONER

## 2023-10-31 PROCEDURE — 3288F FALL RISK ASSESSMENT DOCD: CPT | Mod: CPTII,S$GLB,, | Performed by: NURSE PRACTITIONER

## 2023-10-31 PROCEDURE — 3008F PR BODY MASS INDEX (BMI) DOCUMENTED: ICD-10-PCS | Mod: CPTII,S$GLB,, | Performed by: NURSE PRACTITIONER

## 2023-10-31 PROCEDURE — 1126F PR PAIN SEVERITY QUANTIFIED, NO PAIN PRESENT: ICD-10-PCS | Mod: CPTII,S$GLB,, | Performed by: NURSE PRACTITIONER

## 2023-10-31 PROCEDURE — 1159F MED LIST DOCD IN RCRD: CPT | Mod: CPTII,S$GLB,, | Performed by: NURSE PRACTITIONER

## 2023-10-31 PROCEDURE — 3079F PR MOST RECENT DIASTOLIC BLOOD PRESSURE 80-89 MM HG: ICD-10-PCS | Mod: CPTII,S$GLB,, | Performed by: NURSE PRACTITIONER

## 2023-10-31 PROCEDURE — 1126F AMNT PAIN NOTED NONE PRSNT: CPT | Mod: CPTII,S$GLB,, | Performed by: NURSE PRACTITIONER

## 2023-10-31 PROCEDURE — 3008F BODY MASS INDEX DOCD: CPT | Mod: CPTII,S$GLB,, | Performed by: NURSE PRACTITIONER

## 2023-10-31 PROCEDURE — 1159F PR MEDICATION LIST DOCUMENTED IN MEDICAL RECORD: ICD-10-PCS | Mod: CPTII,S$GLB,, | Performed by: NURSE PRACTITIONER

## 2023-10-31 PROCEDURE — 3061F PR NEG MICROALBUMINURIA RESULT DOCUMENTED/REVIEW: ICD-10-PCS | Mod: CPTII,S$GLB,, | Performed by: NURSE PRACTITIONER

## 2023-10-31 PROCEDURE — 3079F DIAST BP 80-89 MM HG: CPT | Mod: CPTII,S$GLB,, | Performed by: NURSE PRACTITIONER

## 2023-10-31 PROCEDURE — 99214 PR OFFICE/OUTPT VISIT, EST, LEVL IV, 30-39 MIN: ICD-10-PCS | Mod: S$GLB,,, | Performed by: NURSE PRACTITIONER

## 2023-10-31 PROCEDURE — 3061F NEG MICROALBUMINURIA REV: CPT | Mod: CPTII,S$GLB,, | Performed by: NURSE PRACTITIONER

## 2023-10-31 RX ORDER — INSULIN GLARGINE 300 U/ML
18 INJECTION, SOLUTION SUBCUTANEOUS NIGHTLY
Qty: 4.5 ML | Refills: 11
Start: 2023-10-31 | End: 2023-11-30

## 2023-10-31 NOTE — PATIENT INSTRUCTIONS
Mounjaro, Ozempic, Trulicity, Rybelsus    Decrease insulin to 18 units.     If you start one of the above medications, the plan will be to wean off the insulin.

## 2023-10-31 NOTE — PROGRESS NOTES
"CC: Mr. Samuel Cast arrives today for management of Type 2 DM and review of chronic medical conditions, as listed in the Visit Diagnosis section of this encounter.       HPI: Mr. Samuel Cast was diagnosed with Type 2 DM in 2007, during workup of stroke. Initial treatment consisted of metformin, glimepiride. Insulin added in 2022, following hospital stay after subarachnoid hemorrhage. Denies FH of DM. Denies hospitalizations due to DM.   Of note, he has R hemiplegia from past CVA, as well as expressive aphasia.     He is accompanied by his wife, Charisse, who contributes to most of the history.     Patient was last seen by me in June. Insulin dose was decreased at that time.     BG readings are checked 2x/day.              Hypoglycemia: No    Missing Insulin/PO medication doses: Rare     Exercise: no formal     Dietary Habits: Eats 2-3 meals/day. May sleep in and have brunch instead. Occasional snack - SF cookie. Avoids sugary beverages.     Last DM education appointment:    They are planning on returning to [place] after the first of the year, which is where they are from and family still is.       CURRENT DIABETIC MEDS: metformin  mg BID, Toujeo 20 units QHS  Vial or pen: pen  Glucometer type: Relion    Previous DM treatments:  Metformin - abdominal cramping  Glimepiride   Novolog     Last Eye Exam: 7/2023, no DR  Last Podiatry Exam: 10/2023    REVIEW OF SYSTEMS  Constitutional: + fatigue, weakness. In wheelchair. + weight loss over the year  Cardiac: no palpitations or chest pain.  Respiratory: no cough or dyspnea.  GI: no c/o abdominal pain or nausea. Denies h/o pancreatitis.  Skin: no lesions or rashes.  Neuro: no numbness, tingling, or parasthesias.  Endocrine: denies polyphagia, polydipsia, polyuria      Personally reviewed Past Medical, Surgical, Social History.    Vital Signs  /80   Pulse 60   Ht 6' 4" (1.93 m)   Wt 100.7 kg (222 lb)   BMI 27.02 kg/m²     Personally reviewed the below " labs:    Hemoglobin A1C   Date Value Ref Range Status   10/24/2023 4.9 4.0 - 5.6 % Final     Comment:     ADA Screening Guidelines:  5.7-6.4%  Consistent with prediabetes  >or=6.5%  Consistent with diabetes    High levels of fetal hemoglobin interfere with the HbA1C  assay. Heterozygous hemoglobin variants (HbS, HgC, etc)do  not significantly interfere with this assay.   However, presence of multiple variants may affect accuracy.     06/20/2023 4.8 4.0 - 5.6 % Final     Comment:     ADA Screening Guidelines:  5.7-6.4%  Consistent with prediabetes  >or=6.5%  Consistent with diabetes    High levels of fetal hemoglobin interfere with the HbA1C  assay. Heterozygous hemoglobin variants (HbS, HgC, etc)do  not significantly interfere with this assay.   However, presence of multiple variants may affect accuracy.     01/19/2023 4.9 4.0 - 5.6 % Final     Comment:     ADA Screening Guidelines:  5.7-6.4%  Consistent with prediabetes  >or=6.5%  Consistent with diabetes    High levels of fetal hemoglobin interfere with the HbA1C  assay. Heterozygous hemoglobin variants (HbS, HgC, etc)do  not significantly interfere with this assay.   However, presence of multiple variants may affect accuracy.         Chemistry        Component Value Date/Time     10/24/2023 1337    K 4.1 10/24/2023 1337     10/24/2023 1337    CO2 22 (L) 10/24/2023 1337    BUN 18 10/24/2023 1337    CREATININE 0.8 10/24/2023 1337     (H) 10/24/2023 1337        Component Value Date/Time    CALCIUM 9.8 10/24/2023 1337    ALKPHOS 68 10/24/2023 1337    AST 24 10/24/2023 1337    ALT 27 10/24/2023 1337    BILITOT 1.1 (H) 10/24/2023 1337    ESTGFRAFRICA >60 05/15/2022 0757    EGFRNONAA >60 05/15/2022 0757          Lab Results   Component Value Date    CHOL 89 (L) 06/20/2023    CHOL 139 08/22/2022    CHOL 178 04/27/2022     Lab Results   Component Value Date    HDL 29 (L) 06/20/2023    HDL 30 (L) 08/22/2022    HDL 41 04/27/2022     Lab Results  "  Component Value Date    LDLCALC 44.2 (L) 06/20/2023    LDLCALC 83.0 08/22/2022    LDLCALC 104.0 04/27/2022     Lab Results   Component Value Date    TRIG 79 06/20/2023    TRIG 130 08/22/2022    TRIG 165 (H) 04/27/2022     Lab Results   Component Value Date    CHOLHDL 32.6 06/20/2023    CHOLHDL 21.6 08/22/2022    CHOLHDL 23.0 04/27/2022       Lab Results   Component Value Date    MICALBCREAT 4.2 06/20/2023     Lab Results   Component Value Date    TSH 2.250 08/21/2022       CrCl cannot be calculated (Patient's most recent lab result is older than the maximum 7 days allowed.).    No results found for: "[id number]"      PHYSICAL EXAMINATION  Constitutional: Appears well, no distress, in wheelchair  Respiratory: CTA, even and unlabored.  Cardiovascular: RRR, no murmurs, no carotid bruits.   GI: bowel sounds active, no hernia noted  Skin: warm and dry; no visible wounds  Neuro: expressive aphasia noted.   Feet: appropriate footwear.     Goals         80 <= Glucose <= 180 (pt-stated)       Blood Pressure < 130/80 (pt-stated)       Exercise at least 150 minutes per week.       HEMOGLOBIN A1C < 8.0       Take at least one BP reading per week at various times of the day               Assessment/Plan  1. Type 2 diabetes mellitus with hyperglycemia, without long-term current use of insulin  -- A1c tightly controlled but no hypoglycemia. Historically, glucoses on logs appear higher than A1c suggests. Discussed adding GLP-1RA in a effort to wean off of insulin. List of medications given to pt's wife and she will look into tiering/possible copay and notify me if they would like to try one.   -- decrease Toujeo to 18 units nightly  -- continue metformin  mg BID.   -- BG monitoring 1x/day, alternating times. Notify me if BG often  >150    -- Discussed diagnosis of DM, A1c goals, progression of disease, long term complications and tx options.    -- Reviewed hypoglycemia management: treat with 4 oz of juice, 4 oz regular " soda, or 4 glucose tablets. Monitor and repeat treatment every 15 minutes until BG is >70 Then have a snack, which includes 15 grams of complex carbohydrates and protein.     -- takes statin, ARB   2. Primary hypertension  -- controlled  -- continue current meds   3. History of cardioembolic cerebrovascular accident (CVA)  -- w/R sided hemiplegia  -- avoid hypoglycemia.    4. Seizure disorder  -- avoid hypoglycemia  -- follows with neurology       FOLLOW UP  Follow up in about 4 months (around 2/29/2024).   Patient instructed to bring BG logs to each follow up   Patient encouraged to call for any BG/medication issues, concerns, or questions.    Orders Placed This Encounter   Procedures    Hemoglobin A1C

## 2023-11-01 ENCOUNTER — TELEPHONE (OUTPATIENT)
Dept: ENDOCRINOLOGY | Facility: CLINIC | Age: 68
End: 2023-11-01
Payer: MEDICARE

## 2023-11-01 DIAGNOSIS — E11.65 TYPE 2 DIABETES MELLITUS WITH HYPERGLYCEMIA, WITHOUT LONG-TERM CURRENT USE OF INSULIN: ICD-10-CM

## 2023-11-01 DIAGNOSIS — E11.9 TYPE 2 DIABETES MELLITUS WITHOUT COMPLICATION, WITH LONG-TERM CURRENT USE OF INSULIN: Primary | ICD-10-CM

## 2023-11-01 DIAGNOSIS — Z79.4 TYPE 2 DIABETES MELLITUS WITHOUT COMPLICATION, WITH LONG-TERM CURRENT USE OF INSULIN: Primary | ICD-10-CM

## 2023-11-01 RX ORDER — TIRZEPATIDE 2.5 MG/.5ML
2.5 INJECTION, SOLUTION SUBCUTANEOUS
Qty: 4 PEN | Refills: 0 | Status: SHIPPED | OUTPATIENT
Start: 2023-11-01 | End: 2023-11-30

## 2023-11-01 NOTE — TELEPHONE ENCOUNTER
----- Message from Curtis Evans sent at 11/1/2023  9:30 AM CDT -----  Type: Needs Medical Advice  Who Called:  pt wife, Charisse   Symptoms (please be specific):  said she need to speak to the nurse--said she was given a list of 4 meds for him yesterday and she do not know which one to ask for--please call and advise  Best Call Back Number: [phone]  Additional Information: thank you

## 2023-11-01 NOTE — TELEPHONE ENCOUNTER
I'd recommend starting with Mounjaro. I will send 4 pens of the starter dose to [facility]. This is a non-therapeutic introduction dose for the first 4 weeks. Give the contents of 1 pen every 7 days. I'd recommend watching video on website to learn how to use the pen. Please notify me if having any significant nausea, diarrhea, constipation. Please let me know after last dose so I can send in the prescription for the therapeutic dose going forward.     Once he starts the first month of Mounjaro, decrease insulin dose to 10 units.     Once on the therapeutic dose of Mounjaro (5 mg weekly), stop the insulin and let me know if blood sugars are often over 150.

## 2023-11-01 NOTE — TELEPHONE ENCOUNTER
S/w wife. She said that  all of them;mounjaro,ozempic,trulicity and Rybelsus will cost them $45 and she said it is ok. She wants you to pick whichever you thing is best, pt prefers injection. Also, she wants instructions on how to wean pt off the insulin when he starts the new med

## 2023-11-03 NOTE — TELEPHONE ENCOUNTER
Spoke with patient's wife and notified her of Ginger's previous message and verbalized understanding

## 2023-11-30 ENCOUNTER — PATIENT MESSAGE (OUTPATIENT)
Dept: ENDOCRINOLOGY | Facility: CLINIC | Age: 68
End: 2023-11-30
Payer: MEDICARE

## 2023-11-30 DIAGNOSIS — Z79.4 TYPE 2 DIABETES MELLITUS WITHOUT COMPLICATION, WITH LONG-TERM CURRENT USE OF INSULIN: ICD-10-CM

## 2023-11-30 DIAGNOSIS — E11.65 TYPE 2 DIABETES MELLITUS WITH HYPERGLYCEMIA, WITHOUT LONG-TERM CURRENT USE OF INSULIN: Primary | ICD-10-CM

## 2023-11-30 DIAGNOSIS — E11.9 TYPE 2 DIABETES MELLITUS WITHOUT COMPLICATION, WITH LONG-TERM CURRENT USE OF INSULIN: ICD-10-CM

## 2023-11-30 RX ORDER — TIRZEPATIDE 5 MG/.5ML
5 INJECTION, SOLUTION SUBCUTANEOUS
Qty: 4 PEN | Refills: 6 | Status: SHIPPED | OUTPATIENT
Start: 2023-11-30 | End: 2024-01-11 | Stop reason: SINTOL

## 2023-12-14 ENCOUNTER — PATIENT OUTREACH (OUTPATIENT)
Dept: ADMINISTRATIVE | Facility: HOSPITAL | Age: 68
End: 2023-12-14
Payer: MEDICARE

## 2023-12-14 NOTE — PROGRESS NOTES
Population Health Chart Review & Patient Outreach Details    Outreach Performed: NO    Additional Pop Health Notes:    rx  Dec , Atorvastatin 80mg. Refill msg sent to pcp  [facility] Statin report         Health Maintenance Topics Overdue:    Health Maintenance Due   Topic Date Due    Aspirin/Antiplatelet Therapy  Never done    High Dose Statin  Never done    Shingles Vaccine (1 of 2) Never done    RSV Vaccine (Age 60+ and Pregnant patients) (1 - 1-dose 60+ series) Never done    Abdominal Aortic Aneurysm Screening  Never done    Influenza Vaccine (1) 2023    COVID-19 Vaccine ( - -24 season) 2023         Health Maintenance Topic(s) Outreach Outcomes & Actions Taken:

## 2023-12-16 RX ORDER — ATORVASTATIN CALCIUM 80 MG/1
80 TABLET, FILM COATED ORAL DAILY
Qty: 90 TABLET | Refills: 3 | Status: SHIPPED | OUTPATIENT
Start: 2023-12-16 | End: 2024-12-15

## 2024-01-03 ENCOUNTER — PATIENT OUTREACH (OUTPATIENT)
Dept: ADMINISTRATIVE | Facility: HOSPITAL | Age: 69
End: 2024-01-03
Payer: MEDICARE

## 2024-01-03 ENCOUNTER — TELEPHONE (OUTPATIENT)
Dept: ADMINISTRATIVE | Facility: HOSPITAL | Age: 69
End: 2024-01-03
Payer: MEDICARE

## 2024-01-03 ENCOUNTER — PATIENT MESSAGE (OUTPATIENT)
Dept: ADMINISTRATIVE | Facility: HOSPITAL | Age: 69
End: 2024-01-03
Payer: MEDICARE

## 2024-01-03 NOTE — PROGRESS NOTES
Population Health Chart Review & Patient Outreach Details      Further Action Needed If Patient Returns Outreach:      [facility]-COLONOSCOPY REPORT-NEED TO CONFIRM WHERE/WHICH PROVIDER THAT THE PATIENT HAD HIS COLONOSCOPY WITH IN .    Attempted to call patient, no answer, LVM. Message sent to patient's portal.        Updates Requested / Reviewed:     [x]  Care Everywhere    [x]     []  External Sources ([facility], [facility], [facility], etc.)    [] [facility]   [] [facility]   [] Other:    []  Care Team Updated   []  Removed  or Duplicate Orders   []  Immunization Reconciliation Completed / Queried    [] [place]   [] [place]   [] [place]   [] [place]      Health Maintenance Topics Addressed and Outreach Outcomes / Actions Taken:             Breast Cancer Screening []  Mammogram Order Placed    []  Mammogram Screening Scheduled    []  External Records Requested & Care Team Updated if Applicable    []  External Records Uploaded & Care Team Updated if Applicable    []  Pt Declined Scheduling Mammogram    []  Pt Will Schedule with External Provider / Order Routed & Care Team Updated if Applicable              Cervical Cancer Screening []  Pap Smear Scheduled in Primary Care or OBGYN    []  External Records Requested & Care Team Updated if Applicable       []  External Records Uploaded, Care Team Updated, & History Updated if Applicable    []  Patient Declined Scheduling Pap Smear    []  Patient Will Schedule with External Provider & Care Team Updated if Applicable                  Colorectal Cancer Screening []  Colonoscopy Case Request / Referral / Home Test Order Placed    []  External Records Requested & Care Team Updated if Applicable    []  External Records Uploaded, Care Team Updated, & History Updated if Applicable    []  Patient Declined Completing Colon Cancer Screening    []  Patient Will Schedule with External Provider & Care Team Updated if Applicable    []  Fit Kit Mailed (add the SmartPhrase under  additional notes)    []  Reminded Patient to Complete Home Test                Diabetic Eye Exam []  Eye Exam Screening Order Placed    []  Eye Camera Scheduled or Optometry/Ophthalmology Referral Placed    []  External Records Requested & Care Team Updated if Applicable    []  External Records Uploaded, Care Team Updated, & History Updated if Applicable    []  Patient Declined Scheduling Eye Exam    []  Patient Will Schedule with External Provider & Care Team Updated if Applicable             Blood Pressure Control []  Primary Care Follow Up Visit Scheduled     []  Remote Blood Pressure Reading Captured    []  Patient Declined Remote Reading or Scheduling Appt - Escalated to PCP    []  Patient Will Call Back or Send Portal Message with Reading                 HbA1c & Other Labs []  Overdue Lab(s) Ordered    []  Overdue Lab(s) Scheduled    []  External Records Uploaded & Care Team Updated if Applicable    []  Primary Care Follow Up Visit Scheduled     []  Reminded Patient to Complete A1c Home Test    []  Patient Declined Scheduling Labs or Will Call Back to Schedule    []  Patient Will Schedule with External Provider / Order Routed, & Care Team Updated if Applicable           Primary Care Appointment []  Primary Care Appt Scheduled    []  Patient Declined Scheduling or Will Call Back to Schedule    []  Pt Established with External Provider, Updated Care Team, & Informed Pt to Notify Payor if Applicable           Medication Adherence /    Statin Use []  Primary Care Appointment Scheduled    []  Patient Reminded to  Prescription    []  Patient Declined, Provider Notified if Needed    []  Sent Provider Message to Review to Evaluate Pt for Statin, Add Exclusion Dx Codes, Document   Exclusion in Problem List, Change Statin Intensity Level to Moderate or High Intensity if Applicable                Osteoporosis Screening []  Dexa Order Placed    []  Dexa Appointment Scheduled    []  External Records Requested & Care  Team Updated    []  External Records Uploaded, Care Team Updated, & History Updated if Applicable    []  Patient Declined Scheduling Dexa or Will Call Back to Schedule    []  Patient Will Schedule with External Provider / Order Routed & Care Team Updated if Applicable       Additional Notes:

## 2024-01-03 NOTE — TELEPHONE ENCOUNTER
[facility]-COLONOSCOPY REPORT-NEED TO CONFIRM WHERE/WHICH PROVIDER THAT THE PATIENT HAD HIS COLONOSCOPY WITH IN 2015        Attempted to call patient, no answer, LVM for pt to contact the clinic. Will send portal.

## 2024-01-07 DIAGNOSIS — E11.65 TYPE 2 DIABETES MELLITUS WITH HYPERGLYCEMIA, WITHOUT LONG-TERM CURRENT USE OF INSULIN: ICD-10-CM

## 2024-01-08 RX ORDER — METFORMIN HYDROCHLORIDE 750 MG/1
750 TABLET, EXTENDED RELEASE ORAL 2 TIMES DAILY WITH MEALS
Qty: 180 TABLET | Refills: 3 | Status: SHIPPED | OUTPATIENT
Start: 2024-01-08 | End: 2025-01-07

## 2024-01-11 ENCOUNTER — PATIENT MESSAGE (OUTPATIENT)
Dept: ENDOCRINOLOGY | Facility: CLINIC | Age: 69
End: 2024-01-11
Payer: MEDICARE

## 2024-01-11 DIAGNOSIS — E11.65 TYPE 2 DIABETES MELLITUS WITH HYPERGLYCEMIA, WITHOUT LONG-TERM CURRENT USE OF INSULIN: Primary | ICD-10-CM

## 2024-01-11 DIAGNOSIS — Z79.4 TYPE 2 DIABETES MELLITUS WITHOUT COMPLICATION, WITH LONG-TERM CURRENT USE OF INSULIN: ICD-10-CM

## 2024-01-11 DIAGNOSIS — E11.9 TYPE 2 DIABETES MELLITUS WITHOUT COMPLICATION, WITH LONG-TERM CURRENT USE OF INSULIN: ICD-10-CM

## 2024-01-12 ENCOUNTER — PATIENT MESSAGE (OUTPATIENT)
Dept: FAMILY MEDICINE | Facility: CLINIC | Age: 69
End: 2024-01-12
Payer: MEDICARE

## 2024-01-16 ENCOUNTER — OFFICE VISIT (OUTPATIENT)
Dept: OPTOMETRY | Facility: CLINIC | Age: 69
End: 2024-01-16
Payer: MEDICARE

## 2024-01-16 DIAGNOSIS — Z13.5 GLAUCOMA SCREENING: ICD-10-CM

## 2024-01-16 DIAGNOSIS — E11.36 CATARACT ASSOCIATED WITH TYPE 2 DIABETES MELLITUS: ICD-10-CM

## 2024-01-16 DIAGNOSIS — H35.033 HYPERTENSIVE RETINOPATHY OF BOTH EYES: ICD-10-CM

## 2024-01-16 DIAGNOSIS — E11.9 DIABETES MELLITUS TYPE 2 WITHOUT RETINOPATHY: Primary | ICD-10-CM

## 2024-01-16 DIAGNOSIS — H52.4 HYPEROPIA WITH ASTIGMATISM AND PRESBYOPIA, BILATERAL: ICD-10-CM

## 2024-01-16 DIAGNOSIS — H52.03 HYPEROPIA WITH ASTIGMATISM AND PRESBYOPIA, BILATERAL: ICD-10-CM

## 2024-01-16 DIAGNOSIS — H52.203 HYPEROPIA WITH ASTIGMATISM AND PRESBYOPIA, BILATERAL: ICD-10-CM

## 2024-01-16 PROCEDURE — 1126F AMNT PAIN NOTED NONE PRSNT: CPT | Mod: CPTII,S$GLB,, | Performed by: OPTOMETRIST

## 2024-01-16 PROCEDURE — 92014 COMPRE OPH EXAM EST PT 1/>: CPT | Mod: S$GLB,,, | Performed by: OPTOMETRIST

## 2024-01-16 PROCEDURE — 1159F MED LIST DOCD IN RCRD: CPT | Mod: CPTII,S$GLB,, | Performed by: OPTOMETRIST

## 2024-01-16 PROCEDURE — 2023F DILAT RTA XM W/O RTNOPTHY: CPT | Mod: CPTII,S$GLB,, | Performed by: OPTOMETRIST

## 2024-01-16 PROCEDURE — 1101F PT FALLS ASSESS-DOCD LE1/YR: CPT | Mod: CPTII,S$GLB,, | Performed by: OPTOMETRIST

## 2024-01-16 PROCEDURE — 99999 PR PBB SHADOW E&M-EST. PATIENT-LVL III: CPT | Mod: PBBFAC,,, | Performed by: OPTOMETRIST

## 2024-01-16 PROCEDURE — 3288F FALL RISK ASSESSMENT DOCD: CPT | Mod: CPTII,S$GLB,, | Performed by: OPTOMETRIST

## 2024-01-16 PROCEDURE — 92134 CPTRZ OPH DX IMG PST SGM RTA: CPT | Mod: S$GLB,,, | Performed by: OPTOMETRIST

## 2024-01-16 PROCEDURE — 4010F ACE/ARB THERAPY RXD/TAKEN: CPT | Mod: CPTII,S$GLB,, | Performed by: OPTOMETRIST

## 2024-01-16 NOTE — PROGRESS NOTES
HPI    Pt here for complete DM exam with no complaints at this time. Pt denies   changes to [facility]. Denies flashes and floaters. States diabetes is well   controlled with current medications. Will be starting Jardiance tomorrow.   Hemoglobin A1C       Date                     Value               Ref Range             Status                10/24/2023               4.9                 4.0 - 5.6 %           Final              Comment:    ADA Screening Guidelines:  5.7-6.4%  Consistent with   prediabetes  >or=6.5%  Consistent with diabetes    High levels of fetal   hemoglobin interfere with the HbA1C  assay. Heterozygous hemoglobin   variants (HbS, HgC, etc)do  not significantly interfere with this assay.     However, presence of multiple variants may affect accuracy.         06/20/2023               4.8                 4.0 - 5.6 %           Final              Comment:    ADA Screening Guidelines:  5.7-6.4%  Consistent with   prediabetes  >or=6.5%  Consistent with diabetes    High levels of fetal   hemoglobin interfere with the HbA1C  assay. Heterozygous hemoglobin   variants (HbS, HgC, etc)do  not significantly interfere with this assay.     However, presence of multiple variants may affect accuracy.         01/19/2023               4.9                 4.0 - 5.6 %           Final              Comment:    ADA Screening Guidelines:  5.7-6.4%  Consistent with   prediabetes  >or=6.5%  Consistent with diabetes    High levels of fetal   hemoglobin interfere with the HbA1C  assay. Heterozygous hemoglobin   variants (HbS, HgC, etc)do  not significantly interfere with this assay.     However, presence of multiple variants may affect accuracy.    ----------   Last edited by Leni Abbott on 1/16/2024  1:18 PM.            Assessment /Plan     For exam results, see Encounter Report.    Diabetes mellitus type 2 without retinopathy  -     Posterior Segment OCT Retina-Both eyes    Hypertensive retinopathy of both eyes  -      Posterior Segment OCT Retina-Both eyes    Cataract associated with type 2 diabetes mellitus    Glaucoma screening    Hyperopia with astigmatism and presbyopia, bilateral      1,2.   No gross BDR --no DME   OCT 1/2024   No BDR noted   OD w/ area of thinning inf/ temp to fovea ---could coincide w/ old heme 2021, ? small brao   Repeat annually     Tight control glucose / BP     3.   Early vis sig NS, not ready for consult, gave info  4.   Not suspect   5.   Updaeted, but no changes, advised continue with current specs     Discussed and educated patient on current findings /plan.  RTC 1 year, prn if any changes / issues

## 2024-01-16 NOTE — PATIENT INSTRUCTIONS
"DRY EYES -- BURNING OR CARRINGTON SYMPTOMS:  Use Over The Counter artificial tears as needed for dry eye symptoms.   Some common brands include:  Systane, Optive, Refresh, and Thera-Tears.  These drops can be used as frequently as desired, but may be most helpful use during long periods of concentrated work.  For example, reading / working at the computer. Start with 3-4x per day.     Nighttime Ophthalmic gel or ointments are available: Refresh PM, Genteal, and Lacrilube.    Avoid drops that "get redness out" (Visine, Murine, Clear Eyes), as these may contain medication that could further irritate the eyes, especially with chronic use.    ALLERGY EYES -- ITCHING SYMPTOMS:  Over the counter medications include--Pataday, Zaditor, and Alaway.  Use as directed 1-2 drops daily for symptoms of itching / watering eyes.  These drops will not help for dry eye or exposure symptoms.    REDNESS RELIEF:  Lumify---is a good redness reliever that will not cause irritation if used chronically.       FLASHES / FLOATERS / POSTERIOR VITREOUS DETACHMENT    Call the clinic if you have any further changes in symptoms.  Including:  Increased numbers of floaters or flashing lights, dimness or darkness that moves through or stays constant in your vision, or any pain in the eye (s).    You may sometimes see small specks or clouds moving in your field of vision.  They are called FLOATERS.  You can often see them when looking at a plain background, like a blank wall or blue tremayne.  Floaters are actually tiny clumps of gel or cells inside the VITREOUS, the clear jelly-like fluid that fills the inside of your eye.    While these objects look like they are in front of your eye, they are actually floating inside.  What you see are the shadows they cast on the RETINA, the nerve layer at the back of the eye that senses light and allows you to see.      POSTERIOR VITREOUS DETACHMENT    The appearance of new floaters may be alarming.  If you suddenly develop " new floaters, you should contact your eye care professional  right away.    The retina can tear if the shrinking vitreous pulls away from the wall of the eye.  This sometimes causes a small amount of bleeding in the eye that may appear as new floaters.    A torn retina is always a serious problem, since it can lead to a retinal detachment.  You should see your eye care professional as soon as possible if:    even one new floater appears suddenly;  you see sudden flashes of light;  you notice other symptoms, like the loss of side vision, or a curtain closes down in your vision        POSTERIOR VITREOUS DETACHMENT is more common for people who:    are nearsighted;  have had cataract surgery;  have had YAG laser surgery of the eye;  have had inflammation inside the eye;  are over age 60.      While some floaters may remain visible, many of them will fade over time and become less noticeable.  Even if you've had some floaters for years, you should have your eyes checked as soon as possible if you notice new ones.    FLASHING LIGHTS    When the vitreous gel rubs or pulls on the retina, you may see what look like flashing lights or lightning streaks.  These flashes can appear off and on for several weeks or months.      Some people experience flashes of light that appear as jagged lines or heat waves in both eyes, lasting 10-20 minutes.  These flashes are caused by a spasm of blood vessels in the brain, which is called a migraine.    If a headache follows these flashes, it's called a migraine headache.  If   no headache occurs, these flashes are called Ophthalmic or Ocular Migraine.          DIABETES AND THE EYE / DIABETIC RETINOPATHY    Diabetic retinopathy is a condition occurring in persons with diabetes, which causes progressive damage to the retina, the light sensitive lining at the back of the eye. It is a serious sight-threatening complication of diabetes.    Diabetic retinopathy is the result of damage to the  tiny blood vessels that nourish the retina. They leak blood and other fluids that cause swelling of retinal tissue and clouding of vision. The condition usually affects both eyes. The longer a person has diabetes, the more likely they will develop diabetic retinopathy. If left untreated, diabetic retinopathy can cause blindness.  There are two basic types of diabetic retinopathy:    Background or nonproliferative diabetic retinopathy (NPDR)  Nonproliferative diabetic retinopathy (NPDR) is the earliest stage of diabetic retinopathy. With this condition, damaged blood vessels in the retina begin to leak extra fluid and small amounts of blood into the eye. Sometimes, deposits of cholesterol or other fats from the blood may leak into the retina. Many people with diabetes have mild NPDR, which usually does not affect their vision. However, if their vision is affected, it is the result of macular edema and macular ischemia.    If vision is affected due to macular changes, a consult with a Retina Specialist may be advised.  This is an ophthalmologist that treats retina conditions, including diabetic retinopathy.     Proliferative diabetic retinopathy (PDR)  Proliferative diabetic retinopathy (PDR) mainly occurs when many of the blood vessels in the retina close, preventing enough blood flow. In an attempt to supply blood to the area where the original vessels closed, the retina responds by growing new blood vessels. This is called neovascularization. However, these new blood vessels are abnormal and do not supply the retina with proper blood flow. The new vessels are also often accompanied by scar tissue that may cause the retina to wrinkle or detach. PDR may cause more severe vision loss than NPDR because it can affect both central and peripheral vision.     A patient diagnosed with proliferative diabetic eye disease will be referred to a retinal specialist for consultation.    Often there are no visual symptoms in the  early stages of diabetic retinopathy. That is why our eye care professionals recommend that everyone with diabetes have a comprehensive dilated eye examination once a year. Early detection and treatment can limit the potential for significant vision loss from diabetic retinopathy.      Using the Amsler Grid  If you are at risk for vision loss, you may be told to check your eyesight regularly using the Amsler grid. Below is the grid and instructions for using it.         The Amsler grid helps you track changes in your vision.    How to Use the Amsler Grid  Use the grid in a well-lighted area.  Wear glasses or contact lenses if you usually wear them.  Hold the grid at your normal reading distance (about 16 inches).  Cover your left eye.  With your right eye, look at the dot in the center of the grid.  While looking at the dot, notice if any of the lines look wavy, if any lines disappear, or if the boxes change shape.  Write down on a piece of paper any vision changes from the last time you used the grid.  Now repeat the exercise, this time covering your right eye.  Call your doctor right away if you notice any vision changes.  How Often Should I Check My Vision?  Use the Amsler grid as often as your eye doctor suggests. Keep the grid where youll remember to use it. Call your eye doctor right away if you notice any changes with your eyesight. This includes if your vision improves.  © 7448-1610 [employer], [address] [place] [address], [place] [address]. All rights reserved. This information is not intended as a substitute for professional medical care. Always follow your healthcare professional's instructions.

## 2024-01-17 ENCOUNTER — OFFICE VISIT (OUTPATIENT)
Dept: FAMILY MEDICINE | Facility: CLINIC | Age: 69
End: 2024-01-17
Payer: MEDICARE

## 2024-01-17 VITALS
SYSTOLIC BLOOD PRESSURE: 120 MMHG | HEIGHT: 76 IN | DIASTOLIC BLOOD PRESSURE: 62 MMHG | HEART RATE: 80 BPM | BODY MASS INDEX: 27.03 KG/M2 | WEIGHT: 222 LBS | OXYGEN SATURATION: 99 %

## 2024-01-17 DIAGNOSIS — Z79.4 TYPE 2 DIABETES MELLITUS WITH HYPERGLYCEMIA, WITH LONG-TERM CURRENT USE OF INSULIN: ICD-10-CM

## 2024-01-17 DIAGNOSIS — I10 PRIMARY HYPERTENSION: ICD-10-CM

## 2024-01-17 DIAGNOSIS — E11.65 TYPE 2 DIABETES MELLITUS WITH HYPERGLYCEMIA, WITH LONG-TERM CURRENT USE OF INSULIN: ICD-10-CM

## 2024-01-17 DIAGNOSIS — I63.411 CEREBROVASCULAR ACCIDENT (CVA) DUE TO EMBOLISM OF RIGHT MIDDLE CEREBRAL ARTERY: ICD-10-CM

## 2024-01-17 DIAGNOSIS — Z23 NEED FOR VACCINATION: ICD-10-CM

## 2024-01-17 DIAGNOSIS — I10 ESSENTIAL HYPERTENSION: Primary | ICD-10-CM

## 2024-01-17 PROCEDURE — 3074F SYST BP LT 130 MM HG: CPT | Mod: CPTII,S$GLB,, | Performed by: INTERNAL MEDICINE

## 2024-01-17 PROCEDURE — 99214 OFFICE O/P EST MOD 30 MIN: CPT | Mod: 25,S$GLB,, | Performed by: INTERNAL MEDICINE

## 2024-01-17 PROCEDURE — 3008F BODY MASS INDEX DOCD: CPT | Mod: CPTII,S$GLB,, | Performed by: INTERNAL MEDICINE

## 2024-01-17 PROCEDURE — 90694 VACC AIIV4 NO PRSRV 0.5ML IM: CPT | Mod: S$GLB,,, | Performed by: INTERNAL MEDICINE

## 2024-01-17 PROCEDURE — G0008 ADMIN INFLUENZA VIRUS VAC: HCPCS | Mod: S$GLB,,, | Performed by: INTERNAL MEDICINE

## 2024-01-17 PROCEDURE — 1101F PT FALLS ASSESS-DOCD LE1/YR: CPT | Mod: CPTII,S$GLB,, | Performed by: INTERNAL MEDICINE

## 2024-01-17 PROCEDURE — 3078F DIAST BP <80 MM HG: CPT | Mod: CPTII,S$GLB,, | Performed by: INTERNAL MEDICINE

## 2024-01-17 PROCEDURE — 1126F AMNT PAIN NOTED NONE PRSNT: CPT | Mod: CPTII,S$GLB,, | Performed by: INTERNAL MEDICINE

## 2024-01-17 PROCEDURE — 99999 PR PBB SHADOW E&M-EST. PATIENT-LVL IV: CPT | Mod: PBBFAC,,, | Performed by: INTERNAL MEDICINE

## 2024-01-17 PROCEDURE — 1159F MED LIST DOCD IN RCRD: CPT | Mod: CPTII,S$GLB,, | Performed by: INTERNAL MEDICINE

## 2024-01-17 PROCEDURE — 3288F FALL RISK ASSESSMENT DOCD: CPT | Mod: CPTII,S$GLB,, | Performed by: INTERNAL MEDICINE

## 2024-01-17 PROCEDURE — 4010F ACE/ARB THERAPY RXD/TAKEN: CPT | Mod: CPTII,S$GLB,, | Performed by: INTERNAL MEDICINE

## 2024-01-17 NOTE — PROGRESS NOTES
Subjective     Patient ID: Samuel Cast is a 68 y.o. male.    Chief Complaint: Follow-up (Needs to renew handicap tag)    Pt here for check up    Dm- stable on medication  Htn- stable  Depression-s table on zoloft    Needs new handicap form        Follow-up  Pertinent negatives include no abdominal pain, chest pain, coughing, fatigue, fever or headaches.   Review of Systems   Constitutional:  Negative for fatigue and fever.   Respiratory:  Negative for cough and shortness of breath.    Cardiovascular:  Negative for chest pain and palpitations.   Gastrointestinal:  Negative for abdominal pain.   Musculoskeletal:  Negative for back pain.   Neurological:  Negative for headaches.          Objective     Physical Exam  Constitutional:       Appearance: Normal appearance.   HENT:      Head: Normocephalic.   Cardiovascular:      Rate and Rhythm: Normal rate and regular rhythm.   Pulmonary:      Effort: Pulmonary effort is normal.      Breath sounds: Normal breath sounds.   Musculoskeletal:         General: Normal range of motion.      Cervical back: Normal range of motion.   Neurological:      Mental Status: He is alert. Mental status is at baseline.   Psychiatric:         Mood and Affect: Mood normal.         Behavior: Behavior normal.            Assessment and Plan     1. Essential hypertension    2. Primary hypertension    3. Type 2 diabetes mellitus with hyperglycemia, with long-term current use of insulin    4. Cerebrovascular accident (CVA) due to embolism of right middle cerebral artery        Dm- stable  Htn-s table  Hx cva- doing well. On appropriate med  Filled out form           No follow-ups on file.

## 2024-01-24 RX ORDER — LEVETIRACETAM 750 MG/1
750 TABLET ORAL 2 TIMES DAILY
Qty: 180 TABLET | Refills: 3 | Status: SHIPPED | OUTPATIENT
Start: 2024-01-24 | End: 2025-01-23

## 2024-02-22 ENCOUNTER — LAB VISIT (OUTPATIENT)
Dept: LAB | Facility: HOSPITAL | Age: 69
End: 2024-02-22
Payer: MEDICARE

## 2024-02-22 DIAGNOSIS — E11.65 TYPE 2 DIABETES MELLITUS WITH HYPERGLYCEMIA, WITHOUT LONG-TERM CURRENT USE OF INSULIN: ICD-10-CM

## 2024-02-22 LAB
ESTIMATED AVG GLUCOSE: 91 MG/DL (ref 68–131)
HBA1C MFR BLD: 4.8 % (ref 4–5.6)

## 2024-02-22 PROCEDURE — 83036 HEMOGLOBIN GLYCOSYLATED A1C: CPT | Performed by: NURSE PRACTITIONER

## 2024-02-22 PROCEDURE — 36415 COLL VENOUS BLD VENIPUNCTURE: CPT | Mod: PO | Performed by: NURSE PRACTITIONER

## 2024-02-27 ENCOUNTER — OFFICE VISIT (OUTPATIENT)
Dept: PODIATRY | Facility: CLINIC | Age: 69
End: 2024-02-27
Payer: MEDICARE

## 2024-02-27 VITALS — BODY MASS INDEX: 27.03 KG/M2 | WEIGHT: 222 LBS | HEIGHT: 76 IN

## 2024-02-27 DIAGNOSIS — E11.9 TYPE 2 DIABETES MELLITUS WITHOUT COMPLICATION, WITHOUT LONG-TERM CURRENT USE OF INSULIN: Primary | ICD-10-CM

## 2024-02-27 DIAGNOSIS — B35.1 ONYCHOMYCOSIS DUE TO DERMATOPHYTE: ICD-10-CM

## 2024-02-27 DIAGNOSIS — M21.371 ACQUIRED RIGHT FOOT DROP: ICD-10-CM

## 2024-02-27 DIAGNOSIS — E11.51 TYPE II DIABETES MELLITUS WITH PERIPHERAL CIRCULATORY DISORDER: ICD-10-CM

## 2024-02-27 PROCEDURE — 1126F AMNT PAIN NOTED NONE PRSNT: CPT | Mod: CPTII,S$GLB,, | Performed by: PODIATRIST

## 2024-02-27 PROCEDURE — 3008F BODY MASS INDEX DOCD: CPT | Mod: CPTII,S$GLB,, | Performed by: PODIATRIST

## 2024-02-27 PROCEDURE — 3288F FALL RISK ASSESSMENT DOCD: CPT | Mod: CPTII,S$GLB,, | Performed by: PODIATRIST

## 2024-02-27 PROCEDURE — 3044F HG A1C LEVEL LT 7.0%: CPT | Mod: CPTII,S$GLB,, | Performed by: PODIATRIST

## 2024-02-27 PROCEDURE — 4010F ACE/ARB THERAPY RXD/TAKEN: CPT | Mod: CPTII,S$GLB,, | Performed by: PODIATRIST

## 2024-02-27 PROCEDURE — 1159F MED LIST DOCD IN RCRD: CPT | Mod: CPTII,S$GLB,, | Performed by: PODIATRIST

## 2024-02-27 PROCEDURE — 99213 OFFICE O/P EST LOW 20 MIN: CPT | Mod: 25,S$GLB,, | Performed by: PODIATRIST

## 2024-02-27 PROCEDURE — 99999 PR PBB SHADOW E&M-EST. PATIENT-LVL III: CPT | Mod: PBBFAC,,, | Performed by: PODIATRIST

## 2024-02-27 PROCEDURE — 1160F RVW MEDS BY RX/DR IN RCRD: CPT | Mod: CPTII,S$GLB,, | Performed by: PODIATRIST

## 2024-02-27 PROCEDURE — 1101F PT FALLS ASSESS-DOCD LE1/YR: CPT | Mod: CPTII,S$GLB,, | Performed by: PODIATRIST

## 2024-02-27 PROCEDURE — 11721 DEBRIDE NAIL 6 OR MORE: CPT | Mod: Q9,S$GLB,, | Performed by: PODIATRIST

## 2024-02-27 NOTE — PROGRESS NOTES
Subjective:      Patient ID: Samuel Cast is a 68 y.o. male.    Chief Complaint: Diabetic Foot Exam (PCP 1/17/24) and Nail Care      Samuel is a 68 y.o. male who presents to the clinic upon referral from Dr. Evelia yang. provider found  for evaluation and treatment of diabetic feet. Samuel has a past medical history of Diabetes mellitus, type 2, Hypertension, Seizures, and Stroke (07/2007). Patient relates no major problem with feet. Only complaints today consist of nails needing debridement also here for annual diabetic foot exam    PCP: Nallely Jurado MD    Date Last Seen by PCP: 1/17/24    Current shoe gear: Casual shoes    Hemoglobin A1C   Date Value Ref Range Status   02/22/2024 4.8 4.0 - 5.6 % Final     Comment:     ADA Screening Guidelines:  5.7-6.4%  Consistent with prediabetes  >or=6.5%  Consistent with diabetes    High levels of fetal hemoglobin interfere with the HbA1C  assay. Heterozygous hemoglobin variants (HbS, HgC, etc)do  not significantly interfere with this assay.   However, presence of multiple variants may affect accuracy.     10/24/2023 4.9 4.0 - 5.6 % Final     Comment:     ADA Screening Guidelines:  5.7-6.4%  Consistent with prediabetes  >or=6.5%  Consistent with diabetes    High levels of fetal hemoglobin interfere with the HbA1C  assay. Heterozygous hemoglobin variants (HbS, HgC, etc)do  not significantly interfere with this assay.   However, presence of multiple variants may affect accuracy.     06/20/2023 4.8 4.0 - 5.6 % Final     Comment:     ADA Screening Guidelines:  5.7-6.4%  Consistent with prediabetes  >or=6.5%  Consistent with diabetes    High levels of fetal hemoglobin interfere with the HbA1C  assay. Heterozygous hemoglobin variants (HbS, HgC, etc)do  not significantly interfere with this assay.   However, presence of multiple variants may affect accuracy.             Review of Systems   Constitutional: Negative for chills and fever.   Cardiovascular:  Positive for leg swelling.  Negative for claudication.   Respiratory:  Negative for shortness of breath.    Skin:  Positive for nail changes. Negative for itching and rash.   Musculoskeletal:  Negative for muscle cramps, muscle weakness and myalgias.   Gastrointestinal:  Negative for nausea and vomiting.   Neurological:  Negative for focal weakness, loss of balance, numbness and paresthesias.           Objective:      Physical Exam  Constitutional:       General: He is not in acute distress.     Appearance: He is well-developed. He is not diaphoretic.   Cardiovascular:      Pulses:           Dorsalis pedis pulses are 2+ on the right side and 2+ on the left side.        Posterior tibial pulses are 2+ on the right side and 2+ on the left side.      Comments: < 3 sec capillary refill time to toes 1-5 bilateral. Toes and feet are warm to touch proximally with normal distal cooling b/l. There is no hair growth on the feet and toes b/l. There is edema b/l. No spider veins or varicosities present b/l.     Musculoskeletal:      Comments: Equinus noted b/l ankles with < 10 deg DF noted. Right sided muscle weakness to PF, DF, eversion and inversion 3-4/5. Passive range of motion of ankle and pedal joints is painless b/l.    RIght great toe dorsal spurring noted at the first MTPJ with limited ROM at the joint   Skin:     General: Skin is warm and dry.      Coloration: Skin is not pale.      Findings: No abrasion, bruising, burn, ecchymosis, erythema, laceration, lesion, petechiae or rash.      Nails: There is no clubbing.      Comments: Skin is thin and atrophic bilateral    Nails 1-5 bilateral are thick 3-4 mm, long 5-15 mm, and discolored with subungual debris       Neurological:      Mental Status: He is alert and oriented to person, place, and time.      Sensory: No sensory deficit.      Motor: No tremor, atrophy or abnormal muscle tone.      Comments: Negative tinel sign bilateral.   Psychiatric:         Behavior: Behavior normal.                Assessment:       Encounter Diagnoses   Name Primary?    Type 2 diabetes mellitus without complication, without long-term current use of insulin Yes    Type II diabetes mellitus with peripheral circulatory disorder     Onychomycosis due to dermatophyte     Acquired right foot drop                  Plan:       Samuel was seen today for diabetic foot exam and nail care.    Diagnoses and all orders for this visit:    Type 2 diabetes mellitus without complication, without long-term current use of insulin    Type II diabetes mellitus with peripheral circulatory disorder    Onychomycosis due to dermatophyte    Acquired right foot drop            I counseled the patient on his conditions, their implications and medical management.    Shoe inspection. Diabetic Foot Education. Patient reminded of the importance of good nutrition and blood sugar control to help prevent podiatric complications of diabetes. Patient instructed on proper foot hygeine. We discussed wearing proper shoe gear, daily foot inspections, never walking without protective shoe gear, never putting sharp instruments to feet     With patient's verbal consent, nails were aggressively reduced and debrided x 10 to their soft tissue attachment mechanically and with electric , removing all offending nail and debris. Patient relates relief following the procedure. No anesthesia or hemostasis required. No blood loss.       - Return for routine care exam    Nikita Martínez DPM

## 2024-02-29 ENCOUNTER — OFFICE VISIT (OUTPATIENT)
Dept: ENDOCRINOLOGY | Facility: CLINIC | Age: 69
End: 2024-02-29
Payer: MEDICARE

## 2024-02-29 ENCOUNTER — TELEPHONE (OUTPATIENT)
Dept: ENDOCRINOLOGY | Facility: CLINIC | Age: 69
End: 2024-02-29

## 2024-02-29 ENCOUNTER — TELEPHONE (OUTPATIENT)
Dept: PHARMACY | Facility: CLINIC | Age: 69
End: 2024-02-29
Payer: MEDICARE

## 2024-02-29 VITALS
SYSTOLIC BLOOD PRESSURE: 122 MMHG | HEART RATE: 59 BPM | HEIGHT: 76 IN | WEIGHT: 199.94 LBS | BODY MASS INDEX: 24.35 KG/M2 | DIASTOLIC BLOOD PRESSURE: 70 MMHG

## 2024-02-29 DIAGNOSIS — I10 PRIMARY HYPERTENSION: ICD-10-CM

## 2024-02-29 DIAGNOSIS — Z79.4 TYPE 2 DIABETES MELLITUS WITHOUT COMPLICATION, WITH LONG-TERM CURRENT USE OF INSULIN: Primary | ICD-10-CM

## 2024-02-29 DIAGNOSIS — Z86.73 HISTORY OF CARDIOEMBOLIC CEREBROVASCULAR ACCIDENT (CVA): ICD-10-CM

## 2024-02-29 DIAGNOSIS — G40.909 SEIZURE DISORDER: ICD-10-CM

## 2024-02-29 DIAGNOSIS — E11.9 TYPE 2 DIABETES MELLITUS WITHOUT COMPLICATION, WITH LONG-TERM CURRENT USE OF INSULIN: Primary | ICD-10-CM

## 2024-02-29 PROCEDURE — 99214 OFFICE O/P EST MOD 30 MIN: CPT | Mod: S$GLB,,, | Performed by: NURSE PRACTITIONER

## 2024-02-29 PROCEDURE — 1126F AMNT PAIN NOTED NONE PRSNT: CPT | Mod: CPTII,S$GLB,, | Performed by: NURSE PRACTITIONER

## 2024-02-29 PROCEDURE — 99999 PR PBB SHADOW E&M-EST. PATIENT-LVL IV: CPT | Mod: PBBFAC,,, | Performed by: NURSE PRACTITIONER

## 2024-02-29 PROCEDURE — 1159F MED LIST DOCD IN RCRD: CPT | Mod: CPTII,S$GLB,, | Performed by: NURSE PRACTITIONER

## 2024-02-29 PROCEDURE — 1101F PT FALLS ASSESS-DOCD LE1/YR: CPT | Mod: CPTII,S$GLB,, | Performed by: NURSE PRACTITIONER

## 2024-02-29 PROCEDURE — 3288F FALL RISK ASSESSMENT DOCD: CPT | Mod: CPTII,S$GLB,, | Performed by: NURSE PRACTITIONER

## 2024-02-29 PROCEDURE — 3044F HG A1C LEVEL LT 7.0%: CPT | Mod: CPTII,S$GLB,, | Performed by: NURSE PRACTITIONER

## 2024-02-29 PROCEDURE — 4010F ACE/ARB THERAPY RXD/TAKEN: CPT | Mod: CPTII,S$GLB,, | Performed by: NURSE PRACTITIONER

## 2024-02-29 PROCEDURE — 3078F DIAST BP <80 MM HG: CPT | Mod: CPTII,S$GLB,, | Performed by: NURSE PRACTITIONER

## 2024-02-29 PROCEDURE — 1160F RVW MEDS BY RX/DR IN RCRD: CPT | Mod: CPTII,S$GLB,, | Performed by: NURSE PRACTITIONER

## 2024-02-29 PROCEDURE — 3074F SYST BP LT 130 MM HG: CPT | Mod: CPTII,S$GLB,, | Performed by: NURSE PRACTITIONER

## 2024-02-29 PROCEDURE — 3008F BODY MASS INDEX DOCD: CPT | Mod: CPTII,S$GLB,, | Performed by: NURSE PRACTITIONER

## 2024-02-29 NOTE — PROGRESS NOTES
CC: Mr. Samuel Cast arrives today for management of Type 2 DM and review of chronic medical conditions, as listed in the Visit Diagnosis section of this encounter.       HPI: Mr. Samuel Cast was diagnosed with Type 2 DM in 2007, during workup of stroke. Initial treatment consisted of metformin, glimepiride. Insulin added in 2022, following hospital stay after subarachnoid hemorrhage. Denies FH of DM. Denies hospitalizations due to DM.   Of note, he has R hemiplegia from past CVA, as well as expressive aphasia.     He is accompanied by his wife, Charisse, who contributes to most of the history.     Patient was last seen by me in October. Since then, Mounjaro was added and Toujeo discontinued. He couldn't tolerate Mounajro, due to GI side effects so Jardiance was added.     He is tolerating well. Was in the donut hole at end of last year and it was expensive then.     BG readings are checked 2x/day.              Hypoglycemia: No    Missing Insulin/PO medication doses: Rare     Exercise: no formal     Dietary Habits: Eats 3 meals/day. Lunch is lightest meal. Occasional snack - SF cookie or banana. Avoids sugary beverages.     Last DM education appointment:    They are planning on returning to [place], which is where they are from and family still is.       CURRENT DIABETIC MEDS: metformin  mg BID, Jardiance 10 mg daily  Vial or pen: pen  Glucometer type: Relion    Previous DM treatments:  Metformin - abdominal cramping  Glimepiride   Novolog   Toujeo   Mounjaro -- diarrhea, headaches       Last Eye Exam: 1/2024, no DR  Last Podiatry Exam: 2/2024    REVIEW OF SYSTEMS  Constitutional: + fatigue, weakness. In wheelchair. + weight loss over the year  Cardiac: no palpitations or chest pain.  Respiratory: no cough or dyspnea.  GI: no c/o abdominal pain or nausea. Denies h/o pancreatitis.  Skin: no lesions or rashes.  Neuro: no numbness, tingling, or parasthesias.  Endocrine: denies polyphagia, polydipsia,  "polyuria      Personally reviewed Past Medical, Surgical, Social History.    Vital Signs  /70   Pulse (!) 59   Ht 6' 4" (1.93 m)   Wt 90.7 kg (199 lb 15.3 oz)   BMI 24.34 kg/m²     Personally reviewed the below labs:    Hemoglobin A1C   Date Value Ref Range Status   02/22/2024 4.8 4.0 - 5.6 % Final     Comment:     ADA Screening Guidelines:  5.7-6.4%  Consistent with prediabetes  >or=6.5%  Consistent with diabetes    High levels of fetal hemoglobin interfere with the HbA1C  assay. Heterozygous hemoglobin variants (HbS, HgC, etc)do  not significantly interfere with this assay.   However, presence of multiple variants may affect accuracy.     10/24/2023 4.9 4.0 - 5.6 % Final     Comment:     ADA Screening Guidelines:  5.7-6.4%  Consistent with prediabetes  >or=6.5%  Consistent with diabetes    High levels of fetal hemoglobin interfere with the HbA1C  assay. Heterozygous hemoglobin variants (HbS, HgC, etc)do  not significantly interfere with this assay.   However, presence of multiple variants may affect accuracy.     06/20/2023 4.8 4.0 - 5.6 % Final     Comment:     ADA Screening Guidelines:  5.7-6.4%  Consistent with prediabetes  >or=6.5%  Consistent with diabetes    High levels of fetal hemoglobin interfere with the HbA1C  assay. Heterozygous hemoglobin variants (HbS, HgC, etc)do  not significantly interfere with this assay.   However, presence of multiple variants may affect accuracy.         Chemistry        Component Value Date/Time     10/24/2023 1337    K 4.1 10/24/2023 1337     10/24/2023 1337    CO2 22 (L) 10/24/2023 1337    BUN 18 10/24/2023 1337    CREATININE 0.8 10/24/2023 1337     (H) 10/24/2023 1337        Component Value Date/Time    CALCIUM 9.8 10/24/2023 1337    ALKPHOS 68 10/24/2023 1337    AST 24 10/24/2023 1337    ALT 27 10/24/2023 1337    BILITOT 1.1 (H) 10/24/2023 1337    ESTGFRAFRICA >60 05/15/2022 0757    EGFRNONAA >60 05/15/2022 0757          Lab Results " "  Component Value Date    CHOL 89 (L) 06/20/2023    CHOL 139 08/22/2022    CHOL 178 04/27/2022     Lab Results   Component Value Date    HDL 29 (L) 06/20/2023    HDL 30 (L) 08/22/2022    HDL 41 04/27/2022     Lab Results   Component Value Date    LDLCALC 44.2 (L) 06/20/2023    LDLCALC 83.0 08/22/2022    LDLCALC 104.0 04/27/2022     Lab Results   Component Value Date    TRIG 79 06/20/2023    TRIG 130 08/22/2022    TRIG 165 (H) 04/27/2022     Lab Results   Component Value Date    CHOLHDL 32.6 06/20/2023    CHOLHDL 21.6 08/22/2022    CHOLHDL 23.0 04/27/2022       Lab Results   Component Value Date    MICALBCREAT 4.2 06/20/2023     Lab Results   Component Value Date    TSH 2.250 08/21/2022       CrCl cannot be calculated (Patient's most recent lab result is older than the maximum 7 days allowed.).    No results found for: "[id number]"      PHYSICAL EXAMINATION  Constitutional: Appears well, no distress, in wheelchair  Respiratory: CTA, even and unlabored.  Cardiovascular: RRR, no murmurs, no carotid bruits.   GI: bowel sounds active, no hernia noted  Skin: warm and dry; no visible wounds  Neuro: expressive aphasia noted.   Feet: appropriate footwear.     Goals         80 <= Glucose <= 180 (pt-stated)       Blood Pressure < 130/80 (pt-stated)       Exercise at least 150 minutes per week.       HEMOGLOBIN A1C < 8.0       Take at least one BP reading per week at various times of the day               Assessment/Plan  1. Type 2 diabetes mellitus without complication, with long-term current use of insulin  -- A1c tightly controlled but historically, glucoses on logs appear higher than A1c suggests, though still very reasonable.  -- continue metformin XR, Jardiance   -- will contact Patient Assistance team regarding Jardiance PAP  -- BG monitoring 1x/day, alternating times.     -- Discussed diagnosis of DM, A1c goals, progression of disease, long term complications and tx options.    -- takes statin, ARB   2. Primary " hypertension  -- controlled  -- continue current meds   3. History of cardioembolic cerebrovascular accident (CVA)  -- w/R sided hemiplegia  -- avoid hypoglycemia.    4. Seizure disorder  -- avoid hypoglycemia  -- follows with neurology       FOLLOW UP  Follow up in about 4 months (around 2/29/2024).   Patient instructed to bring BG logs to each follow up   Patient encouraged to call for any BG/medication issues, concerns, or questions.    Orders Placed This Encounter   Procedures    Hemoglobin A1C

## 2024-02-29 NOTE — TELEPHONE ENCOUNTER
I have reached out to Samuel Cast to inform him of the [employer] application process for Jardiance and whats required to apply.  Samuel Cast did not answer. I left a voicemail and mailed a letter introducing him to the pharmacy patient assistance program. I will follow up in 5 business days.

## 2024-02-29 NOTE — LETTER
February 29, 2024    Samuel Cast  [address] [facility] [address] [address] [place] [id number]                Dear Mr. Cast,    My Name is Cory Ayala. I am a Pharmacy Technician reaching out on behalf of [facility]s Pharmacy Patient Assistance Team after receiving a referral from your provider inquiring about assistance with your medications. I tried to call you on 2/29/2024 but was unable to reach you. Our goal is to assist qualified patients with financial assistance for their medications to better help you achieve your health goals!    Please note that enrollment and eligibility into available support will require the following documents:    Proof of household Income( such as social security statement, 1099 form, pension statement or 3 consecutive pay stubs  Copy of all insurance cards (front and back)  Print out from your insurance or pharmacy showing how much you have spent on prescriptions this year  Signed and dated HIPAA /Patient Information Forms       Please reach out to my phone number below if you are still in need of assistance with your medications. We will attempt to reach out to you through [employer] or via phone call again in 5 business days. We look forward to hearing from you soon!    Thank you for choosing [facility] your [facility] needs    Sincerely  Cory Ayala  Pharmacy Patient Assistance  [address] [facility]  [address] [place] [address]  Phone: [phone]  Fax: [phone]  Email: [email or web address]

## 2024-03-08 NOTE — TELEPHONE ENCOUNTER
A 2nd attempt has been made to establish contact with Samuel Cast  via MY CH[employer]. The final contact attempt will be made in 5 business days

## 2024-03-11 NOTE — TELEPHONE ENCOUNTER
I have confirmed with Mr. Cast by MY CHART that he does not need prescription assistance at this time. Mr. Cast stated  he doesn't believe he will qualify

## 2024-05-01 ENCOUNTER — PATIENT MESSAGE (OUTPATIENT)
Dept: ENDOCRINOLOGY | Facility: CLINIC | Age: 69
End: 2024-05-01
Payer: MEDICARE

## 2024-05-01 DIAGNOSIS — E11.9 TYPE 2 DIABETES MELLITUS WITHOUT COMPLICATION, WITH LONG-TERM CURRENT USE OF INSULIN: ICD-10-CM

## 2024-05-01 DIAGNOSIS — Z79.4 TYPE 2 DIABETES MELLITUS WITHOUT COMPLICATION, WITH LONG-TERM CURRENT USE OF INSULIN: ICD-10-CM

## 2024-05-16 ENCOUNTER — TELEPHONE (OUTPATIENT)
Dept: FAMILY MEDICINE | Facility: CLINIC | Age: 69
End: 2024-05-16
Payer: MEDICARE

## 2024-05-16 NOTE — TELEPHONE ENCOUNTER
----- Message from Kenn Arango sent at 5/13/2024  3:25 PM CDT -----  Regarding: Discharge From Diabetes Digital Medicine  Dr. Nallely Jurado / Nallely Jurado    Unfortunately, Mr. Samuel Csat has failed to meet basic participation requirements for the DM Digital Medicine Program and will be discharged from the program. We have tried to contact him on multiple occasions without success. Unfortunately, He is not a good candidate for digital medicine monitoring.     Thank you for your support of Digital Medicine! Please contact me if you have any questions or concerns.    Sincerely,  Kenn Arango

## 2024-06-11 DIAGNOSIS — Z79.4 TYPE 2 DIABETES MELLITUS WITHOUT COMPLICATION, WITH LONG-TERM CURRENT USE OF INSULIN: ICD-10-CM

## 2024-06-11 DIAGNOSIS — E11.9 TYPE 2 DIABETES MELLITUS WITHOUT COMPLICATION, WITH LONG-TERM CURRENT USE OF INSULIN: ICD-10-CM

## 2024-06-11 NOTE — TELEPHONE ENCOUNTER
----- Message from Colby Vazquez sent at 6/11/2024 12:10 PM CDT -----  Contact: Charisse  Type:  RX Refill Request    Who Called:  Charisse/Wife  Refill or New Rx:  Refill  RX Name and Strength:  empagliflozin (JARDIANCE) 10 mg tablet  How is the patient currently taking it? (ex. 1XDay):  As Prescribed  Is this a 30 day or 90 day RX:  90  Preferred Pharmacy with phone number:    [facility] [address] [facility] [place]      Ordering Provider:  Erika Miles  Best Call Back Number:  .phone  Additional Information:  Patient moved ,  has two pills left, needs it to be refilled

## 2024-11-20 ENCOUNTER — PATIENT MESSAGE (OUTPATIENT)
Dept: ADMINISTRATIVE | Facility: HOSPITAL | Age: 69
End: 2024-11-20
Payer: COMMERCIAL

## 2024-11-21 DIAGNOSIS — E11.9 TYPE 2 DIABETES MELLITUS WITHOUT COMPLICATION, UNSPECIFIED WHETHER LONG TERM INSULIN USE: ICD-10-CM

## 2025-02-17 ENCOUNTER — PATIENT OUTREACH (OUTPATIENT)
Dept: ADMINISTRATIVE | Facility: HOSPITAL | Age: 70
End: 2025-02-17
Payer: COMMERCIAL

## 2025-02-17 NOTE — PROGRESS NOTES
Pts order for statin therapy has .     REFILL ENCOUNTER SENT TO PCP   Statin Use in Persons with Diabetes -Patients aged 40-75 years with a diagnosis of diabetes: Type 1 or Type 2.  Needs any Intensity Statin during the calendar year      Moderate-Intensity Statin Therapy  Atorvastatin 10-20 mg  Pitavastatin 1-4 mg  Simvastatin 20-40 mg  Rosuvastatin 5-10 mg  Pravastatin 40-80 mg  Lovastatin 40 mg  Fluvastatin 40 mg  Fluvastatin XL 80 mg    High-intensity Statin Therapy  Atorvastatin 40-80 mg  Rosuvastatin 20-40 mg  Simvastatin 80 mg    Exclusions: Intolerance needs a  code (myalgia, myositis, myopathy, or rhabdomyolysis) during the current measurement year AND problem list updated and reviewed IF APPLIES.   G72.0  Drug-induced myopathy or rhabdomyolysis   G72.9  Myopathy, unspecified  M60.9  Myositis, unspecified  M79.10 Myalgia, unspecified site

## 2025-02-18 RX ORDER — ATORVASTATIN CALCIUM 80 MG/1
80 TABLET, FILM COATED ORAL DAILY
Qty: 90 TABLET | Refills: 3 | Status: SHIPPED | OUTPATIENT
Start: 2025-02-18 | End: 2026-02-18

## 2025-04-30 DIAGNOSIS — E11.51 TYPE II DIABETES MELLITUS WITH PERIPHERAL CIRCULATORY DISORDER: ICD-10-CM

## 2025-05-07 DIAGNOSIS — E11.9 TYPE 2 DIABETES MELLITUS WITHOUT COMPLICATION: ICD-10-CM

## 2025-08-19 ENCOUNTER — PATIENT MESSAGE (OUTPATIENT)
Dept: ADMINISTRATIVE | Facility: HOSPITAL | Age: 70
End: 2025-08-19
Payer: COMMERCIAL

## (undated) DEVICE — SEE MEDLINE ITEM 157148

## (undated) DEVICE — GLOVE SURGICAL LATEX SZ 7

## (undated) DEVICE — KIT IVAS ELITE BLLN 11G 15MM

## (undated) DEVICE — GLOVE 7.5 PROTEXIS PI MICRO

## (undated) DEVICE — KIT AVAFLEX CANNULA W/O NDL

## (undated) DEVICE — TRAY NERVE BLOCK

## (undated) DEVICE — APPLICATOR CHLORAPREP CLR 10.5

## (undated) DEVICE — SEE L#120831

## (undated) DEVICE — SEE MEDLINE ITEM 157128

## (undated) DEVICE — NDL SPINAL SPINOCAN 22GX3.5

## (undated) DEVICE — DRAPE C ARM 42 X 120 10/BX

## (undated) DEVICE — Device

## (undated) DEVICE — SOL SOD CHLORIDE 0.9% 10ML

## (undated) DEVICE — DURAPREP SURG SCRUB 26ML

## (undated) DEVICE — GLOVE BIOGEL PI MICRO SZ 7.5

## (undated) DEVICE — CHLORAPREP W TINT 26ML APPL

## (undated) DEVICE — DRILL HAND KYPHOPLASTY 11X7.75

## (undated) DEVICE — ADHESIVE DERMABOND ADVANCED

## (undated) DEVICE — BLADE SURG CARBON STEEL SZ11

## (undated) DEVICE — DRAPE INCISE IOBAN 2 23X17IN

## (undated) DEVICE — REMOVER LOTION

## (undated) DEVICE — BLLN AVAFLEX 11G 15MM